# Patient Record
Sex: FEMALE | NOT HISPANIC OR LATINO | Employment: OTHER | URBAN - METROPOLITAN AREA
[De-identification: names, ages, dates, MRNs, and addresses within clinical notes are randomized per-mention and may not be internally consistent; named-entity substitution may affect disease eponyms.]

---

## 2018-11-08 ENCOUNTER — APPOINTMENT (EMERGENCY)
Dept: RADIOLOGY | Facility: HOSPITAL | Age: 83
DRG: 194 | End: 2018-11-08
Payer: MEDICARE

## 2018-11-08 ENCOUNTER — APPOINTMENT (EMERGENCY)
Dept: CT IMAGING | Facility: HOSPITAL | Age: 83
DRG: 194 | End: 2018-11-08
Payer: MEDICARE

## 2018-11-08 ENCOUNTER — HOSPITAL ENCOUNTER (INPATIENT)
Facility: HOSPITAL | Age: 83
LOS: 4 days | Discharge: NURSING, REHAB OR LONG-TERM CARE | DRG: 194 | End: 2018-11-13
Attending: EMERGENCY MEDICINE | Admitting: HOSPITALIST
Payer: MEDICARE

## 2018-11-08 DIAGNOSIS — E11.9 TYPE 2 DIABETES MELLITUS WITHOUT COMPLICATION, WITHOUT LONG-TERM CURRENT USE OF INSULIN (HCC): Chronic | ICD-10-CM

## 2018-11-08 DIAGNOSIS — J18.9 PNEUMONIA OF LEFT LOWER LOBE DUE TO INFECTIOUS ORGANISM: ICD-10-CM

## 2018-11-08 DIAGNOSIS — R07.9 CHEST PAIN: ICD-10-CM

## 2018-11-08 DIAGNOSIS — R50.9 FEVER, UNSPECIFIED FEVER CAUSE: ICD-10-CM
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
## 2018-11-08 DIAGNOSIS — R55 SYNCOPE: ICD-10-CM

## 2018-11-08 LAB
ALBUMIN SERPL BCP-MCNC: 3.1 G/DL (ref 3.5–5)
ALP SERPL-CCNC: 134 U/L (ref 46–116)
ALT SERPL W P-5'-P-CCNC: 18 U/L (ref 12–78)
ANION GAP SERPL CALCULATED.3IONS-SCNC: 5 MMOL/L (ref 4–13)
ANION GAP SERPL CALCULATED.3IONS-SCNC: 8 MMOL/L (ref 4–13)
APTT PPP: 32 SECONDS (ref 24–36)
AST SERPL W P-5'-P-CCNC: 48 U/L (ref 5–45)
BACTERIA UR QL AUTO: ABNORMAL /HPF
BASOPHILS # BLD AUTO: 0.05 THOUSANDS/ΜL (ref 0–0.1)
BASOPHILS NFR BLD AUTO: 0 % (ref 0–1)
BILIRUB SERPL-MCNC: 0.52 MG/DL (ref 0.2–1)
BILIRUB UR QL STRIP: NEGATIVE
BUN SERPL-MCNC: 19 MG/DL (ref 5–25)
BUN SERPL-MCNC: 20 MG/DL (ref 5–25)
CALCIUM SERPL-MCNC: 9.1 MG/DL (ref 8.3–10.1)
CALCIUM SERPL-MCNC: 9.5 MG/DL (ref 8.3–10.1)
CHLORIDE SERPL-SCNC: 100 MMOL/L (ref 100–108)
CHLORIDE SERPL-SCNC: 99 MMOL/L (ref 100–108)
CLARITY UR: CLEAR
CO2 SERPL-SCNC: 31 MMOL/L (ref 21–32)
CO2 SERPL-SCNC: 32 MMOL/L (ref 21–32)
COLOR UR: YELLOW
COLOR, POC: YELLOW
CREAT SERPL-MCNC: 1.21 MG/DL (ref 0.6–1.3)
CREAT SERPL-MCNC: 1.25 MG/DL (ref 0.6–1.3)
EOSINOPHIL # BLD AUTO: 0.08 THOUSAND/ΜL (ref 0–0.61)
EOSINOPHIL NFR BLD AUTO: 1 % (ref 0–6)
ERYTHROCYTE [DISTWIDTH] IN BLOOD BY AUTOMATED COUNT: 13 % (ref 11.6–15.1)
GFR SERPL CREATININE-BSD FRML MDRD: 37 ML/MIN/1.73SQ M
GFR SERPL CREATININE-BSD FRML MDRD: 38 ML/MIN/1.73SQ M
GLUCOSE SERPL-MCNC: 282 MG/DL (ref 65–140)
GLUCOSE SERPL-MCNC: 327 MG/DL (ref 65–140)
GLUCOSE UR STRIP-MCNC: ABNORMAL MG/DL
HCT VFR BLD AUTO: 40.5 % (ref 34.8–46.1)
HGB BLD-MCNC: 12.9 G/DL (ref 11.5–15.4)
HGB UR QL STRIP.AUTO: NEGATIVE
IMM GRANULOCYTES # BLD AUTO: 0.09 THOUSAND/UL (ref 0–0.2)
IMM GRANULOCYTES NFR BLD AUTO: 1 % (ref 0–2)
INR PPP: 1.02 (ref 0.86–1.17)
KETONES UR STRIP-MCNC: NEGATIVE MG/DL
LEUKOCYTE ESTERASE UR QL STRIP: ABNORMAL
LYMPHOCYTES # BLD AUTO: 1.35 THOUSANDS/ΜL (ref 0.6–4.47)
LYMPHOCYTES NFR BLD AUTO: 9 % (ref 14–44)
MCH RBC QN AUTO: 27.6 PG (ref 26.8–34.3)
MCHC RBC AUTO-ENTMCNC: 31.9 G/DL (ref 31.4–37.4)
MCV RBC AUTO: 87 FL (ref 82–98)
MONOCYTES # BLD AUTO: 0.92 THOUSAND/ΜL (ref 0.17–1.22)
MONOCYTES NFR BLD AUTO: 6 % (ref 4–12)
NEUTROPHILS # BLD AUTO: 12.64 THOUSANDS/ΜL (ref 1.85–7.62)
NEUTS SEG NFR BLD AUTO: 83 % (ref 43–75)
NITRITE UR QL STRIP: NEGATIVE
NON-SQ EPI CELLS URNS QL MICRO: ABNORMAL /HPF
NRBC BLD AUTO-RTO: 0 /100 WBCS
PH UR STRIP.AUTO: 7 [PH] (ref 4.5–8)
PLATELET # BLD AUTO: 263 THOUSANDS/UL (ref 149–390)
PMV BLD AUTO: 10.7 FL (ref 8.9–12.7)
POTASSIUM SERPL-SCNC: 4.5 MMOL/L (ref 3.5–5.3)
POTASSIUM SERPL-SCNC: 5.7 MMOL/L (ref 3.5–5.3)
PROT SERPL-MCNC: 7.9 G/DL (ref 6.4–8.2)
PROT UR STRIP-MCNC: ABNORMAL MG/DL
PROTHROMBIN TIME: 13.5 SECONDS (ref 11.8–14.2)
RBC # BLD AUTO: 4.68 MILLION/UL (ref 3.81–5.12)
RBC #/AREA URNS AUTO: ABNORMAL /HPF
SODIUM SERPL-SCNC: 136 MMOL/L (ref 136–145)
SODIUM SERPL-SCNC: 139 MMOL/L (ref 136–145)
SP GR UR STRIP.AUTO: 1.01 (ref 1–1.03)
TROPONIN I SERPL-MCNC: <0.02 NG/ML
UROBILINOGEN UR QL STRIP.AUTO: 0.2 E.U./DL
WBC # BLD AUTO: 15.13 THOUSAND/UL (ref 4.31–10.16)
WBC #/AREA URNS AUTO: ABNORMAL /HPF

## 2018-11-08 PROCEDURE — 80048 BASIC METABOLIC PNL TOTAL CA: CPT | Performed by: EMERGENCY MEDICINE

## 2018-11-08 PROCEDURE — 71046 X-RAY EXAM CHEST 2 VIEWS: CPT

## 2018-11-08 PROCEDURE — 93005 ELECTROCARDIOGRAM TRACING: CPT

## 2018-11-08 PROCEDURE — 85610 PROTHROMBIN TIME: CPT | Performed by: EMERGENCY MEDICINE

## 2018-11-08 PROCEDURE — 72125 CT NECK SPINE W/O DYE: CPT

## 2018-11-08 PROCEDURE — 80053 COMPREHEN METABOLIC PANEL: CPT | Performed by: EMERGENCY MEDICINE

## 2018-11-08 PROCEDURE — 74176 CT ABD & PELVIS W/O CONTRAST: CPT

## 2018-11-08 PROCEDURE — 84484 ASSAY OF TROPONIN QUANT: CPT | Performed by: EMERGENCY MEDICINE

## 2018-11-08 PROCEDURE — 99285 EMERGENCY DEPT VISIT HI MDM: CPT

## 2018-11-08 PROCEDURE — 36415 COLL VENOUS BLD VENIPUNCTURE: CPT | Performed by: EMERGENCY MEDICINE

## 2018-11-08 PROCEDURE — 99220 PR INITIAL OBSERVATION CARE/DAY 70 MINUTES: CPT | Performed by: HOSPITALIST

## 2018-11-08 PROCEDURE — 71250 CT THORAX DX C-: CPT

## 2018-11-08 PROCEDURE — 81001 URINALYSIS AUTO W/SCOPE: CPT

## 2018-11-08 PROCEDURE — 84443 ASSAY THYROID STIM HORMONE: CPT | Performed by: NURSE PRACTITIONER

## 2018-11-08 PROCEDURE — 85730 THROMBOPLASTIN TIME PARTIAL: CPT | Performed by: EMERGENCY MEDICINE

## 2018-11-08 PROCEDURE — 85025 COMPLETE CBC W/AUTO DIFF WBC: CPT | Performed by: EMERGENCY MEDICINE

## 2018-11-08 PROCEDURE — 70450 CT HEAD/BRAIN W/O DYE: CPT

## 2018-11-08 PROCEDURE — 84436 ASSAY OF TOTAL THYROXINE: CPT | Performed by: NURSE PRACTITIONER

## 2018-11-08 RX ORDER — LISINOPRIL 5 MG/1
5 TABLET ORAL DAILY
Status: DISCONTINUED | OUTPATIENT
Start: 2018-11-09 | End: 2018-11-13 | Stop reason: HOSPADM

## 2018-11-08 RX ORDER — SODIUM CHLORIDE 9 MG/ML
75 INJECTION, SOLUTION INTRAVENOUS CONTINUOUS
Status: DISCONTINUED | OUTPATIENT
Start: 2018-11-08 | End: 2018-11-09

## 2018-11-08 RX ORDER — PRAVASTATIN SODIUM 40 MG
40 TABLET ORAL
Status: DISCONTINUED | OUTPATIENT
Start: 2018-11-08 | End: 2018-11-13 | Stop reason: HOSPADM

## 2018-11-08 RX ORDER — PRAVASTATIN SODIUM 40 MG
40 TABLET ORAL DAILY
COMMUNITY
End: 2019-10-11

## 2018-11-08 RX ORDER — GLIMEPIRIDE 2 MG/1
2 TABLET ORAL
Status: DISCONTINUED | OUTPATIENT
Start: 2018-11-09 | End: 2018-11-09

## 2018-11-08 RX ORDER — GLIMEPIRIDE 2 MG/1
2 TABLET ORAL
COMMUNITY
End: 2018-11-13 | Stop reason: HOSPADM

## 2018-11-08 RX ORDER — CARVEDILOL 12.5 MG/1
12.5 TABLET ORAL 2 TIMES DAILY WITH MEALS
Status: DISCONTINUED | OUTPATIENT
Start: 2018-11-08 | End: 2018-11-13 | Stop reason: HOSPADM

## 2018-11-08 RX ORDER — CARVEDILOL 12.5 MG/1
12.5 TABLET ORAL 2 TIMES DAILY WITH MEALS
Status: ON HOLD | COMMUNITY
End: 2020-12-15 | Stop reason: SDUPTHER

## 2018-11-08 RX ORDER — HEPARIN SODIUM 5000 [USP'U]/ML
5000 INJECTION, SOLUTION INTRAVENOUS; SUBCUTANEOUS EVERY 8 HOURS SCHEDULED
Status: DISCONTINUED | OUTPATIENT
Start: 2018-11-08 | End: 2018-11-13 | Stop reason: HOSPADM

## 2018-11-08 RX ORDER — LISINOPRIL 10 MG/1
1 TABLET ORAL DAILY
COMMUNITY

## 2018-11-08 RX ORDER — TRAMADOL HYDROCHLORIDE 50 MG/1
50 TABLET ORAL EVERY 6 HOURS PRN
Status: DISCONTINUED | OUTPATIENT
Start: 2018-11-08 | End: 2018-11-13 | Stop reason: HOSPADM

## 2018-11-08 RX ADMIN — CARVEDILOL 12.5 MG: 12.5 TABLET, FILM COATED ORAL at 18:49

## 2018-11-08 RX ADMIN — TRAMADOL HYDROCHLORIDE 50 MG: 50 TABLET, FILM COATED ORAL at 21:14

## 2018-11-08 RX ADMIN — SODIUM CHLORIDE 75 ML/HR: 0.9 INJECTION, SOLUTION INTRAVENOUS at 18:50

## 2018-11-08 RX ADMIN — PRAVASTATIN SODIUM 40 MG: 40 TABLET ORAL at 18:49

## 2018-11-08 RX ADMIN — HEPARIN SODIUM 5000 UNITS: 5000 INJECTION INTRAVENOUS; SUBCUTANEOUS at 21:14

## 2018-11-08 NOTE — ED PROVIDER NOTES
History  Chief Complaint   Patient presents with    Fall     Patient reports this morning she was going to the bathroom, does not recall fall but reports the next memory she has is waking up laying over the side of the bath tub  Unclear from patient's story if she experienced a syncopal episode  Patient reporting chest pain which started after the fall  History provided by:  Patient  History limited by:  Age   used: No    Fall   Mechanism of injury: fall    Injury location:  Torso  Torso injury location: Central chest   Incident location:  Home  Arrived directly from scene: yes    Fall:     Fall occurred: In the bathroom    Height of fall:  Same level    Impact surface:  Unable to specify    Point of impact:  Unable to specify    Entrapped after fall: no    Protective equipment: none    Suspicion of alcohol use: no    Suspicion of drug use: no    Tetanus status:  Unknown  Prior to arrival data:     Bystander interventions:  None    Patient ambulatory at scene: yes      Blood loss:  None    Responsiveness at scene:  Alert    Orientation at scene:  Person, place, situation and time    Loss of consciousness: yes      LOC duration: unable to specify      Amnesic to event: no      Airway interventions:  None    Breathing interventions:  None    IV access status:  None    IO access:  None    Fluids administered:  None    Cardiac interventions:  None    Medications administered:  None    Immobilization:  None    Airway condition since incident:  Stable    Breathing condition since incident:  Stable    Circulation condition since incident:  Stable    Mental status condition since incident:  Stable    Disability condition since incident:  Stable  Associated symptoms: chest pain    Associated symptoms: no abdominal pain, no back pain, no headaches, no loss of consciousness, no nausea, no neck pain and no vomiting    Chest pain:     Quality: aching      Severity:  Moderate    Onset quality: Gradual    Duration:  1 hour    Timing:  Intermittent    Progression:  Waxing and waning    Chronicity:  New  Risk factors: no anticoagulation therapy        Prior to Admission Medications   Prescriptions Last Dose Informant Patient Reported? Taking?   carvedilol (COREG) 12 5 mg tablet   Yes Yes   Sig: Take 12 5 mg by mouth   glimepiride (AMARYL) 2 mg tablet   Yes Yes   Sig: Take 2 mg by mouth every morning before breakfast   lisinopril (ZESTRIL) 5 mg tablet   Yes Yes   Sig: Take 5 mg by mouth   pravastatin (PRAVACHOL) 40 mg tablet   Yes Yes   Sig: Take 40 mg by mouth daily      Facility-Administered Medications: None       Past Medical History:   Diagnosis Date    Diabetes mellitus ([facility] [address] )     Hypertension     Migraine        History reviewed  No pertinent surgical history  History reviewed  No pertinent family history  I have reviewed and agree with the history as documented  Social History   Substance Use Topics    Smoking status: Never Smoker    Smokeless tobacco: Not on file    Alcohol use No        Review of Systems   Constitutional: Negative for chills and fever  HENT: Negative for facial swelling, sore throat and trouble swallowing  Eyes: Negative for pain and visual disturbance  Respiratory: Negative for cough and shortness of breath  Cardiovascular: Positive for chest pain  Negative for leg swelling  Gastrointestinal: Negative for abdominal pain, blood in stool, diarrhea, nausea and vomiting  Genitourinary: Negative for dysuria and flank pain  Musculoskeletal: Negative for back pain, neck pain and neck stiffness  Skin: Negative for pallor and rash  Allergic/Immunologic: Negative for environmental allergies and immunocompromised state  Neurological: Negative for dizziness, loss of consciousness and headaches  Hematological: Negative for adenopathy  Does not bruise/bleed easily  Psychiatric/Behavioral: Negative for agitation and behavioral problems     All other systems reviewed and are negative  Physical Exam  Physical Exam   Constitutional: She is oriented to person, place, and time  She appears well-developed and well-nourished  No distress  HENT:   Head: Normocephalic and atraumatic  Eyes: EOM are normal    Neck: Normal range of motion  Neck supple  Cardiovascular: Normal rate, regular rhythm, normal heart sounds and intact distal pulses  Pulmonary/Chest: Effort normal and breath sounds normal    Abdominal: Soft  Bowel sounds are normal  There is no tenderness  There is no rebound and no guarding  Musculoskeletal: Normal range of motion  Neurological: She is alert and oriented to person, place, and time  Skin: Skin is warm and dry  Psychiatric: She has a normal mood and affect  Nursing note and vitals reviewed        Vital Signs  ED Triage Vitals   Temperature Pulse Respirations Blood Pressure SpO2   11/08/18 1317 11/08/18 1317 11/08/18 1317 11/08/18 1317 11/08/18 1317   98 3 °F (36 8 °C) 77 18 138/93 92 %      Temp Source Heart Rate Source Patient Position - Orthostatic VS BP Location FiO2 (%)   11/08/18 1317 11/08/18 1317 11/08/18 1317 11/08/18 1602 --   Oral Monitor Lying Left arm       Pain Score       11/08/18 1317       No Pain           Vitals:    11/08/18 1317 11/08/18 1602 11/08/18 1658 11/08/18 1744   BP: 138/93 (!) 211/94 (!) 183/85 (!) 176/84   Pulse: 77 72 73 76   Patient Position - Orthostatic VS: Lying Lying  Lying       Visual Acuity      ED Medications  Medications   carvedilol (COREG) tablet 12 5 mg (12 5 mg Oral Given 11/8/18 1849)   lisinopril (ZESTRIL) tablet 5 mg (not administered)   glimepiride (AMARYL) tablet 2 mg (not administered)   pravastatin (PRAVACHOL) tablet 40 mg (40 mg Oral Given 11/8/18 1849)   heparin (porcine) subcutaneous injection 5,000 Units (5,000 Units Subcutaneous Given 11/8/18 2114)   sodium chloride 0 9 % infusion (75 mL/hr Intravenous New Bag 11/8/18 1850)   traMADol (ULTRAM) tablet 50 mg (50 mg Oral Given 11/8/18 2114)       Diagnostic Studies  Results Reviewed     Procedure Component Value Units Date/Time    Urine Microscopic [[id number]]  (Abnormal) Collected:  11/08/18 1710    Lab Status:  Final result Specimen:  Urine from Urine, Clean Catch Updated:  11/08/18 1630     RBC, UA None Seen /hpf      WBC, UA 4-10 (A) /hpf      Epithelial Cells Occasional /hpf      Bacteria, UA Occasional /hpf     POCT urinalysis dipstick [[id number]]  (Abnormal) Resulted:  11/08/18 1559    Lab Status:  Final result Specimen:  Urine Updated:  11/08/18 1559     Color, UA yellow    ED Urine Macroscopic [[id number]]  (Abnormal) Collected:  11/08/18 1710    Lab Status:  Final result Specimen:  Urine Updated:  11/08/18 1557     Color, UA Yellow     Clarity, UA Clear     pH, UA 7 0     Leukocytes, UA Small (A)     Nitrite, UA Negative     Protein,  (2+) (A) mg/dl      Glucose, UA >=1000 (1%) (A) mg/dl      Ketones, UA Negative mg/dl      Urobilinogen, UA 0 2 E U /dl      Bilirubin, UA Negative     Blood, UA Negative     Specific Gravity, UA 1 015    Narrative:       CLINITEK RESULT    Basic metabolic panel [[id number]]  (Abnormal) Collected:  11/08/18 1527    Lab Status:  Final result Specimen:  Blood from Hand, Right Updated:  11/08/18 1548     Sodium 139 mmol/L      Potassium 4 5 mmol/L      Chloride 100 mmol/L      CO2 31 mmol/L      ANION GAP 8 mmol/L      BUN 20 mg/dL      Creatinine 1 25 mg/dL      Glucose 282 (H) mg/dL      Calcium 9 5 mg/dL      eGFR 37 ml/min/1 73sq m     Narrative:         National Kidney Disease Education Program recommendations are as follows:  GFR calculation is accurate only with a steady state creatinine  Chronic Kidney disease less than 60 ml/min/1 73 sq  meters  Kidney failure less than 15 ml/min/1 73 sq  meters      Protime-INR [[id number]]  (Normal) Collected:  11/08/18 1428    Lab Status:  Final result Specimen:  Blood from Arm, Left Updated:  11/08/18 1443     Protime 13 5 seconds      INR 1 02    APTT [[id number]]  (Normal) Collected:  11/08/18 1428    Lab Status:  Final result Specimen:  Blood from Arm, Left Updated:  11/08/18 1443     PTT 32 seconds     Comprehensive metabolic panel [[id number]]  (Abnormal) Collected:  11/08/18 1329    Lab Status:  Final result Specimen:  Blood from Arm, Left Updated:  11/08/18 1417     Sodium 136 mmol/L      Potassium 5 7 (H) mmol/L      Chloride 99 (L) mmol/L      CO2 32 mmol/L      ANION GAP 5 mmol/L      BUN 19 mg/dL      Creatinine 1 21 mg/dL      Glucose 327 (H) mg/dL      Calcium 9 1 mg/dL      AST 48 (H) U/L      ALT 18 U/L      Alkaline Phosphatase 134 (H) U/L      Total Protein 7 9 g/dL      Albumin 3 1 (L) g/dL      Total Bilirubin 0 52 mg/dL      eGFR 38 ml/min/1 73sq m     Narrative:         National Kidney Disease Education Program recommendations are as follows:  GFR calculation is accurate only with a steady state creatinine  Chronic Kidney disease less than 60 ml/min/1 73 sq  meters  Kidney failure less than 15 ml/min/1 73 sq  meters      Troponin I [[id number]]  (Normal) Collected:  11/08/18 1329    Lab Status:  Final result Specimen:  Blood from Arm, Left Updated:  11/08/18 1400     Troponin I <0 02 ng/mL     CBC and differential [[id number]]  (Abnormal) Collected:  11/08/18 1329    Lab Status:  Final result Specimen:  Blood from Arm, Left Updated:  11/08/18 1337     WBC 15 13 (H) Thousand/uL      RBC 4 68 Million/uL      Hemoglobin 12 9 g/dL      Hematocrit 40 5 %      MCV 87 fL      MCH 27 6 pg      MCHC 31 9 g/dL      RDW 13 0 %      MPV 10 7 fL      Platelets 290 Thousands/uL      nRBC 0 /100 WBCs      Neutrophils Relative 83 (H) %      Immat GRANS % 1 %      Lymphocytes Relative 9 (L) %      Monocytes Relative 6 %      Eosinophils Relative 1 %      Basophils Relative 0 %      Neutrophils Absolute 12 64 (H) Thousands/µL      Immature Grans Absolute 0 09 Thousand/uL      Lymphocytes Absolute 1 35 Thousands/µL      Monocytes Absolute 0 92 Thousand/µL Eosinophils Absolute 0 08 Thousand/µL      Basophils Absolute 0 05 Thousands/µL                  CT chest abdomen pelvis wo contrast   Final Result by Charlotte Aguilar DO (11/08 1514)   Addendum 1 of 1 by Charlotte Aguilar DO (11/08 1529)   ADDENDUM:      Typographical error within the impression  The 1st part of the impression    should read as follows  No acute TRAUMATIC injury    Final      No acute rheumatic injury of the chest, abdomen or pelvis status post fall  Gallbladder is distended with faint hyperdensity layering posteriorly suggesting sludge or tiny calcified stones  Consider right upper quadrant ultrasound and correlation with liver function testing  Diffuse osteopenia and degenerative endplate hypertrophic changes of the thoracic and lumbar spine  No acute osseous abnormality  Diffuse fecal stasis  Colonic diverticulosis  Workstation performed: [id number]         CT recon only thoracolumbar   Final Result by Ina Calles MD (11/08 1522)      Diffuse idiopathic skeletal hyperostosis of the thoracic and lumbar spine without evidence of acute trauma               Workstation performed: [id number]         XR chest 2 views   Final Result by Fermín Slade MD (11/08 1516)      No acute cardiopulmonary disease  Workstation performed: [id number]         CT cervical spine without contrast   Final Result by Charlotte Aguilar DO (11/08 1508)      Osteopenia and degenerative change of the cervical spine with no acute fracture identified  Thyroid gland is enlarged and heterogeneous  This could be further evaluated with thyroid ultrasound follow-up  Considerations related to the patient's age and/or comorbidities may be used to alter these recommendations                       Workstation performed: [id number]         CT head without contrast   Final Result by Charlotte Aguilar DO (11/08 1502)      Moderate chronic microangiopathic change with no hemorrhage or acute ischemia identified  1 cm left anterior parafalcine mass appears extra-axial on series 2 image 29 consistent with meningioma  No significant mass effect upon or edema within the brain parenchyma  Workstation performed: [id number]                    Procedures  ECG 12 Lead Documentation  Date/Time: 11/8/2018 3:23 PM  Performed by: Paolo Xavier  Authorized by: Paolo Xavier     Indications / Diagnosis:  Chest pain  ECG reviewed by me, the ED Provider: yes    Patient location:  ED  Interpretation:     Interpretation: normal    Rate:     ECG rate:  72    ECG rate assessment: normal    Rhythm:     Rhythm: sinus rhythm    Ectopy:     Ectopy: none    QRS:     QRS axis:  Normal  Conduction:     Conduction: normal    ST segments:     ST segments:  Normal  T waves:     T waves: normal             Phone Contacts  ED Phone Contact    ED Course  ED Course as of Nov 08 2123   Thu Nov 08, 2018   1548 Labs, EKG, chest x-ray, CT head, CT C-spine, chest abdomen pelvis reviewed, no acute abnormality  Results informed to the patient and daughter  Daughter concerned about dizzy, recurrent falls  We will admit for chest pain/syncope; case discussed with Dr Emanuel Walter, advised observation  MDM  Number of Diagnoses or Management Options  Chest pain: new and requires workup  Fall, initial encounter: new and requires workup  Syncope: new and requires workup  Diagnosis management comments: Is a 80-year-old female, comes with history of fall, syncope bathroom today, recovered on scene, neighbor checked on her, she had chest pain, brought through the ambulance for evaluation  On exam no acute distress:  Vital signs stable, alert and oriented, lungs clear, cardiovascular normal heart sounds, abdomen soft nontender, neuro exam no focal deficit    Differential diagnosis:  Fall, syncope, chest pain, rule out intracellular hemorrhage, electrolyte imbalance, ACS, traumatic injury to the chest abdomen pelvis  Will check labs, EKG, CT head, C-spine, CT chest, abdomen pelvis, T and L-spine  Amount and/or Complexity of Data Reviewed  Clinical lab tests: reviewed and ordered  Tests in the radiology section of CPT®: ordered and reviewed  Tests in the medicine section of CPT®: ordered  Discuss the patient with other providers: yes  Independent visualization of images, tracings, or specimens: yes      CritCare Time    Disposition  Final diagnoses:   Fall, initial encounter   Syncope   Chest pain     Time reflects when diagnosis was documented in both MDM as applicable and the Disposition within this note     Time User Action Codes Description Comment    11/8/2018  3:51 PM Nena Modest Add Faheem Sports  XXXA] Fall, initial encounter     11/8/2018  3:51 PM Nena Modest Add [R55] Syncope     11/8/2018  3:51 PM Nena Modest Add [R07 9] Chest pain       ED Disposition     ED Disposition Condition Comment    Admit  Case was discussed with Dr Payton Allen and the patient's admission status was agreed to be Admission Status: observation status to the service of Dr Payton Allen         Follow-up Information    None         Current Discharge Medication List      CONTINUE these medications which have NOT CHANGED    Details   carvedilol (COREG) 12 5 mg tablet Take 12 5 mg by mouth      glimepiride (AMARYL) 2 mg tablet Take 2 mg by mouth every morning before breakfast      lisinopril (ZESTRIL) 5 mg tablet Take 5 mg by mouth      pravastatin (PRAVACHOL) 40 mg tablet Take 40 mg by mouth daily           No discharge procedures on file      ED Provider  Electronically Signed by           Pietro Solorzano MD  11/08/18 7424

## 2018-11-08 NOTE — H&P
H&P- Joe Rollins 5/2/1923, 80 y o  female MRN: [id number]    Unit/Bed#: [facility]ENN[facility] Encounter: [id number]    Primary Care Provider: Juju Bella DO   Date and time admitted to hospital: 11/8/2018 12:52 PM        * Fall   Assessment & Plan    Multiple falls at home  Some chest wall trauma  Daughter wants rehab             Chief Complaint:   Fall in bathroom  Fell on side of bathtub  History of Present Illness:    Joe Rollins is a 80 y o  female who presents with fall  She was using the toilet and became very weak  She fell off the toilet and hit her chest on the side of the bathtub  No actual loss of consiousness  She then got up and got herself over to use the phone and called her neighbor for help  No actual LOC  She has fallen multiple times at home         Review of Systems:    Review of Systems   Constitutional: Positive for fatigue  HENT: Negative  Eyes: Negative  Respiratory: Negative  Cardiovascular: Positive for chest pain  Gastrointestinal: Negative  Endocrine: Negative  Genitourinary: Negative  Musculoskeletal: Negative  Neurological: Positive for weakness  All other systems reviewed and are negative  Past Medical and Surgical History:     Past Medical History:   Diagnosis Date    Diabetes mellitus ([facility] [address] )     Hypertension     Migraine        History reviewed  No pertinent surgical history  Home Medications:    Prior to Admission medications    Medication Sig Start Date End Date Taking?  Authorizing Provider   carvedilol (COREG) 12 5 mg tablet Take 12 5 mg by mouth   Yes Historical Provider, MD   glimepiride (AMARYL) 2 mg tablet Take 2 mg by mouth every morning before breakfast   Yes Historical Provider, MD   lisinopril (ZESTRIL) 5 mg tablet Take 5 mg by mouth   Yes Historical Provider, MD   pravastatin (PRAVACHOL) 40 mg tablet Take 40 mg by mouth daily   Yes Historical Provider, MD     I have reviewed home medications with patient personally  Allergies: No Known Allergies      Social History:    Substance Use History:   History   Alcohol Use No     History   Smoking Status    Never Smoker   Smokeless Tobacco    Not on file     History   Drug Use No         Family History:    non-contributory      Physical Exam:     Vitals:   Blood Pressure: (!) 183/85 (11/08/18 1658)  Pulse: 73 (11/08/18 1658)  Temperature: 98 3 °F (36 8 °C) (11/08/18 1317)  Temp Source: Oral (11/08/18 1317)  Respirations: 18 (11/08/18 1658)  Weight - Scale: 67 2 kg (148 lb 2 4 oz) (11/08/18 1317)  SpO2: 98 % (11/08/18 1658)    Physical Exam   HENT:   Head: Normocephalic and atraumatic  Eyes: Pupils are equal, round, and reactive to light  EOM are normal    Cardiovascular: Normal rate and regular rhythm  Exam reveals no gallop and no friction rub  No murmur heard  Pulmonary/Chest: Effort normal and breath sounds normal  She has no wheezes  She has no rales  Abdominal: Soft  There is no tenderness  Musculoskeletal: She exhibits no edema  Chest pain to palpation of anterior chest well  No eccymosis  No evidence of rib fractures,  Nursing note and vitals reviewed       Additional Data:     Lab Results: I have personally reviewed pertinent reports  Results from last 7 days  Lab Units 11/08/18  1329   WBC Thousand/uL 15 13*   HEMOGLOBIN g/dL 12 9   HEMATOCRIT % 40 5   PLATELETS Thousands/uL 263   NEUTROS PCT % 83*   LYMPHS PCT % 9*   MONOS PCT % 6   EOS PCT % 1       Results from last 7 days  Lab Units 11/08/18  1527 11/08/18  1329   POTASSIUM mmol/L 4 5 5 7*   CHLORIDE mmol/L 100 99*   CO2 mmol/L 31 32   BUN mg/dL 20 19   CREATININE mg/dL 1 25 1 21   CALCIUM mg/dL 9 5 9 1   ALK PHOS U/L  --  134*   ALT U/L  --  18   AST U/L  --  48*       Results from last 7 days  Lab Units 11/08/18  1428   INR  1 02                 Imaging: I have personally reviewed pertinent reports        CT chest abdomen pelvis wo contrast   Final Result by Twan Cleaning,  (11/08 1514)   Addendum 1 of 1 by Kashmir Samuels DO (11/08 1529)   ADDENDUM:      Typographical error within the impression  The 1st part of the impression    should read as follows  No acute TRAUMATIC injury    Final      No acute rheumatic injury of the chest, abdomen or pelvis status post fall  Gallbladder is distended with faint hyperdensity layering posteriorly suggesting sludge or tiny calcified stones  Consider right upper quadrant ultrasound and correlation with liver function testing  Diffuse osteopenia and degenerative endplate hypertrophic changes of the thoracic and lumbar spine  No acute osseous abnormality  Diffuse fecal stasis  Colonic diverticulosis  Workstation performed: [id number]         CT recon only thoracolumbar   Final Result by Tavia Hatch MD (11/08 1522)      Diffuse idiopathic skeletal hyperostosis of the thoracic and lumbar spine without evidence of acute trauma               Workstation performed: [id number]         XR chest 2 views   Final Result by Dominique Rondon MD (11/08 1516)      No acute cardiopulmonary disease  Workstation performed: [id number]         CT cervical spine without contrast   Final Result by Kashmir Samuels DO (11/08 1508)      Osteopenia and degenerative change of the cervical spine with no acute fracture identified  Thyroid gland is enlarged and heterogeneous  This could be further evaluated with thyroid ultrasound follow-up  Considerations related to the patient's age and/or comorbidities may be used to alter these recommendations  Workstation performed: [id number]         CT head without contrast   Final Result by Kashmir Samuels DO (11/08 1502)      Moderate chronic microangiopathic change with no hemorrhage or acute ischemia identified  1 cm left anterior parafalcine mass appears extra-axial on series 2 image 29 consistent with meningioma    No significant mass effect upon or edema within the brain parenchyma  Workstation performed: [id number]                 VTE Prophylaxis: Heparin        Anticipated Length of Stay:  Patient will be admitted on an Observation basis with an anticipated length of stay of  Less than 2 midnights  Justification for Hospital Stay: evaluation for falls      Total Time for Visit, including Counseling / Coordination of Care: 45 minutes  Greater than 50% of this total time spent on direct patient counseling and coordination of care  ** Please Note: This note has been constructed using a voice recognition system   **

## 2018-11-09 ENCOUNTER — APPOINTMENT (INPATIENT)
Dept: NON INVASIVE DIAGNOSTICS | Facility: HOSPITAL | Age: 83
DRG: 194 | End: 2018-11-09
Payer: MEDICARE

## 2018-11-09 PROBLEM — E11.9 TYPE 2 DIABETES MELLITUS WITHOUT COMPLICATION, WITHOUT LONG-TERM CURRENT USE OF INSULIN (HCC): Chronic | Status: ACTIVE | Noted: 2018-11-09

## 2018-11-09 PROBLEM — R55 SYNCOPE AND COLLAPSE: Status: ACTIVE | Noted: 2018-11-09

## 2018-11-09 PROBLEM — I10 ESSENTIAL HYPERTENSION: Chronic | Status: ACTIVE | Noted: 2018-11-09

## 2018-11-09 LAB
ANION GAP SERPL CALCULATED.3IONS-SCNC: 6 MMOL/L (ref 4–13)
BASOPHILS # BLD AUTO: 0.04 THOUSANDS/ΜL (ref 0–0.1)
BASOPHILS NFR BLD AUTO: 1 % (ref 0–1)
BUN SERPL-MCNC: 17 MG/DL (ref 5–25)
CALCIUM SERPL-MCNC: 8.8 MG/DL (ref 8.3–10.1)
CHLORIDE SERPL-SCNC: 103 MMOL/L (ref 100–108)
CO2 SERPL-SCNC: 30 MMOL/L (ref 21–32)
CREAT SERPL-MCNC: 1.21 MG/DL (ref 0.6–1.3)
EOSINOPHIL # BLD AUTO: 0.11 THOUSAND/ΜL (ref 0–0.61)
EOSINOPHIL NFR BLD AUTO: 1 % (ref 0–6)
ERYTHROCYTE [DISTWIDTH] IN BLOOD BY AUTOMATED COUNT: 13.1 % (ref 11.6–15.1)
GFR SERPL CREATININE-BSD FRML MDRD: 38 ML/MIN/1.73SQ M
GLUCOSE SERPL-MCNC: 178 MG/DL (ref 65–140)
GLUCOSE SERPL-MCNC: 197 MG/DL (ref 65–140)
GLUCOSE SERPL-MCNC: 249 MG/DL (ref 65–140)
HCT VFR BLD AUTO: 40.2 % (ref 34.8–46.1)
HGB BLD-MCNC: 12.6 G/DL (ref 11.5–15.4)
IMM GRANULOCYTES # BLD AUTO: 0.01 THOUSAND/UL (ref 0–0.2)
IMM GRANULOCYTES NFR BLD AUTO: 0 % (ref 0–2)
LYMPHOCYTES # BLD AUTO: 1.97 THOUSANDS/ΜL (ref 0.6–4.47)
LYMPHOCYTES NFR BLD AUTO: 23 % (ref 14–44)
MAGNESIUM SERPL-MCNC: 2.1 MG/DL (ref 1.6–2.6)
MCH RBC QN AUTO: 27.1 PG (ref 26.8–34.3)
MCHC RBC AUTO-ENTMCNC: 31.3 G/DL (ref 31.4–37.4)
MCV RBC AUTO: 87 FL (ref 82–98)
MONOCYTES # BLD AUTO: 0.96 THOUSAND/ΜL (ref 0.17–1.22)
MONOCYTES NFR BLD AUTO: 11 % (ref 4–12)
NEUTROPHILS # BLD AUTO: 5.51 THOUSANDS/ΜL (ref 1.85–7.62)
NEUTS SEG NFR BLD AUTO: 64 % (ref 43–75)
NRBC BLD AUTO-RTO: 0 /100 WBCS
PLATELET # BLD AUTO: 319 THOUSANDS/UL (ref 149–390)
PMV BLD AUTO: 9.2 FL (ref 8.9–12.7)
POTASSIUM SERPL-SCNC: 3.9 MMOL/L (ref 3.5–5.3)
RBC # BLD AUTO: 4.65 MILLION/UL (ref 3.81–5.12)
SODIUM SERPL-SCNC: 139 MMOL/L (ref 136–145)
T4 SERPL-MCNC: 8.2 UG/DL (ref 4.7–13.3)
TSH SERPL DL<=0.05 MIU/L-ACNC: 0.74 UIU/ML (ref 0.36–3.74)
WBC # BLD AUTO: 8.6 THOUSAND/UL (ref 4.31–10.16)

## 2018-11-09 PROCEDURE — 80048 BASIC METABOLIC PNL TOTAL CA: CPT | Performed by: HOSPITALIST

## 2018-11-09 PROCEDURE — 97166 OT EVAL MOD COMPLEX 45 MIN: CPT

## 2018-11-09 PROCEDURE — G8979 MOBILITY GOAL STATUS: HCPCS

## 2018-11-09 PROCEDURE — G8987 SELF CARE CURRENT STATUS: HCPCS

## 2018-11-09 PROCEDURE — 82948 REAGENT STRIP/BLOOD GLUCOSE: CPT

## 2018-11-09 PROCEDURE — 93306 TTE W/DOPPLER COMPLETE: CPT

## 2018-11-09 PROCEDURE — G8978 MOBILITY CURRENT STATUS: HCPCS

## 2018-11-09 PROCEDURE — 99232 SBSQ HOSP IP/OBS MODERATE 35: CPT | Performed by: HOSPITALIST

## 2018-11-09 PROCEDURE — 93306 TTE W/DOPPLER COMPLETE: CPT | Performed by: INTERNAL MEDICINE

## 2018-11-09 PROCEDURE — 93880 EXTRACRANIAL BILAT STUDY: CPT

## 2018-11-09 PROCEDURE — G8988 SELF CARE GOAL STATUS: HCPCS

## 2018-11-09 PROCEDURE — 85025 COMPLETE CBC W/AUTO DIFF WBC: CPT | Performed by: HOSPITALIST

## 2018-11-09 PROCEDURE — 83735 ASSAY OF MAGNESIUM: CPT | Performed by: HOSPITALIST

## 2018-11-09 PROCEDURE — 97163 PT EVAL HIGH COMPLEX 45 MIN: CPT

## 2018-11-09 RX ADMIN — HEPARIN SODIUM 5000 UNITS: 5000 INJECTION INTRAVENOUS; SUBCUTANEOUS at 14:18

## 2018-11-09 RX ADMIN — CARVEDILOL 12.5 MG: 12.5 TABLET, FILM COATED ORAL at 08:39

## 2018-11-09 RX ADMIN — HEPARIN SODIUM 5000 UNITS: 5000 INJECTION INTRAVENOUS; SUBCUTANEOUS at 21:13

## 2018-11-09 RX ADMIN — GLIMEPIRIDE 2 MG: 2 TABLET ORAL at 08:39

## 2018-11-09 RX ADMIN — CARVEDILOL 12.5 MG: 12.5 TABLET, FILM COATED ORAL at 18:06

## 2018-11-09 RX ADMIN — PRAVASTATIN SODIUM 40 MG: 40 TABLET ORAL at 18:06

## 2018-11-09 RX ADMIN — TRAMADOL HYDROCHLORIDE 50 MG: 50 TABLET, FILM COATED ORAL at 21:12

## 2018-11-09 RX ADMIN — SODIUM CHLORIDE 75 ML/HR: 0.9 INJECTION, SOLUTION INTRAVENOUS at 08:17

## 2018-11-09 RX ADMIN — LISINOPRIL 5 MG: 5 TABLET ORAL at 08:39

## 2018-11-09 RX ADMIN — INSULIN LISPRO 1 UNITS: 100 INJECTION, SOLUTION INTRAVENOUS; SUBCUTANEOUS at 18:07

## 2018-11-09 RX ADMIN — INSULIN LISPRO 2 UNITS: 100 INJECTION, SOLUTION INTRAVENOUS; SUBCUTANEOUS at 21:13

## 2018-11-09 RX ADMIN — HEPARIN SODIUM 5000 UNITS: 5000 INJECTION INTRAVENOUS; SUBCUTANEOUS at 05:21

## 2018-11-09 NOTE — ASSESSMENT & PLAN NOTE
· Patient reports that she was on the toilet and felt herself falling forward  Her next recollection was leaning over the bathtub  She had LOC  She did not have preceding symptoms of dizziness or chest pain  · She states she has had dizziness accompanied by nausea and right ear discomfort frequently but not syncope  · This occurred first thing in the morning before eating, drinking or taking any medication    · Brain CT was negative for acute abnormality  · Will proceed with ECHO and carotid ultrasound, neuro checks, orthostatic vitals

## 2018-11-09 NOTE — SOCIAL WORK
CM met with patient and daughter, Dallin Donohue at bedside to discuss d/c plan and do initial assessment  Consult received for rehab placement, introduced self and explained role  Patient in Observation Status, Notice given, signed and sent to Medical Records  Patient admitted for falls at home, PT/OT recommend STR at d/c  Explained difference between Acute and Sub-acute rehabs, daughter would like a rehab near her in [place]  Patient and daughter agreeable to [facility], [facility] and [address], all referrals sent  SNF arline provided to daughter for [facility]  Patient lives alone in a one level apartment in [place] independent with adls, ambulates with a cane while in community but uses furniture in apartment  She does not drive anymore  Patient's daughter provides transportation and shopping, sometimes her friend/neighbor helps with driving  Hx of STR at [facility] in past and home care but could not remember name of agency  Rx coverage with [facility] and uses [facility] in [place]  PCP is Jamee Hogue  POA is daughter, Dallin Donohue, Living Will done and at home  CM will f/u with rehab bed availability       Joaquín Escalante RN   11/09/18  12:50 PM

## 2018-11-09 NOTE — PLAN OF CARE
CARDIOVASCULAR - ADULT     Maintains optimal cardiac output and hemodynamic stability Progressing     Absence of cardiac dysrhythmias or at baseline rhythm Progressing        DISCHARGE PLANNING     Discharge to home or other facility with appropriate resources Progressing        DISCHARGE PLANNING - CARE MANAGEMENT     Discharge to post-acute care or home with appropriate resources Progressing        HEMATOLOGIC - ADULT     Maintains hematologic stability Progressing        INFECTION - ADULT     Absence or prevention of progression during hospitalization Progressing        Knowledge Deficit     Patient/family/caregiver demonstrates understanding of disease process, treatment plan, medications, and discharge instructions Progressing        METABOLIC, FLUID AND ELECTROLYTES - ADULT     Electrolytes maintained within normal limits Progressing        MUSCULOSKELETAL - ADULT     Maintain or return mobility to safest level of function Progressing        PAIN - ADULT     Verbalizes/displays adequate comfort level or baseline comfort level Progressing        Potential for Falls     Patient will remain free of falls Progressing        Prexisting or High Potential for Compromised Skin Integrity     Skin integrity is maintained or improved Progressing        RESPIRATORY - ADULT     Achieves optimal ventilation and oxygenation Progressing        SAFETY ADULT     Patient will remain free of falls Progressing

## 2018-11-09 NOTE — PROGRESS NOTES
[facility]  Progress Note - Zaida Mayo 5/2/1923, 80 y o  female MRN: [id number]    Unit/Bed#: E4 -01 Encounter: [id number]    Primary Care Provider: Bakari Ross DO   Date and time admitted to hospital: 11/8/2018 12:52 PM        * Syncope and collapse   Assessment & Plan    · Patient reports that she was on the toilet and felt herself falling forward  Her next recollection was leaning over the bathtub  She had LOC  She did not have preceding symptoms of dizziness or chest pain  · She states she has had dizziness accompanied by nausea and right ear discomfort frequently but not syncope  · This occurred first thing in the morning before eating, drinking or taking any medication  · Brain CT was negative for acute abnormality  · Will proceed with ECHO and carotid ultrasound, neuro checks, orthostatic vitals     Fall   Assessment & Plan    · Multiple falls at home due to dizziness  · Denies current pain  · Daughter wants rehab, patient would like to go home  · PT / OT consults pending     Essential hypertension   Assessment & Plan    · On Coreg and Lisinopril per home regimen  · Orthostatic VS ordered     Type 2 diabetes mellitus without complication, without long-term current use of insulin ([facility] )   Assessment & Plan    No results found for: HGBA1C    No results for input(s): POCGLU in the last 72 hours  Blood Sugar Average: Last 72 hrs:    ·  On home regimen of glimepiride 2 mg daily  · A1c pending  · Would consider changing glimepiride on discharge as patient reports she has not been eating much and has lost 40# in the last two years  · FSBS ac / hs with SSI coverage         VTE Pharmacologic Prophylaxis:   Pharmacologic: Heparin  Mechanical VTE Prophylaxis in Place: No    Patient Centered Rounds: I have performed bedside rounds with nursing staff today      Discussions with Specialists or Other Care Team Provider: Attending    Education and Discussions with Family / Patient: plan of care with patient    Time Spent for Care: 30 minutes  More than 50% of total time spent on counseling and coordination of care as described above  Current Length of Stay: 0 day(s)    Current Patient Status: Observation   Certification Statement: The patient will continue to require additional inpatient hospital stay due to evaluation for syncope    Discharge Plan: Pending diagnostic work-up    Code Status: Level 1 - Full Code      Subjective:   Patient feels improved today  She denies current pain or dizziness  She states most mornings, she has dizziness and nausea and is unable to eat  She has had weight loss because of this  She did not have dizziness yesterday prior to her syncope  Objective:     Vitals:   Temp (24hrs), Av 7 °F (37 1 °C), Min:97 8 °F (36 6 °C), Max:99 6 °F (37 6 °C)    Temp:  [97 8 °F (36 6 °C)-99 6 °F (37 6 °C)] 98 7 °F (37 1 °C)  HR:  [72-86] 86  Resp:  [16-20] 16  BP: (141-211)/(74-94) 168/80  SpO2:  [92 %-98 %] 96 %  Body mass index is 26 24 kg/m²  Input and Output Summary (last 24 hours): Intake/Output Summary (Last 24 hours) at 18 1323  Last data filed at 18 1202   Gross per 24 hour   Intake              300 ml   Output                0 ml   Net              300 ml       Physical Exam:     Physical Exam   Constitutional: She is oriented to person, place, and time  She appears well-developed and well-nourished  No distress  HENT:   Head: Normocephalic and atraumatic  Eyes: Conjunctivae are normal  No scleral icterus  Cardiovascular: Normal rate, regular rhythm and intact distal pulses  Murmur heard  Pulses:       Carotid pulses are on the left side with bruit  Pulmonary/Chest: Effort normal and breath sounds normal  No respiratory distress  She has no wheezes  She has no rales  Abdominal: Soft  Bowel sounds are normal  She exhibits no distension  There is no tenderness  Musculoskeletal: Normal range of motion   She exhibits no edema or tenderness  Neurological: She is alert and oriented to person, place, and time  Skin: Skin is warm and dry  She is not diaphoretic  Psychiatric: She has a normal mood and affect  Her behavior is normal        Additional Data:     Labs:      Results from last 7 days  Lab Units 11/09/18  0419   WBC Thousand/uL 8 60   HEMOGLOBIN g/dL 12 6   HEMATOCRIT % 40 2   PLATELETS Thousands/uL 319   NEUTROS PCT % 64   LYMPHS PCT % 23   MONOS PCT % 11   EOS PCT % 1       Results from last 7 days  Lab Units 11/09/18  0437  11/08/18  1329   POTASSIUM mmol/L 3 9  < > 5 7*   CHLORIDE mmol/L 103  < > 99*   CO2 mmol/L 30  < > 32   BUN mg/dL 17  < > 19   CREATININE mg/dL 1 21  < > 1 21   ANION GAP mmol/L 6  < > 5   CALCIUM mg/dL 8 8  < > 9 1   ALBUMIN g/dL  --   --  3 1*   TOTAL BILIRUBIN mg/dL  --   --  0 52   ALK PHOS U/L  --   --  134*   ALT U/L  --   --  18   AST U/L  --   --  48*   < > = values in this interval not displayed  Results from last 7 days  Lab Units 11/08/18  1428   INR  1 02                       * I Have Reviewed All Lab Data Listed Above  * Additional Pertinent Lab Tests Reviewed:  [facility] Admission Reviewed    Imaging:    Imaging Reports Reviewed Today Include: CXR, CT of head, c-spine, thoracolumbar, chest, abdomen and pelvis  Imaging Personally Reviewed by Myself Includes:  None    Recent Cultures (last 7 days):           Last 24 Hours Medication List:     Current Facility-Administered Medications:  carvedilol 12 5 mg Oral BID With Meals Titi Prechtel, DO    heparin (porcine) 5,000 Units Subcutaneous Q8H [address] Titi Prechtel, DO    insulin lispro 1-5 Units Subcutaneous TID AC SAMIRA Vasquez    insulin lispro 1-5 Units Subcutaneous HS SAMIRA Vasquez    lisinopril 5 mg Oral Daily Titi Prechtel, DO    pravastatin 40 mg Oral Daily With [employer] Prechtel, DO    sodium chloride 75 mL/hr Intravenous Continuous Titi Prechtel, DO Last Rate: 75 mL/hr (11/09/18 4272)   traMADol 50 mg Oral Q6H PRN [employer] MIRSHAHBAZ, DO         Today, Patient Was Seen By: SAMIRA Davenport    ** Please Note: Dictation voice to text software may have been used in the creation of this document   **

## 2018-11-09 NOTE — UTILIZATION REVIEW
OBSERVATION WRITTEN 11/08/18 @ 1554 CONVERTED TO INPATIENT ADMISSION 11/09/18 @ 1330 DUE TO FURTHER DIAGNOSTIC WORKUP REQUIRED FOR SYNCOPE, REQUIRING AT LEAST A 2 MIDNIGHT STAY  Admitting Physician BREE SUN    Level of Care Med Surg    Estimated length of stay More than 2 Midnights    Certification I certify that inpatient services are medically necessary for this patient for a duration of greater than two midnights  See H&P and MD Progress Notes for additional information about the patient's course of treatment  [facility] Review Department  Phone: [phone]; Fax [phone]  [email or web address]  ATTENTION: Please call with any questions or concerns to [phone]  and carefully listen to the prompts so that you are directed to the right person  Send all requests for admission clinical reviews, approved or denied determinations and any other requests to fax [phone]   All voicemails are confidential

## 2018-11-09 NOTE — SOCIAL WORK
Follow up on referrals sent, Jaki Issa states patient not accepted due to Medical Doctor recommending subacute  No response yet from [facility], Denied by [facility]  CM updated daughter Lindsey Ross over the phone, Lindsey Ross requested [facility], referral sent  CM educated daughter, Lindsey Ross on [address] as an option and she was agreeable to [facility] as a back up if patient denied by [facility]  CM will continue to follow       Debbie Aguero RN  11/09/18  2:39 PM

## 2018-11-09 NOTE — ASSESSMENT & PLAN NOTE
· Multiple falls at home due to dizziness  · Denies current pain  · Daughter wants rehab, patient would like to go home  · PT / OT consults pending

## 2018-11-09 NOTE — OCCUPATIONAL THERAPY NOTE
OccupationalTherapy Evaluation     Patient Name: Adarsh ALONSOQIRogerK Date: 11/9/2018  Problem List  Patient Active Problem List   Diagnosis    Fall     Past Medical History  Past Medical History:   Diagnosis Date    Diabetes mellitus ([address] )     Hypertension     Migraine      Past Surgical History  History reviewed  No pertinent surgical history  11/09/18 1201   Note Type   Note type Eval only   Restrictions/Precautions   Weight Bearing Precautions Per Order No   Other Precautions Cognitive; Chair Alarm; Bed Alarm; Fall Risk;Multiple lines;Telemetry; Visual impairment  (Blind in L eye)   Pain Assessment   Pain Assessment No/denies pain   Pain Score No Pain   Home Living   Type of Home Apartment   Home Layout One level; Other (Comment)  (0 JENSEN)   Bathroom Shower/Tub Tub/shower unit  (reports 1 shower/wk, sponge bathes other days)   Bathroom Toilet Standard   Bathroom Equipment Grab bars in shower   [address] [place]ane   Additional Comments Pt lives alone in a one level apt with 0 JENSEN  Pt reports dtr and niece reports able to assist as needed  Prior Function   Level of [place] Independent with ADLs and functional mobility   Lives With Alone   Receives Help From Family   ADL Assistance Independent   IADLs Independent   Falls in the last 6 months 1 to 4   Vocational Retired   Comments At baseline, pt was I w/ ADLs, IADLs (reports gets on ladders, moves furniture), and functional mobility/transfers with use of SPC within community, (-) , and reports 3 falls PTA  Lifestyle   Autonomy At baseline, pt was I w/ ADLs, IADLs (reports gets on ladders, moves furniture), and functional mobility/transfers with use of SPC within community, (-) , and reports 3 falls PTA     Reciprocal Relationships Lives alone; Supportive dtr and niece   Service to Others Retired   Intrinsic Gratification Watching sports- gymnastics    Psychosocial   Psychosocial ([address]) WDL ADL   Eating Assistance 6  Modified independent   Grooming Assistance 5  Supervision/Setup   UB Bathing Assistance 5  Supervision/Setup   LB Bathing Assistance 4  Minimal Assistance   UB Dressing Assistance 5  Supervision/Setup   LB Dressing Assistance 4  [address] 4  Minimal Assistance   Bed Mobility   Supine to Sit 5  Supervision   Additional items HOB elevated; Bedrails; Increased time required   Sit to Supine Unable to assess  (Pt seated OOB in chair at end of session)   Additional Comments Pt seated OOB in chair at end of session with chair alarm activated  Call bell and phone within reach  All needs met and pt reports no further questions for OT at this time   Transfers   Sit to Stand 4  Minimal assistance   Additional items Assist x 1; Increased time required;Verbal cues   Stand to Sit 4  Minimal assistance   Additional items Assist x 1; Armrests; Increased time required;Verbal cues   Additional Comments Cues for safe technique and hand placement   Functional Mobility   Functional Mobility 4  Minimal assistance   Additional Comments Assist x1   Additional items Rolling walker   Balance   Static Sitting Fair +   Dynamic Sitting Fair   Static Standing Fair -   Dynamic Standing Poor +   Ambulatory Poor +   Activity Tolerance   Activity Tolerance Patient tolerated treatment well   Medical Staff Made Glenn Sotelo RN   Nurse Made Aware Pt appropriate to be seen per Ami Garcia RN   RUE Assessment   RUE Assessment [facility]   RUE Strength   RUE Overall Strength Within Functional Limits - able to perform ADL tasks with strength  (3+/5 throughout)   LUE Assessment   LUE Assessment WFL   LUE Strength   LUE Overall Strength Within Functional Limits - able to perform ADL tasks with strength  (3+/5 throughout)   Hand Function   Gross Motor Coordination Functional   Fine Motor Coordination Functional   Sensation   Light Touch No apparent deficits Sharp/Dull No apparent deficits   Proprioception   Proprioception No apparent deficits   Vision-Basic Assessment   Current Vision Wears glasses all the time   Visual History Other (Comment)  (Blind in L eye)   Vision - Complex Assessment   Ocular Range of Motion [facility]   Acuity Able to read clock/calendar on wall without difficulty   Additional Comments Blind in L eye; Able to read clock accurately on wall   Perception   Inattention/Neglect Appears intact   Cognition   Overall Cognitive Status Impaired   Arousal/Participation Alert; Cooperative   Attention Attends with cues to redirect   Orientation Level Oriented to person;Oriented to place;Oriented to situation  (month and year only)   Memory Decreased recall of recent events;Decreased short term memory   Following Commands Follows one step commands with increased time or repetition   Comments Pt pleasant and cooperative   Assessment   Limitation Decreased ADL status; Decreased Safe judgement during ADL;Decreased UE strength;Decreased endurance;Decreased self-care trans;Decreased high-level ADLs   Prognosis Good   Assessment Pt is a 80 y o  female seen for OT evaluation s/p adm to [facility] on 11/8/2018 s/p fall on toilet, hitting chest on the side of the bathtub  Questionable syncopal episode  Imaging (-) for acute fracture of injury  Comorbidities affecting pts functional performance include a significant PMH of DM, HTN, and Migraine  Pt with active OT orders and activity orders for Up with assistance  Pt lives alone in a one level apt with 0 JENSEN  Pt reports dtr and niece reports able to assist as needed  At baseline, pt was I w/ ADLs, IADLs (reports gets on ladders, moves furniture), and functional mobility/transfers with use of SPC within community, (-) , and reports 3 falls PTA   Upon evaluation, pt currently requires Min A-Supervision for overall ADLs and Min A for functional mobility/transfers 2* the following deficits impacting occupational performance: weakness, decreased strength, decreased balance, decreased tolerance, impaired memory and decreased safety awareness  These impairments, as well at pts limited home support, difficulty performing ADLS, difficulty performing IADLS  and limited insight into deficits limit pts ability to safely engage in all baseline areas of occupation, including grooming, bathing, dressing, toileting, functional mobility/transfers, community mobility, laundry , house maintenance, meal prep, cleaning, social participation  and leisure activities   Pt scored overall 65/100 on the Barthel Index  Based on the aforementioned OT evaluation, functional performance deficits, and assessments, pt has been identified as a moderate complexity evaluation  Pt to continue to benefit from continued acute OT services during hospital stay to address defined deficits and to maximize level of functional independence in the following Occupational Performance areas: grooming, bathing/shower, toilet hygiene, dressing, medication management, socialization, health maintenance, functional mobility, community mobility, clothing management, cleaning, meal prep, household maintenance and social participation  From OT standpoint, recommend STR upon D/C  OT will continue to follow pt 3-5x/wk to address the following goals to  w/in 10-14 days:   Goals   Patient Goals To go home   LTG Time Frame 10-14   Long Term Goal Please refer to LTGs listed below   Plan   Treatment Interventions ADL retraining;Functional transfer training; Endurance training;UE strengthening/ROM; Patient/family training;Equipment evaluation/education; Compensatory technique education; Energy conservation; Activityengagement   Goal Expiration Date 18   Treatment Day 0   OT Frequency 3-5x/wk   Recommendation   OT Discharge Recommendation Short Term Rehab   OT - OK to Discharge Yes  (when medically cleared to rehab)   Barthel Index   Feeding 10   Bathing 0   Grooming Score 5 Dressing Score 5   Bladder Score 10   Bowels Score 10   Toilet Use Score 5   Transfers (Bed/Chair) Score 10   Mobility (Level Surface) Score 10   Stairs Score 0   Barthel Index Score 65   Modified Paterson Scale   Modified Paterson Scale 4        GOALS    1) Pt will improve activity tolerance to G for min 30 min txment sessions    2) Pt will complete UB/LB dressing/self care w/ mod I using adaptive device and DME as needed    3) Pt will complete bathing w/ Mod I w/ use of AE and DME as needed    4) Pt will complete toileting w/ mod I w/ G hygiene/thoroughness using DME as needed    5) Pt will improve functional transfers to Mod I on/off all surfaces using DME as needed w/ G balance/safety     6) Pt will improve functional mobility during ADL/IADL/leisure tasks to Mod I using DME as needed w/ G balance/safety     7) Pt will participate in simulated IADL management task to increase independence to Mod I w/ G safety and endurance    8) Pt will engage in ongoing cognitive assessment w/ G participation w/ mod I to assist w/ safe d/c planning/recommendations    9) Pt will demonstrate G carryover of pt/caregiver education and training as appropriate w/ mod I w/o cues w/ good tolerance    10) Pt will demonstrate 100% carryover of energy conservation techniques w/ mod I t/o functional I/ADL/leisure tasks w/o cues s/p skilled education     11) Pt will increase BUE strength by 1 MM grade to increase independence in ADLs and transfers    12) Pt will be able to state 3 potential fall hazards and appropriate compensatory techniques to decrease fall risk in home environment       Abhay Gonsalez OTR/L

## 2018-11-09 NOTE — ASSESSMENT & PLAN NOTE
No results found for: HGBA1C    No results for input(s): POCGLU in the last 72 hours  Blood Sugar Average: Last 72 hrs:    ·  On home regimen of glimepiride 2 mg daily  · A1c pending  · Would consider changing glimepiride on discharge as patient reports she has not been eating much and has lost 40# in the last two years    · FSBS ac / hs with SSI coverage

## 2018-11-09 NOTE — PHYSICAL THERAPY NOTE
PT EVALUATION    89 y o     [id number]    Syncope [R55]  Chest pain [R07 9]    Past Medical History:   Diagnosis Date    Diabetes mellitus ([facility] [address] )     Hypertension     Migraine          History reviewed  No pertinent surgical history  11/09/18 1202   Note Type   Note type Eval only   Pain Assessment   Pain Assessment No/denies pain   Home Living   Type of [address] One level  (denies JENSEN)   Bathroom Shower/Tub Tub/shower unit   Bathroom Toilet Standard   Bathroom Equipment Grab bars in shower   [address] Walker;Cane  (used cane in community, in home "furniture walks")   Additional Comments Resides alone, does not drive  Local niece and dtr who are supportive  Prior Function   Level of [place] Independent with ADLs and functional mobility   Lives With Alone   Receives Help From Family   ADL Assistance Independent   IADLs Independent   Falls in the last 6 months 1 to 4  (initially states 24, but then 3 actual falls, rest LOB's)   Vocational Retired   Comments I PTA  Reports still moves furniture to clean, gets up on ladders to do house chores  Continues to do all housework  Restrictions/Precautions   Weight Bearing Precautions Per Order No   Other Precautions Cognitive; Fall Risk;Multiple lines;Telemetry; Visual impairment  (blind L eye)   General   Additional Pertinent History Pt is 81 y/o female admitted with fall  ? syncopal episode  PT consulted  Up with assist    Family/Caregiver Present Yes  (dtr at end of session )   Cognition   Overall Cognitive Status Impaired   Arousal/Participation Alert   Orientation Level Oriented to person;Oriented to place;Oriented to situation  (month and year only )   Following Commands Follows one step commands with increased time or repetition   Comments Pleasant, cooperative     RUE Assessment   RUE Assessment WFL   LUE Assessment   LUE Assessment WFL   RLE Assessment   RLE Assessment WFL  (grossly 4/5) LLE Assessment   LLE Assessment WFL  (grossly 4/5)   Coordination   Movements are Fluid and Coordinated 1   Light Touch   RLE Light Touch Grossly intact   LLE Light Touch Grossly intact   Proprioception   RLE Proprioception Grossly intact   LLE Proprioception Grossly Intact   Bed Mobility   Supine to Sit 5  Supervision   Additional items Increased time required;HOB elevated; Bedrails   Additional Comments increased time  Transfers   Sit to Stand 4  Minimal assistance   Additional items Assist x 1; Increased time required   Stand to Sit 4  Minimal assistance   Additional items Assist x 1; Increased time required;Armrests; Verbal cues   Additional Comments cues for safety/hand placement  Ambulation/Elevation   Gait pattern Decreased foot clearance; Inconsistent tonja; Short stride   Gait Assistance 4  Minimal assist   Additional items Assist x 1;Verbal cues; Tactile cues   Assistive Device Rolling walker   Distance Amb with 'x2 with brief standing rest    Balance   Static Sitting Fair +   Dynamic Sitting Fair   Static Standing Fair -   Dynamic Standing Poor +   Ambulatory Poor +   Endurance Deficit   Endurance Deficit No   Activity Tolerance   Activity Tolerance Patient tolerated treatment well   Medical Staff Made Aware NurseTay Phlegm   Nurse Made Aware yes   Assessment   Prognosis Good   Problem List Decreased strength; Impaired balance;Decreased mobility; Decreased cognition; Impaired judgement;Decreased safety awareness; Impaired vision   Assessment Pt is 79 y/o male admitted with fall, questionable syncopal episode  PT consulted for d/c planning and mobility assessment  Baseline mobility noted to be independent with use of cane outside of apt  Reports no use of AD in apt, but admits to holding onto furniture  Reports decreased balance, initially stating 24 falls in last 6 months, then noting has multiple LOB but able to prevent fall  Actual falls admits to 3  Resides alone    Dtr expresses concerns about d/c to home alone  Presents with mild strength impairments, decreased balance and overall mobility  Shannan for transfers and ambulation with RW  Now requiring more restrictive AD use  Educated on benefit of use of RW for optimal gait stability as noting dislike to RW use  At this time as pt functioning below baseline, requiring Shannan and more restrictive AD and with increased risk for falls, may benefit from STR prior to home alone in order to assure independent and safe function prior to home alone  PT will follow and progress  Barriers to Discharge Decreased caregiver support   Barriers to Discharge Comments lives alone   Goals   Patient Goals go home   STG Expiration Date 11/19/18   Short Term Goal #1 1)  Pt will perform bed mobility with Louann demonstrating appropriate technique 100% of the time in order to improve function  2)  Perform all transfers with Louann demonstrating safe and appropriate technique 100% of the time in order to improve ability to negotiate safely in home environment  3) Amb with least restrictive AD > 200'x1 with mod I in order to demonstrate ability to negotiate in home environment  4)  Improve overall strength and balance 1/2 grade in order to optimize ability to perform functional tasks and reduce fall risk  5) Increase activity tolerance to 45 minutes in order to improve endurance to functional tasks  6)) PT for ongoing patient and family/caregiver education, DME needs and d/c planning in order to promote highest level of function in least restrictive environment  Treatment Day 0   Plan   Treatment/Interventions Functional transfer training;LE strengthening/ROM; Elevations; Therapeutic exercise; Endurance training;Patient/family training;Equipment eval/education; Bed mobility;Gait training; Compensatory technique education;Continued evaluation;Spoke to nursing;OT;Family;Spoke to case management   PT Frequency Other (Comment)  (4-5x/wk)   Recommendation   Recommendation Short-term skilled PT Equipment Recommended Walker  (RW)   PT - OK to Discharge Yes  (to rhb, no to home alone)   Additional Comments must acheive independent function if to home alone     Modified Luz Maria Scale   Modified Luz Maria Scale 4   Barthel Index   Feeding 10   Bathing 0   Grooming Score 5   Dressing Score 5   Bladder Score 10   Bowels Score 10   Toilet Use Score 5   Transfers (Bed/Chair) Score 10   Mobility (Level Surface) Score 10   Stairs Score 0   Barthel Index Score 65     History: co - morbidities, fall risk, use of assistive device, assist for adl's, cognition, multiple lines, lives alone  Exam: impairments in locomotion, musculoskeletal, balance,posture, cognition, sensory/vision, barthel 65  Clinical: unstable/unpredictable ( fall risk, tele, bed/chair alarm)  Complexity:high  Marylen Cheek, PT

## 2018-11-09 NOTE — SOCIAL WORK
Patient denied by [facility] for rehab  Patient accepted at [address] when medically cleared  Patient's daughter Elaine Ovalles able to transport at d/c    CM provided the number of covering  over the weekend to daughter if needed       Janet Robins RN  11/09/18  4:31 PM

## 2018-11-09 NOTE — PLAN OF CARE
Problem: OCCUPATIONAL THERAPY ADULT  Goal: Performs self-care activities at highest level of function for planned discharge setting  See evaluation for individualized goals  Treatment Interventions: ADL retraining, Functional transfer training, Endurance training, UE strengthening/ROM, Patient/family training, Equipment evaluation/education, Compensatory technique education, Energy conservation, Activityengagement          See flowsheet documentation for full assessment, interventions and recommendations  Limitation: Decreased ADL status, Decreased Safe judgement during ADL, Decreased UE strength, Decreased endurance, Decreased self-care trans, Decreased high-level ADLs  Prognosis: Good  Assessment: Pt is a 80 y o  female seen for OT evaluation s/p adm to [facility] on 11/8/2018 s/p fall on toilet, hitting chest on the side of the bathtub  Questionable syncopal episode  Imaging (-) for acute fracture of injury  Comorbidities affecting pts functional performance include a significant PMH of DM, HTN, and Migraine  Pt with active OT orders and activity orders for Up with assistance  Pt lives alone in a one level apt with 0 JENSEN  Pt reports dtr and niece reports able to assist as needed  At baseline, pt was I w/ ADLs, IADLs (reports gets on ladders, moves furniture), and functional mobility/transfers with use of SPC within community, (-) , and reports 3 falls PTA  Upon evaluation, pt currently requires Min A-Supervision for overall ADLs and Min A for functional mobility/transfers 2* the following deficits impacting occupational performance: weakness, decreased strength, decreased balance, decreased tolerance, impaired memory and decreased safety awareness   These impairments, as well at pts limited home support, difficulty performing ADLS, difficulty performing IADLS  and limited insight into deficits limit pts ability to safely engage in all baseline areas of occupation, including grooming, bathing, dressing, toileting, functional mobility/transfers, community mobility, laundry , house maintenance, meal prep, cleaning, social participation  and leisure activities   Pt scored overall 65/100 on the Barthel Index  Based on the aforementioned OT evaluation, functional performance deficits, and assessments, pt has been identified as a moderate complexity evaluation  Pt to continue to benefit from continued acute OT services during hospital stay to address defined deficits and to maximize level of functional independence in the following Occupational Performance areas: grooming, bathing/shower, toilet hygiene, dressing, medication management, socialization, health maintenance, functional mobility, community mobility, clothing management, cleaning, meal prep, household maintenance and social participation  From OT standpoint, recommend STR upon D/C   OT will continue to follow pt 3-5x/wk to address the following goals to  w/in 10-14 days:     OT Discharge Recommendation: Short Term Rehab  OT - OK to Discharge: Yes (when medically cleared to rehab)

## 2018-11-09 NOTE — PLAN OF CARE
Problem: PHYSICAL THERAPY ADULT  Goal: Performs mobility at highest level of function for planned discharge setting  See evaluation for individualized goals  Treatment/Interventions: Functional transfer training, LE strengthening/ROM, Elevations, Therapeutic exercise, Endurance training, Patient/family training, Equipment eval/education, Bed mobility, Gait training, Compensatory technique education, Continued evaluation, Spoke to nursing, OT, Family, Spoke to case management  Equipment Recommended: Donavon Kruger (KIMBERLY)       See flowsheet documentation for full assessment, interventions and recommendations  Outcome: Progressing  Prognosis: Good  Problem List: Decreased strength, Impaired balance, Decreased mobility, Decreased cognition, Impaired judgement, Decreased safety awareness, Impaired vision  Assessment: Pt is 81 y/o male admitted with fall, questionable syncopal episode  PT consulted for d/c planning and mobility assessment  Baseline mobility noted to be independent with use of cane outside of apt  Reports no use of AD in apt, but admits to holding onto furniture  Reports decreased balance, initially stating 24 falls in last 6 months, then noting has multiple LOB but able to prevent fall  Actual falls admits to 3  Resides alone  Dtr expresses concerns about d/c to home alone  Presents with mild strength impairments, decreased balance and overall mobility  Shannan for transfers and ambulation with RW  Now requiring more restrictive AD use  Educated on benefit of use of RW for optimal gait stability as noting dislike to RW use  At this time as pt functioning below baseline, requiring Shannan and more restrictive AD and with increased risk for falls, may benefit from STR prior to home alone in order to assure independent and safe function prior to home alone  PT will follow and progress    Barriers to Discharge: Decreased caregiver support  Barriers to Discharge Comments: lives alone  Recommendation: Short-term skilled PT     PT - OK to Discharge: Yes (to rhb, no to home alone)    See flowsheet documentation for full assessment

## 2018-11-09 NOTE — UTILIZATION REVIEW
Initial Clinical Review    Admission: Date/Time/Statement: OBS 11/8 @ 1554    Orders Placed This Encounter   Procedures    Place in Observation (expected length of stay for this patient is less than two midnights)     Standing Status:   Standing     Number of Occurrences:   1     Order Specific Question:   Admitting Physician     Answer:   Akil De Paz [[id number]]     Order Specific Question:   Level of Care     Answer:   Med Surg [16]       ED: Date/Time/Mode of Arrival:   ED Arrival Information     Expected Arrival Acuity Means of Arrival Escorted By Service Admission Type    - 11/8/2018 12:52 Urgent Ambulance [facility] Urgent    Arrival Complaint    chest pain          Chief Complaint:   Chief Complaint   Patient presents with    Fall     Patient reports this morning she was going to the bathroom, does not recall fall but reports the next memory she has is waking up laying over the side of the bath tub  Unclear from patient's story if she experienced a syncopal episode  Patient reporting chest pain which started after the fall  History of Illness:  Per H&P:  80 y o  female who presents with fall  She was using the toilet and became very weak  She fell off the toilet and hit her chest on the side of the bathtub  No actual loss of consiousness  She then got up and got herself over to use the phone and called her neighbor for help  No actual LOC  She has fallen multiple times at home  Chest pain to palpation of anterior chest well  No eccymosis  No evidence of rib fractures,       ED Vital Signs:   ED Triage Vitals   Temperature Pulse Respirations Blood Pressure SpO2   11/08/18 1317 11/08/18 1317 11/08/18 1317 11/08/18 1317 11/08/18 1317   98 3 °F (36 8 °C) 77 18 138/93 92 %      Temp Source Heart Rate Source Patient Position - Orthostatic VS BP Location FiO2 (%)   11/08/18 1317 11/08/18 1317 11/08/18 1317 11/08/18 1602 --   Oral Monitor Lying Left arm       Pain Score       11/08/18 1317       No Pain        Wt Readings from Last 1 Encounters:   11/08/18 67 2 kg (148 lb 2 4 oz)       Vital Signs (abnormal):   11/08/18 1658 -- 73 18  183/85 98 % -- SW   11/08/18 1602 -- 72 18  211/94 92 %         Abnormal Labs/Diagnostic Test Results:   K 5 7  Cl 99  Glu 327  AST 48  Alk phos 134  Alb 3 1  WBC's 15 13  Urine: small leukocytes,  2+ protein,  Glu >=1000,  Wbc's 4-10  CT Head: Moderate chronic microangiopathic change with no hemorrhage or acute ischemia identified  1 cm left anterior parafalcine mass appears extra-axial on series 2 image 29 consistent with meningioma   No significant mass effect upon or edema within the brain parenchyma  CT Chest/Abd/Pelvis: No acute rheumatic injury of the chest, abdomen or pelvis status post fall  Gallbladder is distended with faint hyperdensity layering posteriorly suggesting sludge or tiny calcified stones   Consider right upper quadrant ultrasound and correlation with liver function testing  Diffuse osteopenia and degenerative endplate hypertrophic changes of the thoracic and lumbar spine   No acute osseous abnormality  Diffuse fecal stasis   Colonic diverticulosis  CT Thoracolumbar Diffuse idiopathic skeletal hyperostosis of the thoracic and lumbar spine without evidence of acute trauma  CT C Spine: Osteopenia and degenerative change of the cervical spine with no acute fracture identified  Thyroid gland is enlarged and heterogeneous   This could be further evaluated with thyroid ultrasound follow-up   Considerations related to the patient's age and/or comorbidities may be used to alter these recommendations     CXR: No acute cardiopulmonary disease    ED Treatment:   Medication Administration from 11/08/2018 1252 to 11/08/2018 1739     None          Past Medical/Surgical History:   Past Medical History:   Diagnosis Date    Diabetes mellitus ([facility] [address] )     Hypertension     Migraine        Admitting Diagnosis: Syncope [R55]  Chest pain [R07 9]  Fall, initial encounter [W19  XXXA]    Age/Sex: 80 y o  female    Assessment/Plan: 81 yo female:  [address] [place] [address] [place]     Multiple falls at home  Some chest wall trauma  Daughter wants rehab        Anticipated Length of Stay:  Patient will be admitted on an Observation basis with an anticipated length of stay of  Less than 2 midnights  Justification for Hospital Stay: evaluation for falls    Admission Orders: OBS TELE  CBC, BMP, Mag in am    Scheduled Meds:   Current Facility-Administered Medications:  carvedilol 12 5 mg Oral BID With Meals Titi Prechtel, DO    glimepiride 2 mg Oral Daily With Breakfast Titi Prechtel, DO    heparin (porcine) 5,000 Units Subcutaneous Q8H [facility] Titi Prechtel, DO    lisinopril 5 mg Oral Daily Titi Prechtel, DO    pravastatin 40 mg Oral Daily With Limbo and [employer] Prechtel, DO    sodium chloride 75 mL/hr Intravenous Continuous Titi Prechtel, DO Last Rate: 75 mL/hr (11/08/18 1850)   traMADol 50 mg Oral Q6H PRN Titi Prechtel, DO      Continuous Infusions:   sodium chloride 75 mL/hr Last Rate: 75 mL/hr (11/08/18 1850)     PRN Meds: traMADol x 1  11/9:    [address] Utilization Review Department  Phone: [phone]; Fax [phone]  [email or web address]  ATTENTION: Please call with any questions or concerns to [phone]  and carefully listen to the prompts so that you are directed to the right person  Send all requests for admission clinical reviews, approved or denied determinations and any other requests to fax [phone]   All voicemails are confidential

## 2018-11-09 NOTE — PLAN OF CARE
Problem: DISCHARGE PLANNING - CARE MANAGEMENT  Goal: Discharge to post-acute care or home with appropriate resources  INTERVENTIONS:  - Conduct assessment to determine patient/family and health care team treatment goals, and need for post-acute services based on payer coverage, community resources, and patient preferences, and barriers to discharge  - Address psychosocial, clinical, and financial barriers to discharge as identified in assessment in conjunction with the patient/family and health care team  - Arrange appropriate level of post-acute services according to patients   needs and preference and payer coverage in collaboration with the physician and health care team  - Communicate with and update the patient/family, physician, and health care team regarding progress on the discharge plan  - Arrange appropriate transportation to post-acute venues   Outcome: Progressing  Referrals sent to [facility] [employer] and [facility]  Waiting on bed availability, will f/u

## 2018-11-10 ENCOUNTER — APPOINTMENT (INPATIENT)
Dept: RADIOLOGY | Facility: HOSPITAL | Age: 83
DRG: 194 | End: 2018-11-10
Payer: MEDICARE

## 2018-11-10 LAB
ANION GAP SERPL CALCULATED.3IONS-SCNC: 6 MMOL/L (ref 4–13)
BUN SERPL-MCNC: 18 MG/DL (ref 5–25)
CALCIUM SERPL-MCNC: 8.8 MG/DL (ref 8.3–10.1)
CHLORIDE SERPL-SCNC: 103 MMOL/L (ref 100–108)
CO2 SERPL-SCNC: 29 MMOL/L (ref 21–32)
CREAT SERPL-MCNC: 1.25 MG/DL (ref 0.6–1.3)
ERYTHROCYTE [DISTWIDTH] IN BLOOD BY AUTOMATED COUNT: 13.2 % (ref 11.6–15.1)
EST. AVERAGE GLUCOSE BLD GHB EST-MCNC: 232 MG/DL
GFR SERPL CREATININE-BSD FRML MDRD: 37 ML/MIN/1.73SQ M
GLUCOSE SERPL-MCNC: 164 MG/DL (ref 65–140)
GLUCOSE SERPL-MCNC: 173 MG/DL (ref 65–140)
GLUCOSE SERPL-MCNC: 201 MG/DL (ref 65–140)
GLUCOSE SERPL-MCNC: 208 MG/DL (ref 65–140)
GLUCOSE SERPL-MCNC: 220 MG/DL (ref 65–140)
HBA1C MFR BLD: 9.7 % (ref 4.2–6.3)
HCT VFR BLD AUTO: 39.4 % (ref 34.8–46.1)
HGB BLD-MCNC: 12.2 G/DL (ref 11.5–15.4)
MCH RBC QN AUTO: 27.2 PG (ref 26.8–34.3)
MCHC RBC AUTO-ENTMCNC: 31 G/DL (ref 31.4–37.4)
MCV RBC AUTO: 88 FL (ref 82–98)
PLATELET # BLD AUTO: 332 THOUSANDS/UL (ref 149–390)
PMV BLD AUTO: 9.6 FL (ref 8.9–12.7)
POTASSIUM SERPL-SCNC: 4.2 MMOL/L (ref 3.5–5.3)
RBC # BLD AUTO: 4.48 MILLION/UL (ref 3.81–5.12)
SODIUM SERPL-SCNC: 138 MMOL/L (ref 136–145)
WBC # BLD AUTO: 9.79 THOUSAND/UL (ref 4.31–10.16)

## 2018-11-10 PROCEDURE — 83036 HEMOGLOBIN GLYCOSYLATED A1C: CPT | Performed by: NURSE PRACTITIONER

## 2018-11-10 PROCEDURE — 87040 BLOOD CULTURE FOR BACTERIA: CPT | Performed by: HOSPITALIST

## 2018-11-10 PROCEDURE — 80048 BASIC METABOLIC PNL TOTAL CA: CPT | Performed by: NURSE PRACTITIONER

## 2018-11-10 PROCEDURE — 71045 X-RAY EXAM CHEST 1 VIEW: CPT

## 2018-11-10 PROCEDURE — 82948 REAGENT STRIP/BLOOD GLUCOSE: CPT

## 2018-11-10 PROCEDURE — 85027 COMPLETE CBC AUTOMATED: CPT | Performed by: NURSE PRACTITIONER

## 2018-11-10 PROCEDURE — 99232 SBSQ HOSP IP/OBS MODERATE 35: CPT | Performed by: HOSPITALIST

## 2018-11-10 RX ORDER — TRAMADOL HYDROCHLORIDE 50 MG/1
50 TABLET ORAL EVERY 8 HOURS PRN
Qty: 30 TABLET | Refills: 0 | Status: SHIPPED | OUTPATIENT
Start: 2018-11-10 | End: 2018-11-13

## 2018-11-10 RX ORDER — ACETAMINOPHEN 325 MG/1
650 TABLET ORAL EVERY 6 HOURS PRN
Status: DISCONTINUED | OUTPATIENT
Start: 2018-11-10 | End: 2018-11-13 | Stop reason: HOSPADM

## 2018-11-10 RX ADMIN — LISINOPRIL 5 MG: 5 TABLET ORAL at 09:15

## 2018-11-10 RX ADMIN — ACETAMINOPHEN 650 MG: 325 TABLET, FILM COATED ORAL at 16:21

## 2018-11-10 RX ADMIN — HEPARIN SODIUM 5000 UNITS: 5000 INJECTION INTRAVENOUS; SUBCUTANEOUS at 22:05

## 2018-11-10 RX ADMIN — HEPARIN SODIUM 5000 UNITS: 5000 INJECTION INTRAVENOUS; SUBCUTANEOUS at 05:27

## 2018-11-10 RX ADMIN — INSULIN LISPRO 1 UNITS: 100 INJECTION, SOLUTION INTRAVENOUS; SUBCUTANEOUS at 17:46

## 2018-11-10 RX ADMIN — INSULIN LISPRO 1 UNITS: 100 INJECTION, SOLUTION INTRAVENOUS; SUBCUTANEOUS at 11:59

## 2018-11-10 RX ADMIN — INSULIN LISPRO 2 UNITS: 100 INJECTION, SOLUTION INTRAVENOUS; SUBCUTANEOUS at 22:06

## 2018-11-10 RX ADMIN — TRAMADOL HYDROCHLORIDE 50 MG: 50 TABLET, FILM COATED ORAL at 22:10

## 2018-11-10 RX ADMIN — PRAVASTATIN SODIUM 40 MG: 40 TABLET ORAL at 17:46

## 2018-11-10 RX ADMIN — INSULIN LISPRO 1 UNITS: 100 INJECTION, SOLUTION INTRAVENOUS; SUBCUTANEOUS at 09:16

## 2018-11-10 RX ADMIN — HEPARIN SODIUM 5000 UNITS: 5000 INJECTION INTRAVENOUS; SUBCUTANEOUS at 13:48

## 2018-11-10 RX ADMIN — CARVEDILOL 12.5 MG: 12.5 TABLET, FILM COATED ORAL at 17:46

## 2018-11-10 RX ADMIN — CARVEDILOL 12.5 MG: 12.5 TABLET, FILM COATED ORAL at 09:15

## 2018-11-10 NOTE — DISCHARGE SUMMARY
Discharge- Lashawn Diego 5/2/1923, 80 y o  female MRN: [id number]    Unit/Bed#: E4 -01 Encounter: [id number]    Primary Care Provider: Az Lockwood DO   Date and time admitted to hospital: 11/8/2018 12:52 PM        [address] [place] [address]    Multiple falls at home  Going to rehab     * Syncope and collapse   Assessment & Plan    · Questionable syncope  · Echo ok  · Carotid u/s done, but still yet to be read         Discharging Physician / Practitioner: Jacque Vargas DO  PCP: Az Lockwood DO  Admission Date:   Admission Orders     Ordered        11/09/18 1330  Inpatient Admission  Once         11/08/18 1554  Place in Observation (expected length of stay for this patient is less than two midnights)  Once             Discharge Date: 11/10/18    Resolved Problems  Date Reviewed: 11/10/2018    None                Procedures Performed:     · Echocardiogram  · Carotid u/s      Reason for Admission: fall      Hospital Course:     Lashawn Diego is a 80 y o  female patient who originally presented to the hospital on 11/8/2018 due to fall  She fell of the toiltet and hit her anterior chest    It is unclear if she had a loss of consiousness  She was having noncardiac chest pain from the chest wall trauma  She ruled out for MI  She is very weak with walking  She is at high rsik for falling  We are sending her to short term rehab    Please see above list of diagnoses and related plan for additional information         Condition at Discharge: fair       Discharge Day Visit / Exam:     Subjective:  Feels better  Still weak      Vitals: Blood Pressure: 159/61 (11/10/18 1100)  Pulse: 73 (11/10/18 1100)  Temperature: 100 3 °F (37 9 °C) (11/10/18 1100)  Temp Source: Tympanic (11/10/18 1100)  Respirations: 18 (11/10/18 1100)  Height: 5' 3" (160 cm) (11/08/18 1744)  Weight - Scale: 67 2 kg (148 lb 2 4 oz) (11/08/18 1317)  SpO2: 96 % (11/10/18 1100)    Exam:     Physical Exam   HENT:   Head: Normocephalic and atraumatic  Eyes: Pupils are equal, round, and reactive to light  EOM are normal    Cardiovascular: Normal rate and regular rhythm  Exam reveals no gallop and no friction rub  No murmur heard  Pulmonary/Chest: Effort normal and breath sounds normal  She has no wheezes  She has no rales  Abdominal: Soft  There is no tenderness  Musculoskeletal: She exhibits no edema  Nursing note and vitals reviewed            Discharge instructions/Information to patient and family:   See after visit summary for information provided to patient and family  Provisions for Follow-Up Care:  See after visit summary for information related to follow-up care and any pertinent home health orders  Disposition:     Other: short term rehab       Discharge Statement:  I spent 20 minutes discharging the patient  This time was spent on the day of discharge  I had direct contact with the patient on the day of discharge  Greater than 50% of the total time was spent examining patient, answering all patient questions, arranging and discussing plan of care with patient as well as directly providing post-discharge instructions  Additional time then spent on discharge activities  Discharge Medications:  See after visit summary for reconciled discharge medications provided to patient and family        ** Please Note: This note has been constructed using a voice recognition system **

## 2018-11-10 NOTE — PROGRESS NOTES
Progress Note - Vernal Cake 1923, 80 y o  female MRN: [id number]    Unit/Bed#: E4 -01 Encounter: [id number]    Primary Care Provider: Az Lockwood DO   Date and time admitted to hospital: 2018 12:52 PM        Chest pain   Assessment & Plan    Non cardiac  Due to chest wall trauma       Fall   Assessment & Plan    Multiple falls at home  Going to rehab, but not approved until Monday             Subjective:    still feels very weak  No chest pain      Objective:     Vitals:   Temp (24hrs), Av 5 °F (37 5 °C), Min:97 6 °F (36 4 °C), Max:100 3 °F (37 9 °C)    Temp:  [97 6 °F (36 4 °C)-100 3 °F (37 9 °C)] 100 3 °F (37 9 °C)  HR:  [73-87] 73  Resp:  [18-20] 18  BP: (128-170)/(56-90) 159/61  SpO2:  [92 %-96 %] 96 %  Body mass index is 26 24 kg/m²  Input and Output Summary (last 24 hours): Intake/Output Summary (Last 24 hours) at 11/10/18 1449  Last data filed at 11/10/18 0900   Gross per 24 hour   Intake              240 ml   Output                0 ml   Net              240 ml       Physical Exam:     Physical Exam   HENT:   Head: Normocephalic and atraumatic  Eyes: Pupils are equal, round, and reactive to light  EOM are normal    Cardiovascular: Normal rate and regular rhythm  Exam reveals no gallop and no friction rub  No murmur heard  Pulmonary/Chest: Effort normal and breath sounds normal  She has no wheezes  She has no rales  Abdominal: Soft  There is no tenderness  Musculoskeletal: She exhibits no edema  Nursing note and vitals reviewed              Additional Data:     Labs:      Results from last 7 days  Lab Units 11/10/18  0533 18  0419   WBC Thousand/uL 9 79 8 60   HEMOGLOBIN g/dL 12 2 12 6   HEMATOCRIT % 39 4 40 2   PLATELETS Thousands/uL 332 319   NEUTROS PCT %  --  64   LYMPHS PCT %  --  23   MONOS PCT %  --  11   EOS PCT %  --  1       Results from last 7 days  Lab Units 11/10/18  0533  18  1329   POTASSIUM mmol/L 4 2  < > 5 7*   CHLORIDE mmol/L 103  < > 99*   CO2 mmol/L 29  < > 32   BUN mg/dL 18  < > 19   CREATININE mg/dL 1 25  < > 1 21   CALCIUM mg/dL 8 8  < > 9 1   ALK PHOS U/L  --   --  134*   ALT U/L  --   --  18   AST U/L  --   --  48*   < > = values in this interval not displayed  Results from last 7 days  Lab Units 11/08/18  1428   INR  1 02       Results from last 7 days  Lab Units 11/10/18  1137 11/10/18  0803 11/09/18  2050 11/09/18  1728   POC GLUCOSE mg/dl 220* 164* 249* 197*       Results from last 7 days  Lab Units 11/10/18  0533   HEMOGLOBIN A1C % 9 7*           * I Have Reviewed All Lab Data     Recent Cultures (last 7 days):             Last 24 Hours Medication List:     Current Facility-Administered Medications:  carvedilol 12 5 mg Oral BID With Meals Titi Prechtel, DO   heparin (porcine) 5,000 Units Subcutaneous Q8H [facility] Titi Prechtel, DO   insulin lispro 1-5 Units Subcutaneous TID AC Johnson Cheeks, CRNP   insulin lispro 1-5 Units Subcutaneous HS Johnson Cheeks, CRNP   lisinopril 5 mg Oral Daily Titi Prechtel, DO   pravastatin 40 mg Oral Daily With [employer] Prechtel, DO   traMADol 50 mg Oral Q6H PRN Gray Critchley, DO         VTE Pharmacologic Prophylaxis:   Pharmacologic:   Current Length of Stay: 1 day(s)    Current Patient Status: Inpatient       Discharge Plan:   Code Status: Level 3 - DNAR and DNI           Today, Patient Was Seen By: Antonio Petit DO    ** Please Note: Dictation voice to text software may have been used in the creation of this document   **

## 2018-11-10 NOTE — PHYSICIAN ADVISOR
Current patient class: Inpatient  The patient is currently on Hospital Day: 2 at [address] [facility]      The patient was admitted to the hospital at 1330 on 11/9/18 for the following diagnosis:  Syncope [R55]  Chest pain [R07 9]       There is documentation in the medical record of an expected length of stay of at least 2 midnights  The patient is therefore expected to satisfy the 2 midnight benchmark and given the 2 midnight presumption is appropriate for INPATIENT ADMISSION  Given this expectation of a satisfying stay, CMS instructs us that the patient is most often appropriate for inpatient admission under part A provided medical necessity is documented in the chart  After review of the relevant documentation, labs, vital signs and test results, the patient is appropriate for INPATIENT ADMISSION  Admission to the hospital as an inpatient is a complex decision making process which requires the practitioner to consider the patients presenting complaint, history and physical examination and all relevant testing  With this in mind, in this case, the patient was deemed appropriate for INPATIENT ADMISSION  After review of the documentation and testing available at the time of the admission I concur with this clinical determination of medical necessity  Rationale is as follows: The patient is a 80 yrs old Female who presented to the ED at 11/8/2018 12:52 PM with a chief complaint of Fall (Patient reports this morning she was going to the bathroom, does not recall fall but reports the next memory she has is waking up laying over the side of the bath tub  Unclear from patient's story if she experienced a syncopal episode  Patient reporting chest pain which started after the fall  )     Given the need for further hospitalization, and along with the documentation of medical necessity present in the chart, the patient is appropriate for inpatient admission    The patient is expected to satisfy the 2 midnight benchmark, and will require further acute medical care  The patient does have comorbid conditions which increases the risk for significant adverse outcome  Given this the patient is appropriate for inpatient admission  The patients vitals on arrival were ED Triage Vitals   Temperature Pulse Respirations Blood Pressure SpO2   11/08/18 1317 11/08/18 1317 11/08/18 1317 11/08/18 1317 11/08/18 1317   98 3 °F (36 8 °C) 77 18 138/93 92 %      Temp Source Heart Rate Source Patient Position - Orthostatic VS BP Location FiO2 (%)   11/08/18 1317 11/08/18 1317 11/08/18 1317 11/08/18 1602 --   Oral Monitor Lying Left arm       Pain Score       11/08/18 1317       No Pain           Past Medical History:   Diagnosis Date    Diabetes mellitus ([facility] [address] )     Hypertension     Migraine      History reviewed  No pertinent surgical history  Consults have been placed to:   IP CONSULT TO CASE MANAGEMENT  IP CONSULT TO CASE MANAGEMENT    Vitals:    11/09/18 1830 11/09/18 1833 11/09/18 1836 11/09/18 1900   BP: 128/67 151/78 164/90 158/69   BP Location: Right arm Right arm Right arm Right arm   Pulse: 80 84 84 87   Resp:    18   Temp:    99 6 °F (37 6 °C)   TempSrc:    Tympanic   SpO2:    92%   Weight:       Height:           Most recent labs:    Recent Labs      11/08/18   1329  11/08/18   1428   11/09/18   0419  11/09/18   0437   WBC  15 13*   --    --   8 60   --    HGB  12 9   --    --   12 6   --    HCT  40 5   --    --   40 2   --    PLT  263   --    --   319   --    K  5 7*   --    < >   --   3 9   CALCIUM  9 1   --    < >   --   8 8   BUN  19   --    < >   --   17   CREATININE  1 21   --    < >   --   1 21   INR   --   1 02   --    --    --    TROPONINI  <0 02   --    --    --    --    AST  48*   --    --    --    --    ALT  18   --    --    --    --    ALKPHOS  134*   --    --    --    --     < > = values in this interval not displayed         Scheduled Meds:  Current Facility-Administered Medications:  carvedilol 12 5 mg Oral BID With Meals Titi Prechtel, DO   heparin (porcine) 5,000 Units Subcutaneous Q8H [address] Titi Prechtel, DO   insulin lispro 1-5 Units Subcutaneous TID AC SAMIRA Leon   insulin lispro 1-5 Units Subcutaneous HS SAMIRA Leon   lisinopril 5 mg Oral Daily Titi Prechtel, DO   pravastatin 40 mg Oral Daily With [employer] Prechtel, DO   traMADol 50 mg Oral Q6H PRN Titi Prechtel, DO     Continuous Infusions:   PRN Meds: traMADol    Surgical procedures (if appropriate):

## 2018-11-10 NOTE — SOCIAL WORK
9:12  Weekend covering CM received a call from dtr stating that pt wants to go to [place] [id number] ()  CM informed dtr a referral would have to be sent, and that unsure if the admission's office is staffed on the weekend, so a reply MAY be given today, or NOT, and if not, then no answer would be provided until Monday when staff comes in  Dtr stated pt was adamant that she wanted [facility]  Dtr reported that maybe things should be left as they were then, but CM did send the referral to see  Weekend covering CM then tiger-texttao CORREA to inform her if pt clears this weekend, to let her know so  can be updated if  is they accepted pt     15:33  The referral that CM sent to [facility] [id number] was responded to and they noticed that pt did not yet have her 3 qualifying midnights, but they did agree to accept pt  Nancy from  did reached out to dtr, Krystal Olivera  Dtr accepted bed and will transport on Monday, when pt is able to d/c to have Medicare cover the cost      This weekend covering CM sent an email to assigned CM to make her aware of Monday d/c      11/11/18   9:06  Notified  that pt accepted bed at

## 2018-11-11 PROBLEM — R50.9 FEVER: Status: ACTIVE | Noted: 2018-11-11

## 2018-11-11 LAB
BACTERIA UR QL AUTO: ABNORMAL /HPF
BILIRUB UR QL STRIP: NEGATIVE
CLARITY UR: CLEAR
COLOR UR: YELLOW
GLUCOSE SERPL-MCNC: 154 MG/DL (ref 65–140)
GLUCOSE SERPL-MCNC: 243 MG/DL (ref 65–140)
GLUCOSE SERPL-MCNC: 255 MG/DL (ref 65–140)
GLUCOSE SERPL-MCNC: 304 MG/DL (ref 65–140)
GLUCOSE UR STRIP-MCNC: ABNORMAL MG/DL
HGB UR QL STRIP.AUTO: ABNORMAL
KETONES UR STRIP-MCNC: NEGATIVE MG/DL
LEUKOCYTE ESTERASE UR QL STRIP: ABNORMAL
NITRITE UR QL STRIP: NEGATIVE
NON-SQ EPI CELLS URNS QL MICRO: ABNORMAL /HPF
PH UR STRIP.AUTO: 5.5 [PH] (ref 4.5–8)
PROT UR STRIP-MCNC: ABNORMAL MG/DL
RBC #/AREA URNS AUTO: ABNORMAL /HPF
SP GR UR STRIP.AUTO: 1.02 (ref 1–1.03)
UROBILINOGEN UR QL STRIP.AUTO: 0.2 E.U./DL
WBC #/AREA URNS AUTO: ABNORMAL /HPF

## 2018-11-11 PROCEDURE — 99232 SBSQ HOSP IP/OBS MODERATE 35: CPT | Performed by: HOSPITALIST

## 2018-11-11 PROCEDURE — 82948 REAGENT STRIP/BLOOD GLUCOSE: CPT

## 2018-11-11 PROCEDURE — 81001 URINALYSIS AUTO W/SCOPE: CPT | Performed by: HOSPITALIST

## 2018-11-11 RX ORDER — ONDANSETRON 2 MG/ML
4 INJECTION INTRAMUSCULAR; INTRAVENOUS EVERY 4 HOURS PRN
Status: DISCONTINUED | OUTPATIENT
Start: 2018-11-11 | End: 2018-11-13 | Stop reason: HOSPADM

## 2018-11-11 RX ADMIN — PRAVASTATIN SODIUM 40 MG: 40 TABLET ORAL at 17:25

## 2018-11-11 RX ADMIN — CEFTRIAXONE SODIUM 1000 MG: 10 INJECTION, POWDER, FOR SOLUTION INTRAVENOUS at 10:04

## 2018-11-11 RX ADMIN — ONDANSETRON 4 MG: 2 INJECTION INTRAMUSCULAR; INTRAVENOUS at 10:04

## 2018-11-11 RX ADMIN — HEPARIN SODIUM 5000 UNITS: 5000 INJECTION INTRAVENOUS; SUBCUTANEOUS at 05:30

## 2018-11-11 RX ADMIN — INSULIN LISPRO 3 UNITS: 100 INJECTION, SOLUTION INTRAVENOUS; SUBCUTANEOUS at 12:01

## 2018-11-11 RX ADMIN — CARVEDILOL 12.5 MG: 12.5 TABLET, FILM COATED ORAL at 10:04

## 2018-11-11 RX ADMIN — CARVEDILOL 12.5 MG: 12.5 TABLET, FILM COATED ORAL at 17:25

## 2018-11-11 RX ADMIN — HEPARIN SODIUM 5000 UNITS: 5000 INJECTION INTRAVENOUS; SUBCUTANEOUS at 21:45

## 2018-11-11 RX ADMIN — INSULIN LISPRO 1 UNITS: 100 INJECTION, SOLUTION INTRAVENOUS; SUBCUTANEOUS at 10:04

## 2018-11-11 RX ADMIN — TRAMADOL HYDROCHLORIDE 50 MG: 50 TABLET, FILM COATED ORAL at 21:48

## 2018-11-11 RX ADMIN — LISINOPRIL 5 MG: 5 TABLET ORAL at 10:04

## 2018-11-11 RX ADMIN — INSULIN LISPRO 2 UNITS: 100 INJECTION, SOLUTION INTRAVENOUS; SUBCUTANEOUS at 17:25

## 2018-11-11 RX ADMIN — HEPARIN SODIUM 5000 UNITS: 5000 INJECTION INTRAVENOUS; SUBCUTANEOUS at 14:55

## 2018-11-11 RX ADMIN — INSULIN LISPRO 2 UNITS: 100 INJECTION, SOLUTION INTRAVENOUS; SUBCUTANEOUS at 21:45

## 2018-11-11 NOTE — ASSESSMENT & PLAN NOTE
· Questionable syncope  · Echo ok  · Carotid u/s done, but still yet to be read  · Waiting for approval for rehab

## 2018-11-11 NOTE — ASSESSMENT & PLAN NOTE
Had fever, likely acute cystitis  On empiric Rocephin  Can be changed to Keflex when she is ready for discharge

## 2018-11-11 NOTE — PROGRESS NOTES
Progress Note - Porfirio Begum 1923, 80 y o  female MRN: [id number]    Unit/Bed#: E4 -01 Encounter: [id number]    Primary Care Provider: Lorie Sampson DO   Date and time admitted to hospital: 2018 12:52 PM        * Syncope and collapse   Assessment & Plan    · Questionable syncope  · Echo ok  · Carotid u/s done, but still yet to be read  · Waiting for approval for rehab     Fall   Assessment & Plan    Multiple falls at home  Going to rehab, but not approved until Monday       Chest pain   Assessment & Plan    Non cardiac  Due to chest wall trauma  On prn tramadol       Fever   Assessment & Plan    Had fever, likely acute cystitis  On empiric Rocephin  Can be changed to Keflex when she is ready for discharge           Subjective:   Feels better  Had fever yesterday, but that has resolved  No cough  No dysuria  Weakness is improving  She is agreeable to short term rehab      Objective:     Vitals:   Temp (24hrs), Av 4 °F (37 4 °C), Min:98 °F (36 7 °C), Max:101 9 °F (38 8 °C)    Temp:  [98 °F (36 7 °C)-101 9 °F (38 8 °C)] 98 6 °F (37 °C)  HR:  [69-80] 69  Resp:  [16-20] 18  BP: (111-144)/(56-67) 115/57  SpO2:  [87 %-94 %] 94 %  Body mass index is 26 24 kg/m²  Input and Output Summary (last 24 hours): Intake/Output Summary (Last 24 hours) at 18 1327  Last data filed at 18 1034   Gross per 24 hour   Intake              590 ml   Output              300 ml   Net              290 ml       Physical Exam:     Physical Exam   HENT:   Head: Normocephalic and atraumatic  Eyes: Pupils are equal, round, and reactive to light  EOM are normal    Cardiovascular: Normal rate and regular rhythm  Exam reveals no gallop and no friction rub  No murmur heard  Pulmonary/Chest: Effort normal and breath sounds normal  She has no wheezes  She has no rales  Abdominal: Soft  There is no tenderness  Musculoskeletal: She exhibits no edema     Mild tenderness to palpation of anterior chest wall   No eccymosis  No evidence of rib fracture   Nursing note and vitals reviewed          Additional Data:     Labs:      Results from last 7 days  Lab Units 11/10/18  0533 11/09/18  0419   WBC Thousand/uL 9 79 8 60   HEMOGLOBIN g/dL 12 2 12 6   HEMATOCRIT % 39 4 40 2   PLATELETS Thousands/uL 332 319   NEUTROS PCT %  --  64   LYMPHS PCT %  --  23   MONOS PCT %  --  11   EOS PCT %  --  1       Results from last 7 days  Lab Units 11/10/18  0533  11/08/18  1329   POTASSIUM mmol/L 4 2  < > 5 7*   CHLORIDE mmol/L 103  < > 99*   CO2 mmol/L 29  < > 32   BUN mg/dL 18  < > 19   CREATININE mg/dL 1 25  < > 1 21   CALCIUM mg/dL 8 8  < > 9 1   ALK PHOS U/L  --   --  134*   ALT U/L  --   --  18   AST U/L  --   --  48*   < > = values in this interval not displayed      Results from last 7 days  Lab Units 11/08/18  1428   INR  1 02       Results from last 7 days  Lab Units 11/11/18  1111 11/11/18  0815 11/10/18  2117 11/10/18  1628 11/10/18  1137 11/10/18  0803 11/09/18  2050 11/09/18  1728   POC GLUCOSE mg/dl 304* 154* 201* 208* 220* 164* 249* 197*       Results from last 7 days  Lab Units 11/10/18  0533   HEMOGLOBIN A1C % 9 7*           * I Have Reviewed All Lab Data     Recent Cultures (last 7 days):             Last 24 Hours Medication List:     Current Facility-Administered Medications:  acetaminophen 650 mg Oral Q6H PRN Titi Prechtel, DO    carvedilol 12 5 mg Oral BID With Meals Titi Prechtel, DO    cefTRIAXone 1,000 mg Intravenous Q24H Titi Prechtel, DO Last Rate: Stopped (11/11/18 1034)   heparin (porcine) 5,000 Units Subcutaneous Q8H [address] Titi Prechtel, DO    insulin lispro 1-5 Units Subcutaneous TID AC SAMIRA Pacheco    insulin lispro 1-5 Units Subcutaneous HS SAMIRA Pacheco    lisinopril 5 mg Oral Daily Titi Prechtel, DO    ondansetron 4 mg Intravenous Q4H PRN Titi Prechtel, DO    pravastatin 40 mg Oral Daily With [employer] Prechtel, DO    traMADol 50 mg Oral Q6H PRN Dino Larios DO          VTE Pharmacologic Prophylaxis:   Pharmacologic: Heparin      Current Length of Stay: 2 day(s)    Current Patient Status: Inpatient       Discharge Plan: ready for short term rehab when bed is approved    Code Status: Level 3 - DNAR and DNI           Today, Patient Was Seen By: Tania Birhc DO    ** Please Note: Dictation voice to text software may have been used in the creation of this document   **

## 2018-11-12 ENCOUNTER — APPOINTMENT (INPATIENT)
Dept: RADIOLOGY | Facility: HOSPITAL | Age: 83
DRG: 194 | End: 2018-11-12
Payer: MEDICARE

## 2018-11-12 PROBLEM — R55 SYNCOPE AND COLLAPSE: Status: RESOLVED | Noted: 2018-11-09 | Resolved: 2018-11-12

## 2018-11-12 PROBLEM — R50.9 FEVER: Status: RESOLVED | Noted: 2018-11-11 | Resolved: 2018-11-12

## 2018-11-12 PROBLEM — J18.9 PNEUMONIA DUE TO INFECTIOUS ORGANISM: Status: ACTIVE | Noted: 2018-11-12

## 2018-11-12 LAB
ANION GAP SERPL CALCULATED.3IONS-SCNC: 6 MMOL/L (ref 4–13)
BASOPHILS # BLD AUTO: 0.03 THOUSANDS/ΜL (ref 0–0.1)
BASOPHILS NFR BLD AUTO: 0 % (ref 0–1)
BUN SERPL-MCNC: 22 MG/DL (ref 5–25)
CALCIUM SERPL-MCNC: 9 MG/DL (ref 8.3–10.1)
CHLORIDE SERPL-SCNC: 101 MMOL/L (ref 100–108)
CO2 SERPL-SCNC: 29 MMOL/L (ref 21–32)
CREAT SERPL-MCNC: 1.2 MG/DL (ref 0.6–1.3)
EOSINOPHIL # BLD AUTO: 0.29 THOUSAND/ΜL (ref 0–0.61)
EOSINOPHIL NFR BLD AUTO: 3 % (ref 0–6)
ERYTHROCYTE [DISTWIDTH] IN BLOOD BY AUTOMATED COUNT: 13.2 % (ref 11.6–15.1)
GFR SERPL CREATININE-BSD FRML MDRD: 39 ML/MIN/1.73SQ M
GLUCOSE SERPL-MCNC: 176 MG/DL (ref 65–140)
GLUCOSE SERPL-MCNC: 185 MG/DL (ref 65–140)
GLUCOSE SERPL-MCNC: 198 MG/DL (ref 65–140)
GLUCOSE SERPL-MCNC: 254 MG/DL (ref 65–140)
HCT VFR BLD AUTO: 39.4 % (ref 34.8–46.1)
HGB BLD-MCNC: 12.3 G/DL (ref 11.5–15.4)
IMM GRANULOCYTES # BLD AUTO: 0.03 THOUSAND/UL (ref 0–0.2)
IMM GRANULOCYTES NFR BLD AUTO: 0 % (ref 0–2)
LYMPHOCYTES # BLD AUTO: 1.65 THOUSANDS/ΜL (ref 0.6–4.47)
LYMPHOCYTES NFR BLD AUTO: 17 % (ref 14–44)
MCH RBC QN AUTO: 27.5 PG (ref 26.8–34.3)
MCHC RBC AUTO-ENTMCNC: 31.2 G/DL (ref 31.4–37.4)
MCV RBC AUTO: 88 FL (ref 82–98)
MONOCYTES # BLD AUTO: 1.35 THOUSAND/ΜL (ref 0.17–1.22)
MONOCYTES NFR BLD AUTO: 14 % (ref 4–12)
NEUTROPHILS # BLD AUTO: 6.55 THOUSANDS/ΜL (ref 1.85–7.62)
NEUTS SEG NFR BLD AUTO: 66 % (ref 43–75)
NRBC BLD AUTO-RTO: 0 /100 WBCS
PLATELET # BLD AUTO: 335 THOUSANDS/UL (ref 149–390)
PMV BLD AUTO: 9.5 FL (ref 8.9–12.7)
POTASSIUM SERPL-SCNC: 4.4 MMOL/L (ref 3.5–5.3)
RBC # BLD AUTO: 4.47 MILLION/UL (ref 3.81–5.12)
SODIUM SERPL-SCNC: 136 MMOL/L (ref 136–145)
WBC # BLD AUTO: 9.9 THOUSAND/UL (ref 4.31–10.16)

## 2018-11-12 PROCEDURE — 93880 EXTRACRANIAL BILAT STUDY: CPT | Performed by: SURGERY

## 2018-11-12 PROCEDURE — 85025 COMPLETE CBC W/AUTO DIFF WBC: CPT | Performed by: HOSPITALIST

## 2018-11-12 PROCEDURE — 82948 REAGENT STRIP/BLOOD GLUCOSE: CPT

## 2018-11-12 PROCEDURE — 99239 HOSP IP/OBS DSCHRG MGMT >30: CPT | Performed by: NURSE PRACTITIONER

## 2018-11-12 PROCEDURE — 94760 N-INVAS EAR/PLS OXIMETRY 1: CPT

## 2018-11-12 PROCEDURE — 71046 X-RAY EXAM CHEST 2 VIEWS: CPT

## 2018-11-12 PROCEDURE — 80048 BASIC METABOLIC PNL TOTAL CA: CPT | Performed by: HOSPITALIST

## 2018-11-12 PROCEDURE — 99232 SBSQ HOSP IP/OBS MODERATE 35: CPT | Performed by: FAMILY MEDICINE

## 2018-11-12 PROCEDURE — 94640 AIRWAY INHALATION TREATMENT: CPT

## 2018-11-12 PROCEDURE — 97535 SELF CARE MNGMENT TRAINING: CPT

## 2018-11-12 PROCEDURE — 97530 THERAPEUTIC ACTIVITIES: CPT

## 2018-11-12 RX ORDER — LEVALBUTEROL 1.25 MG/.5ML
1.25 SOLUTION, CONCENTRATE RESPIRATORY (INHALATION)
Status: DISCONTINUED | OUTPATIENT
Start: 2018-11-12 | End: 2018-11-13 | Stop reason: HOSPADM

## 2018-11-12 RX ORDER — GLIMEPIRIDE 1 MG/1
1 TABLET ORAL
Qty: 30 TABLET | Refills: 0 | Status: SHIPPED | OUTPATIENT
Start: 2018-11-12 | End: 2021-05-19 | Stop reason: ALTCHOICE

## 2018-11-12 RX ORDER — ONDANSETRON 4 MG/1
4 TABLET, ORALLY DISINTEGRATING ORAL EVERY 6 HOURS PRN
Qty: 20 TABLET | Refills: 0 | Status: SHIPPED | OUTPATIENT
Start: 2018-11-12 | End: 2018-12-02

## 2018-11-12 RX ORDER — LEVOFLOXACIN 5 MG/ML
750 INJECTION, SOLUTION INTRAVENOUS
Status: DISCONTINUED | OUTPATIENT
Start: 2018-11-12 | End: 2018-11-13 | Stop reason: HOSPADM

## 2018-11-12 RX ORDER — IPRATROPIUM BROMIDE AND ALBUTEROL SULFATE 2.5; .5 MG/3ML; MG/3ML
3 SOLUTION RESPIRATORY (INHALATION)
Status: DISCONTINUED | OUTPATIENT
Start: 2018-11-12 | End: 2018-11-12

## 2018-11-12 RX ORDER — CEPHALEXIN 500 MG/1
500 CAPSULE ORAL EVERY 12 HOURS SCHEDULED
Qty: 10 CAPSULE | Refills: 0 | Status: SHIPPED | OUTPATIENT
Start: 2018-11-12 | End: 2018-11-13 | Stop reason: HOSPADM

## 2018-11-12 RX ADMIN — INSULIN LISPRO 1 UNITS: 100 INJECTION, SOLUTION INTRAVENOUS; SUBCUTANEOUS at 21:29

## 2018-11-12 RX ADMIN — INSULIN LISPRO 1 UNITS: 100 INJECTION, SOLUTION INTRAVENOUS; SUBCUTANEOUS at 09:33

## 2018-11-12 RX ADMIN — INSULIN LISPRO 2 UNITS: 100 INJECTION, SOLUTION INTRAVENOUS; SUBCUTANEOUS at 16:46

## 2018-11-12 RX ADMIN — HEPARIN SODIUM 5000 UNITS: 5000 INJECTION INTRAVENOUS; SUBCUTANEOUS at 21:26

## 2018-11-12 RX ADMIN — PRAVASTATIN SODIUM 40 MG: 40 TABLET ORAL at 16:49

## 2018-11-12 RX ADMIN — INSULIN LISPRO 1 UNITS: 100 INJECTION, SOLUTION INTRAVENOUS; SUBCUTANEOUS at 12:58

## 2018-11-12 RX ADMIN — LEVOFLOXACIN 750 MG: 5 INJECTION, SOLUTION INTRAVENOUS at 17:48

## 2018-11-12 RX ADMIN — IPRATROPIUM BROMIDE 0.5 MG: 0.5 SOLUTION RESPIRATORY (INHALATION) at 20:19

## 2018-11-12 RX ADMIN — CEFTRIAXONE SODIUM 1000 MG: 10 INJECTION, POWDER, FOR SOLUTION INTRAVENOUS at 09:32

## 2018-11-12 RX ADMIN — HEPARIN SODIUM 5000 UNITS: 5000 INJECTION INTRAVENOUS; SUBCUTANEOUS at 05:38

## 2018-11-12 RX ADMIN — LISINOPRIL 5 MG: 5 TABLET ORAL at 09:33

## 2018-11-12 RX ADMIN — LEVALBUTEROL HYDROCHLORIDE 1.25 MG: 1.25 SOLUTION, CONCENTRATE RESPIRATORY (INHALATION) at 20:19

## 2018-11-12 RX ADMIN — TRAMADOL HYDROCHLORIDE 50 MG: 50 TABLET, FILM COATED ORAL at 21:26

## 2018-11-12 RX ADMIN — ONDANSETRON 4 MG: 2 INJECTION INTRAMUSCULAR; INTRAVENOUS at 08:31

## 2018-11-12 RX ADMIN — CARVEDILOL 12.5 MG: 12.5 TABLET, FILM COATED ORAL at 16:49

## 2018-11-12 RX ADMIN — CARVEDILOL 12.5 MG: 12.5 TABLET, FILM COATED ORAL at 09:32

## 2018-11-12 RX ADMIN — HEPARIN SODIUM 5000 UNITS: 5000 INJECTION INTRAVENOUS; SUBCUTANEOUS at 13:00

## 2018-11-12 NOTE — ASSESSMENT & PLAN NOTE
· Had chest wall discomfort due to trauma from syncope  · Fever on 11/10 at 101 9  · CXR performed showing small left effusion with overlying airspace opacity possibly indicative of pneumonia  This was a new finding from the CXR on 11/8  · Hypoxia present at 87% room air  Supplemental oxygen as needed  · Rocephin started (to cover possible pneumonia vs  UTI)      · Incentive spirometry  · Out of bed / ambulate

## 2018-11-12 NOTE — OCCUPATIONAL THERAPY NOTE
OccupationalTherapy Progress Note     Patient Name: Sen Baxter  JDIGZ'X Date: 11/12/2018  Problem List  Patient Active Problem List   Diagnosis    Fall    Type 2 diabetes mellitus without complication, without long-term current use of insulin ([facility] [address] )    Essential hypertension           11/12/18 1103   Restrictions/Precautions   Weight Bearing Precautions Per Order No   Other Precautions Cognitive; Chair Alarm; Bed Alarm; Fall Risk;Multiple lines;Visual impairment   Lifestyle   Autonomy At baseline, pt was I w/ ADLs, IADLs (reports gets on ladders, moves furniture), and functional mobility/transfers with use of SPC within community, (-) , and reports 3 falls PTA  Reciprocal Relationships Lives alone; Supportive dtr and niece   Service to Others Retired   Intrinsic Gratification Watching sports- gymnastics    Pain Assessment   Pain Assessment No/denies pain   Pain Score No Pain   ADL   Where Assessed Chair   Grooming Assistance 5  Supervision/Setup   Grooming Deficit Setup;Supervision/safety; Increased time to complete   Grooming Comments Light grooming tasks completed with Supervision while standing at sink to wash/dry hands and brush hair  UB Bathing Assistance 5  Supervision/Setup   UB Bathing Deficit Setup;Supervision/safety; Increased time to complete   UB Bathing Comments UB bathing completed with Supervision 2* setup required and increased time complete  LB Bathing Assistance 4  Minimal Assistance   LB Bathing Deficit Setup;Steadying;Verbal cueing;Supervision/safety; Increased time to complete; Buttocks; Perineal area   LB Bathing Comments LB bathing completed with Min A 2* CGA required when standing to wash buttocks/perineal area 2* decreased dynamic standing balance demonstrated  UB Dressing Assistance 5  Supervision/Setup   UB Dressing Deficit Setup;Supervision/safety; Increased time to complete   UB Dressing Comments UB dressing completed with Supervision 2* setup required and increased time to don/[facility] gown  LB Dressing Assistance 4  Minimal Assistance   LB Dressing Deficit Setup;Steadying; Requires assistive device for steadying;Verbal cueing;Supervision/safety; Increased time to complete;Pull up over hips   LB Dressing Comments LB dressing completed with Min A  Pt able to don/doff B/L socks and thread BLEs into underwear with Supervision only, however CGA required when standing to pull underwear over hips  Toileting Assistance  4  Minimal Assistance   Toileting Deficit Setup;Verbal cueing;Supervison/safety; Increased time to complete;Steadying;Clothing management up;Clothing management down   Toileting Comments Toileting tasks completed with Min A 2* CGA required when standing for clothing management up/down  Functional Standing Tolerance   Time 2 mins   Activity Functional mobility, self care tasks   Comments No LOB noted, however overall decreased standing tolerance demonstrated  Pt able to tolerate approx  2 mins of standing activity prior to requesting seated rest break 2* increased fatigue  Bed Mobility   Supine to Sit 5  Supervision   Additional items HOB elevated; Increased time required; Bedrails;Verbal cues   Sit to Supine 4  Minimal assistance   Additional items Assist x 1; Increased time required;Verbal cues;LE management   Additional Comments Pt lying supine at end of session with bed alarm activated  Call bell and phone within reach  All needs met and pt reports no further questions for OT a this time  Transfers   Sit to Stand 4  Minimal assistance   Additional items Assist x 1; Increased time required;Verbal cues   Stand to Sit 4  Minimal assistance   Additional items Assist x 1; Armrests; Increased time required;Verbal cues   Toilet transfer 4  Minimal assistance   Additional items Assist x 1; Increased time required;Verbal cues;Standard toilet  (grab bar use)   Additional Comments Cues for safe technique and hand placement   Functional Mobility   Functional Mobility 4 Minimal assistance   Additional Comments Assist x1   Additional items Rolling walker   Toilet Transfers   Toilet Transfer From Rolling walker   Toilet Transfer Type To and from   Toilet Transfer to Standard toilet   Toilet Transfer Technique Stand pivot; Ambulating   Toilet Transfers Minimal assistance   Toilet Transfers Comments Assist x1   Cognition   Overall Cognitive Status Impaired   Arousal/Participation Alert; Cooperative   Attention Attends with cues to redirect   Orientation Level Oriented X4   Memory Decreased recall of precautions;Decreased recall of recent events;Decreased short term memory   Following Commands Follows one step commands with increased time or repetition   Activity Tolerance   Activity Tolerance Patient limited by fatigue   Medical Staff Made Aware Pt appropriate to be seen per Javier Quintero RN   Assessment   Assessment Pt seen for OT treatment session this AM focusing on functional activity tolerance, bed mobility, ADLs, and functional mobility/transfers  Pt cooperative throughout session, however increased lethargy noted from initial evaluation  Pt lying supine at start of session, requiring Supervision only for supine>sit 2* increased time to complete and cues for technique  Transfers (sit<>stand, RW<>standard toilet) completed with Min A with assist to initiate forward weight shift from surface for sit>stand and assist for controlled descent to surface for stand>sit  Min A required for balance/steadying during functional mobility with overall decreased standing tolerance demonstrated  Pt able to tolerate approx  2 mins of standing activity prior to requesting seated rest break 2* increased fatigue  Toileting tasks completed with Min A 2* CGA required when standing for clothing management up/down  Light grooming tasks completed with Supervision while standing at sink to wash/dry hands and brush hair  ADL routine completed while seated OOB in chair   UB bathing completed with Supervision 2* setup required and increased time complete  LB bathing completed with Min A 2* CGA required when standing to wash buttocks/perineal area 2* decreased dynamic standing balance demonstrated  UB dressing completed with Supervision 2* setup required and increased time to don/doff hospital gown  LB dressing completed with Min A  Pt able to don/doff B/L socks and thread BLEs into underwear with Supervision only, however CGA required when standing to pull underwear over hips  Pt lying supine at end of session with bed alarm activated  Call bell and phone within reach  All needs met and pt reports no further questions for OT a this time  Continue to recommend STR upon D/C  OT to follow pt on caseload  Plan   Treatment Interventions ADL retraining;Functional transfer training;UE strengthening/ROM; Endurance training;Patient/family training;Equipment evaluation/education; Compensatory technique education; Energy conservation; Activityengagement   Goal Expiration Date 11/23/18   Treatment Day 1   OT Frequency 3-5x/wk   Recommendation   OT Discharge Recommendation Short Term Rehab   OT - OK to Discharge Yes  (when medically cleared to rehab)   Barthel Index   Feeding 10   Bathing 0   Grooming Score 5   Dressing Score 5   Bladder Score 10   Bowels Score 10   Toilet Use Score 5   Transfers (Bed/Chair) Score 10   Mobility (Level Surface) Score 0   Stairs Score 0   Barthel Index Score 55   Modified [place] Scale   Modified [place] Scale 4       Nancy Prakash OTR/L

## 2018-11-12 NOTE — ASSESSMENT & PLAN NOTE
· Syncope prior to arrival  · Echo and carotid ultrasound good  · Will be discharged to rehab when medically stable

## 2018-11-12 NOTE — SOCIAL WORK
Per covering weekend , Patient accepted at [facility] and daughter Becca Vieira in agreement with this plan and able to transport to rehab when medically cleared       Nusrat Sherman RN  11/12/18  8:34 AM

## 2018-11-12 NOTE — PROGRESS NOTES
[facility]  Progress Note - Fady Hawk 5/2/1923, 80 y o  female MRN: [id number]    Unit/Bed#: E4 -01 Encounter: [id number]    Primary Care Provider: Pacheco Russell DO   Date and time admitted to hospital: 11/8/2018 12:52 PM        * Syncope and collapseresolved as of 11/12/2018   Assessment & Plan    · Syncope prior to arrival  · Echo and carotid ultrasound good  · Will be discharged to rehab when medically stable     [address] [place] [address]    · Multiple falls at home  · Evaluated by PT who recommends STR  · Will continue care at rehab       Essential hypertension   Assessment & Plan    · On Coreg and Lisinopril per home regimen  · Orthostatic VS normal     Type 2 diabetes mellitus without complication, without long-term current use of insulin [facility])   Assessment & Plan    Lab Results   Component Value Date    HGBA1C 9 7 (H) 11/10/2018       Recent Labs      11/11/18   1111  11/11/18   1630  11/11/18   2125  11/12/18   0734   POCGLU  304*  243*  255*  176*       Blood Sugar Average: Last 72 hrs:    · (P) 270 [id number]   · On home regimen of glimepiride 2 mg daily  · A1c 9 7     Pneumonia due to infectious organism   Assessment & Plan    · Had chest wall discomfort due to trauma from syncope  · Fever on 11/10 at 101 9  · CXR performed showing small left effusion with overlying airspace opacity possibly indicative of pneumonia  This was a new finding from the CXR on 11/8  · Hypoxia present at 87% room air  Supplemental oxygen as needed  · Rocephin started (to cover possible pneumonia vs  UTI)      · Incentive spirometry  · Out of bed / ambulate       Feverresolved as of 11/12/2018   Assessment & Plan    · Had fever, likely acute cystitis  · On empiric Rocephin  · Will change to Keflex for discharge     Chest painresolved as of 11/12/2018   Assessment & Plan    · Non-cardiac  · Due to chest wall trauma  · On prn tramadol           VTE Pharmacologic Prophylaxis: Pharmacologic: Heparin  Mechanical VTE Prophylaxis in Place: Yes    Patient Centered Rounds: I have performed bedside rounds with nursing staff today  Discussions with Specialists or Other Care Team Provider: Attending    Education and Discussions with Family / Patient: plan of care with patient and daughter    Time Spent for Care: 30 minutes  More than 50% of total time spent on counseling and coordination of care as described above  Current Length of Stay: 3 day(s)    Current Patient Status: Inpatient   Certification Statement: The patient will continue to require additional inpatient hospital stay due to Hypoxia / possible pneumonia    Discharge Plan: To rehab, [facility], when medically stable    Code Status: Level 3 - DNAR and DNI      Subjective:   Patient had nausea this morning, which she chronically has every morning, but worse today  She denies feeling short of breath or cough  Has residual chest wall discomfort from her fall  Objective:     Vitals:   Temp (24hrs), Av 1 °F (37 3 °C), Min:98 8 °F (37 1 °C), Max:99 5 °F (37 5 °C)    Temp:  [98 8 °F (37 1 °C)-99 5 °F (37 5 °C)] 99 °F (37 2 °C)  HR:  [63-80] 80  Resp:  [18] 18  BP: (141-188)/(61-76) 188/76  SpO2:  [87 %-99 %] 91 %  Body mass index is 26 24 kg/m²  Input and Output Summary (last 24 hours): Intake/Output Summary (Last 24 hours) at 18 1147  Last data filed at 18 0949   Gross per 24 hour   Intake              350 ml   Output              700 ml   Net             -350 ml       Physical Exam:     Physical Exam   Constitutional: She is oriented to person, place, and time  She appears well-developed and well-nourished  No distress  Eyes: Conjunctivae are normal  No scleral icterus  Cardiovascular: Normal rate, regular rhythm, normal heart sounds and intact distal pulses  No murmur heard  Pulmonary/Chest: Effort normal and breath sounds normal  No respiratory distress  She has no wheezes   She has no rales    Abdominal: Soft  Bowel sounds are normal  She exhibits no distension  There is no tenderness  Musculoskeletal: Normal range of motion  She exhibits no edema or tenderness  Chest wall tenderness over sternum  Neurological: She is alert and oriented to person, place, and time  More somnolent today  Skin: Skin is warm and dry  She is not diaphoretic  Psychiatric: She has a normal mood and affect  Her behavior is normal        Additional Data:     Labs:      Results from last 7 days  Lab Units 11/12/18  0629   WBC Thousand/uL 9 90   HEMOGLOBIN g/dL 12 3   HEMATOCRIT % 39 4   PLATELETS Thousands/uL 335   NEUTROS PCT % 66   LYMPHS PCT % 17   MONOS PCT % 14*   EOS PCT % 3       Results from last 7 days  Lab Units 11/12/18  0629  11/08/18  1329   POTASSIUM mmol/L 4 4  < > 5 7*   CHLORIDE mmol/L 101  < > 99*   CO2 mmol/L 29  < > 32   BUN mg/dL 22  < > 19   CREATININE mg/dL 1 20  < > 1 21   ANION GAP mmol/L 6  < > 5   CALCIUM mg/dL 9 0  < > 9 1   ALBUMIN g/dL  --   --  3 1*   TOTAL BILIRUBIN mg/dL  --   --  0 52   ALK PHOS U/L  --   --  134*   ALT U/L  --   --  18   AST U/L  --   --  48*   < > = values in this interval not displayed  Results from last 7 days  Lab Units 11/08/18  1428   INR  1 02       Results from last 7 days  Lab Units 11/12/18  0734 11/11/18  2125 11/11/18  1630 11/11/18  1111 11/11/18  0815 11/10/18  2117 11/10/18  1628 11/10/18  1137 11/10/18  0803 11/09/18  2050 11/09/18  1728   POC GLUCOSE mg/dl 176* 255* 243* 304* 154* 201* 208* 220* 164* 249* 197*       Results from last 7 days  Lab Units 11/10/18  0533   HEMOGLOBIN A1C % 9 7*               * I Have Reviewed All Lab Data Listed Above  * Additional Pertinent Lab Tests Reviewed:  [facility]    Imaging:    Imaging Reports Reviewed Today Include: Echocardiogram / CXR (11/10) / Carotid ultrasound  Imaging Personally Reviewed by Myself Includes:  None    Recent Cultures (last 7 days): Results from last 7 days  Lab Units 11/10/18  1837 11/10/18  1821   BLOOD CULTURE  No Growth at 24 hrs  No Growth at 24 hrs  Last 24 Hours Medication List:     Current Facility-Administered Medications:  acetaminophen 650 mg Oral Q6H PRN Titi Prechtel, DO    carvedilol 12 5 mg Oral BID With Meals Titi Prechtel, DO    cefTRIAXone 1,000 mg Intravenous Q24H Titi Prechtel, DO Last Rate: 1,000 mg (11/12/18 0932)   heparin (porcine) 5,000 Units Subcutaneous Q8H [address] Titi Prechtel, DO    insulin lispro 1-5 Units Subcutaneous TID AC SAMIRA Bueno    insulin lispro 1-5 Units Subcutaneous HS SAMIRA Bueno    lisinopril 5 mg Oral Daily Titi Prechtel, DO    ondansetron 4 mg Intravenous Q4H PRN Titi Prechtel, DO    pravastatin 40 mg Oral Daily With [employer], DO    traMADol 50 mg Oral Q6H PRN Brad Taylor DO         Today, Patient Was Seen By: SAMIRA Bueno    ** Please Note: Dictation voice to text software may have been used in the creation of this document   **

## 2018-11-12 NOTE — PLAN OF CARE
Problem: OCCUPATIONAL THERAPY ADULT  Goal: Performs self-care activities at highest level of function for planned discharge setting  See evaluation for individualized goals  Treatment Interventions: ADL retraining, Functional transfer training, Endurance training, UE strengthening/ROM, Patient/family training, Equipment evaluation/education, Compensatory technique education, Energy conservation, Activityengagement          See flowsheet documentation for full assessment, interventions and recommendations  Outcome: Progressing  Limitation: Decreased ADL status, Decreased Safe judgement during ADL, Decreased UE strength, Decreased endurance, Decreased self-care trans, Decreased high-level ADLs  Prognosis: Good  Assessment: Pt seen for OT treatment session this AM focusing on functional activity tolerance, bed mobility, ADLs, and functional mobility/transfers  Pt cooperative throughout session, however increased lethargy noted from initial evaluation  Pt lying supine at start of session, requiring Supervision only for supine>sit 2* increased time to complete and cues for technique  Transfers (sit<>stand, RW<>standard toilet) completed with Min A with assist to initiate forward weight shift from surface for sit>stand and assist for controlled descent to surface for stand>sit  Min A required for balance/steadying during functional mobility with overall decreased standing tolerance demonstrated  Pt able to tolerate approx  2 mins of standing activity prior to requesting seated rest break 2* increased fatigue  Toileting tasks completed with Min A 2* CGA required when standing for clothing management up/down  Light grooming tasks completed with Supervision while standing at sink to wash/dry hands and brush hair  ADL routine completed while seated OOB in chair  UB bathing completed with Supervision 2* setup required and increased time complete   LB bathing completed with Min A 2* CGA required when standing to wash buttocks/perineal area 2* decreased dynamic standing balance demonstrated  UB dressing completed with Supervision 2* setup required and increased time to don/[facility] gown  LB dressing completed with Min A  Pt able to don/doff B/L socks and thread BLEs into underwear with Supervision only, however CGA required when standing to pull underwear over hips  Pt lying supine at end of session with bed alarm activated  Call bell and phone within reach  All needs met and pt reports no further questions for OT a this time  Continue to recommend STR upon D/C  OT to follow pt on caseload        OT Discharge Recommendation: Short Term Rehab  OT - OK to Discharge: Yes (when medically cleared to rehab)

## 2018-11-12 NOTE — DISCHARGE SUMMARY
[facility]  Discharge- Gwendtayler Reina 5/2/1923, 80 y o  female MRN: [id number]    Unit/Bed#: E4 -01 Encounter: [id number]    Primary Care Provider: Farida Almeida DO   Date and time admitted to hospital: 11/8/2018 12:52 PM        * Syncope and collapse   Assessment & Plan    · Syncope prior to arrival  · Echo and carotid ultrasound good  · Will be discharged today to rehab     [address] [place] [address]    · Multiple falls at home  · Will continue care at rehab       Essential hypertension   Assessment & Plan    · On Coreg and Lisinopril per home regimen  · Orthostatic VS normal     Type 2 diabetes mellitus without complication, without long-term current use of insulin [facility])   Assessment & Plan    Lab Results   Component Value Date    HGBA1C 9 7 (H) 11/10/2018       Recent Labs      11/11/18   1111  11/11/18   1630  11/11/18   2125  11/12/18   0734   POCGLU  304*  243*  255*  176*       Blood Sugar Average: Last 72 hrs:    · (P) 858 [id number] On home regimen of glimepiride 2 mg daily  · A1c 9 7  · Would consider changing glimepiride on discharge as patient reports she has not been eating much and has lost 40# in the last two years  · FSBS ac / hs with SSI coverage     Fever   Assessment & Plan    · Had fever, likely acute cystitis  · On empiric Rocephin  · Will change to Keflex for discharge     Chest pain   Assessment & Plan    · Non-cardiac  · Due to chest wall trauma  · On prn tramadol         Pneumonia  Assessment & Plan  · With fever, patient also found to have hypoxia  · CXR performed which showed questionable pneumonia  · Antibiotic therapy changed to levaquin and nebulizer treatments ordered  · Incentive spirometry ordered  · PA / lateral film repeated and showed improved chest and resolution of new findings  · O2 saturation improved  Will continue Levaquin antibiotic therapy to cover both pneumonia vs  UTI upon discharge      Discharging Physician / Practitioner: Carolyne Steel SAMIRA Henriquez  PCP: Juju Bella DO  Admission Date:   Admission Orders     Ordered        11/09/18 1330  Inpatient Admission  Once         11/08/18 1554  Place in Observation (expected length of stay for this patient is less than two midnights)  Once             Discharge Date: 11/12/18    Resolved Problems  Date Reviewed: 11/12/2018    None          Consultations During Hospital Stay:  · None    Procedures Performed:     · CXR:  No acute cardiopulmonary disease  · CT of brain:  Moderate chronic microangiopathic change with no hemorrhage or acute ischemia identified  1 cm left anterior parafalcine mass appears extra-axial consistent with meningioma  No significant mass effect upon or edema within the brain parenchyma  · CT cervical spine:  Osteopenia and degenerative change of the cervical spine with no acute fracture identified  Thyroid gland is enlarged and heterogeneous  · CT recon only thoraco lumbar:  Diffuse idiopathic skeletal hyperostosis of the thoracic and lumbar spine without evidence of acute trauma  · CT chest, abdomen, pelvis without contrast:  No acute traumatic injury of the chest, abdomen, or pelvis status post fall  Gallbladder is distended with faint hyperdensity layering posteriorly suggesting sludge or tiny calcified stents  Diffuse osteopenia and degenerative endplate hypertrophic changes of the thoracic and lumbar spine  No acute osseous abnormality  Diffuse fecal stasis  Colonic diverticulosis  · Carotid ultrasound:  Less than 50% stenosis in the right internal carotid artery  Less than 50% stenosis in the left internal carotid artery  · Chest x-ray:  Small left effusion with overlying airspace opacity, new when compared to November 8, 2018  ·  Echocardiogram:  LEFT VENTRICLE: Size was normal  Systolic function was normal  Ejection fraction was estimated to be 65 %  There were no regional wall motion abnormalities  There was mild concentric hypertrophy   No evidence of apical thrombus  DOPPLER:   Doppler parameters were consistent with abnormal left ventricular relaxation (grade 1 diastolic dysfunction)  RIGHT VENTRICLE: The size was normal  Systolic function was normal  Wall thickness was normal      LEFT ATRIUM: Size was normal      RIGHT ATRIUM: Size was normal      MITRAL VALVE: Valve structure was normal  There was normal leaflet separation  DOPPLER: The transmitral velocity was within the normal range  There was no evidence for stenosis  There was mild regurgitation  AORTIC VALVE: The valve was trileaflet  Leaflets exhibited normal thickness, mild calcification, and normal cuspal separation  DOPPLER: Transaortic velocity was within the normal range  There was no evidence for stenosis  There was mild   regurgitation  TRICUSPID VALVE: The valve structure was normal  There was normal leaflet separation  DOPPLER: The transtricuspid velocity was within the normal range  There was no evidence for stenosis  There was no significant regurgitation  Estimated   peak PA pressure was at least 40 mmHg  PULMONIC VALVE: Leaflets exhibited normal thickness, no calcification, and normal cuspal separation  DOPPLER: The transpulmonic velocity was within the normal range  There was no significant regurgitation  PERICARDIUM: There was no pericardial effusion  The pericardium was normal in appearance  AORTA: The root exhibited normal size  SYSTEMIC VEINS: IVC: The inferior vena cava was normal in size  · CXR, 11/12:  Improved appearance of the chest     Significant Findings / Test Results:     · UA with small leukocytes, 10-20 WBC, occasional bacteria  · T-max 101 9    Incidental Findings:   · None     Test Results Pending at Discharge (will require follow up):    · None     Outpatient Tests Requested:  · None    Complications:  None    Reason for Admission: Syncope    Hospital Course:     Yoselyn Caban is a 80 y o  female patient who originally presented to the [facility] on 11/8/2018 due to syncope  Patient states that the morning of her admission, she was sitting on the toilet and fell forward  The neck is recollection that the patient had was that she was slumped over the edge of the bathtub  She did not recall what happened  She confirmed that she had loss of consciousness  Patient states that due to her syncope, she had some chest wall discomfort from apparently striking it on the bath tub  Patient had no preceding symptoms such as dizziness, chest pain, or palpitations  Patient states that she does have chronic dizziness but has not experienced syncope in the past   Her history includes hypertension and diabetes mellitus type 2  Pertinent to this admission is that the patient has had frequent falls recently at home  She does live alone  She has a daughter nearby who has suggested the patient should undergo some short-term memory have due to her frequent falls  The patient's workup in the emergency room included labs which showed a mild leukocytosis, hyperkalemia at 5 7, and hyperglycemia at 3:27 a m  Naty Phillip She had a negative troponin  Her initial urinalysis showed some spilling of glucose and protein, small leukocyte  The patient underwent chest x-ray and CT of the brain, cervical spine, thoracolumbar spine, chest, abdomen and pelvis  This CT of the brain showed findings consistent with meningioma, CT of the C-spine had an incidental finding of an enlarged thyroid gland, and the abdominal CT had an incidental finding of some gallbladder distention with possible sludge  There were no acute findings on the imaging studies  The patient was admitted for further evaluation and to plan for rehabilitation post discharge  Due to the patient's syncope, further workup was performed with an echocardiogram and carotid ultrasound  Both of these studies were essentially unremarkable and the findings are noted above    The patient did develop a fever while here with a max temperature of 101 9°  A repeat urinalysis and chest x-ray were performed at that time  The urinalysis showed small leukocytes and 10-20 white blood cells with occasional bacteria  Empiric Rocephin was initiated at that time  The patient was also found to have oxygen saturations in the 80s  She denied any cough or shortness of breath  The chest x-ray revealed a small left effusion with the possibility of a left basilar pneumonia  The patient continued to have intermittent lower oxygen saturation readings  The antibiotic was then changed to Levaquin and nebulizer treatments were added as well as incentive spirometer  A PA and lateral film was obtained of the chest which showed improvement and resolution of previous findings  The patient's fever and hypoxia had resolved  The patient felt well enough to be discharged to the rehab facility today  These arrangements were made with case management  The daughter was transporting the patient to the rehab facility this afternoon  We will continue her Levaquin and nebulizer treatments upon discharge  Please see above list of diagnoses and related plan for additional information  Condition at Discharge: stable     Discharge Day Visit / Exam:     Subjective:  Has nausea currently which she has every morning "for thirty years"  Denies shortness of breath or cough  Endorses that she is ready to be discharged to rehab  Vitals: Blood Pressure: (!) 188/76 (11/12/18 0800)  Pulse: 80 (11/12/18 0800)  Temperature: 99 °F (37 2 °C) (11/12/18 0800)  Temp Source: Tympanic (11/12/18 0800)  Respirations: 18 (11/12/18 0800)  Height: 5' 3" (160 cm) (11/08/18 1744)  Weight - Scale: 67 2 kg (148 lb 2 4 oz) (11/08/18 1317)  SpO2: 93 % (11/12/18 0842)  Exam:   Physical Exam   Constitutional: She is oriented to person, place, and time  She appears well-developed and well-nourished  No distress  Eyes: Conjunctivae are normal  No scleral icterus     Cardiovascular: Normal rate, regular rhythm, normal heart sounds and intact distal pulses  No murmur heard  Pulmonary/Chest: Effort normal and breath sounds normal  No respiratory distress  She has no wheezes  She has no rales  Abdominal: Soft  Bowel sounds are normal  She exhibits no distension  Musculoskeletal: Normal range of motion  She exhibits no edema or tenderness  Neurological: She is alert and oriented to person, place, and time  Skin: Skin is warm and dry  She is not diaphoretic  Psychiatric: She has a normal mood and affect  Her behavior is normal      Discussion with Family: plan of care with patient and daughter    Discharge instructions/Information to patient and family:   See after visit summary for information provided to patient and family  Provisions for Follow-Up Care:  See after visit summary for information related to follow-up care and any pertinent home health orders  Disposition:     Pablo Vieira Parveen at [address] to [facility]:   · Tha Robert 41 to reach physician and no message left  Planned Readmission: None     Discharge Statement:  I spent 60 minutes discharging the patient  This time was spent on the day of discharge  I had direct contact with the patient on the day of discharge  Greater than 50% of the total time was spent examining patient, answering all patient questions, arranging and discussing plan of care with patient as well as directly providing post-discharge instructions  Additional time then spent on discharge activities  Discharge Medications:  See after visit summary for reconciled discharge medications provided to patient and family  Addendum: A Tiger Text was sent to [employer] regarding this patient    ** Please Note: This note has been constructed using a voice recognition system **

## 2018-11-12 NOTE — ASSESSMENT & PLAN NOTE
Lab Results   Component Value Date    HGBA1C 9 7 (H) 11/10/2018       Recent Labs      11/11/18   1111  11/11/18   1630  11/11/18   2125  11/12/18   0734   POCGLU  304*  243*  255*  176*       Blood Sugar Average: Last 72 hrs:    · (P) 588 [id number]   · On home regimen of glimepiride 2 mg daily  · A1c 9 7

## 2018-11-13 VITALS
WEIGHT: 148.15 LBS | HEIGHT: 63 IN | TEMPERATURE: 96.7 F | OXYGEN SATURATION: 91 % | DIASTOLIC BLOOD PRESSURE: 95 MMHG | HEART RATE: 66 BPM | SYSTOLIC BLOOD PRESSURE: 151 MMHG | RESPIRATION RATE: 18 BRPM | BODY MASS INDEX: 26.25 KG/M2

## 2018-11-13 LAB
ATRIAL RATE: 72 BPM
GLUCOSE SERPL-MCNC: 159 MG/DL (ref 65–140)
GLUCOSE SERPL-MCNC: 217 MG/DL (ref 65–140)
P AXIS: 95 DEGREES
PR INTERVAL: 192 MS
QRS AXIS: -40 DEGREES
QRSD INTERVAL: 96 MS
QT INTERVAL: 388 MS
QTC INTERVAL: 424 MS
T WAVE AXIS: 57 DEGREES
VENTRICULAR RATE: 72 BPM

## 2018-11-13 PROCEDURE — 94640 AIRWAY INHALATION TREATMENT: CPT

## 2018-11-13 PROCEDURE — 94760 N-INVAS EAR/PLS OXIMETRY 1: CPT

## 2018-11-13 PROCEDURE — 97116 GAIT TRAINING THERAPY: CPT

## 2018-11-13 PROCEDURE — 93010 ELECTROCARDIOGRAM REPORT: CPT | Performed by: INTERNAL MEDICINE

## 2018-11-13 PROCEDURE — 97110 THERAPEUTIC EXERCISES: CPT

## 2018-11-13 PROCEDURE — 82948 REAGENT STRIP/BLOOD GLUCOSE: CPT

## 2018-11-13 RX ORDER — TRAMADOL HYDROCHLORIDE 50 MG/1
50 TABLET ORAL EVERY 12 HOURS PRN
Qty: 6 TABLET | Refills: 0 | Status: SHIPPED | OUTPATIENT
Start: 2018-11-13 | End: 2018-11-23

## 2018-11-13 RX ORDER — LEVOFLOXACIN 750 MG/1
750 TABLET ORAL EVERY OTHER DAY
Qty: 2 TABLET | Refills: 0 | Status: SHIPPED | OUTPATIENT
Start: 2018-11-14 | End: 2018-11-18

## 2018-11-13 RX ORDER — LEVALBUTEROL 1.25 MG/.5ML
1.25 SOLUTION, CONCENTRATE RESPIRATORY (INHALATION) EVERY 6 HOURS PRN
Qty: 1 EACH | Refills: 0 | Status: CANCELLED | OUTPATIENT
Start: 2018-11-13

## 2018-11-13 RX ORDER — LEVOFLOXACIN 750 MG/1
750 TABLET ORAL EVERY OTHER DAY
Qty: 2 TABLET | Refills: 0 | Status: CANCELLED | OUTPATIENT
Start: 2018-11-14 | End: 2018-11-18

## 2018-11-13 RX ORDER — LEVALBUTEROL 1.25 MG/.5ML
1.25 SOLUTION, CONCENTRATE RESPIRATORY (INHALATION)
Qty: 1 EACH | Refills: 0
Start: 2018-11-13 | End: 2018-11-16

## 2018-11-13 RX ADMIN — CARVEDILOL 12.5 MG: 12.5 TABLET, FILM COATED ORAL at 08:24

## 2018-11-13 RX ADMIN — INSULIN LISPRO 1 UNITS: 100 INJECTION, SOLUTION INTRAVENOUS; SUBCUTANEOUS at 13:14

## 2018-11-13 RX ADMIN — LEVALBUTEROL HYDROCHLORIDE 1.25 MG: 1.25 SOLUTION, CONCENTRATE RESPIRATORY (INHALATION) at 08:00

## 2018-11-13 RX ADMIN — IPRATROPIUM BROMIDE 0.5 MG: 0.5 SOLUTION RESPIRATORY (INHALATION) at 08:00

## 2018-11-13 RX ADMIN — HEPARIN SODIUM 5000 UNITS: 5000 INJECTION INTRAVENOUS; SUBCUTANEOUS at 05:48

## 2018-11-13 RX ADMIN — ONDANSETRON 4 MG: 2 INJECTION INTRAMUSCULAR; INTRAVENOUS at 09:54

## 2018-11-13 RX ADMIN — LISINOPRIL 5 MG: 5 TABLET ORAL at 08:24

## 2018-11-13 NOTE — ASSESSMENT & PLAN NOTE
· Had fever, likely acute cystitis  · On empiric Rocephin  · Continue antibiotic therapy with levaquin upon discharge (to cover cystitis vs  Pneumonia)

## 2018-11-13 NOTE — SOCIAL WORK
Per PA Padma Mitchell, pt is medically stable for discharge to [facility] this afternoon  PA confirmed with dtr of a  time of 2pm, dtr will transport to facility  Cm made RN, US, and pt aware of this time  CM inputted report and fax numbers into chart  Cm contacted [facility] and also made them aware of transport time  No further cm needs identified

## 2018-11-13 NOTE — ASSESSMENT & PLAN NOTE
· Had chest wall discomfort due to trauma from syncope  · Fever on 11/10 at 101 9, now afebrile  · CXR performed showing small left effusion with overlying airspace opacity possibly indicative of pneumonia  This was a new finding from the CXR on 11/8  CXR from 11/12 improved and with findings resolved  · Hypoxia resolved    · Antibiotic changed to Levaquin  · Nebulizers continue x 3 days post-discharge  · Incentive spirometry  · Out of bed / ambulate

## 2018-11-13 NOTE — ASSESSMENT & PLAN NOTE
Lab Results   Component Value Date    HGBA1C 9 7 (H) 11/10/2018       Recent Labs      11/12/18   0734  11/12/18   1617  11/12/18   2125  11/13/18   0804   POCGLU  176*  254*  198*  159*       Blood Sugar Average: Last 72 hrs:    · (P) 126 [id number]   · On home regimen of glimepiride 2 mg daily  · A1c 9 7

## 2018-11-13 NOTE — PHYSICAL THERAPY NOTE
Physical Therapy Treatment Note     11/13/18 1132   Pain Assessment   Pain Assessment 0-10   Pain Score 4   Pain Type Acute pain   Pain Location Knee   Pain Orientation Left   Pain Descriptors Aching   Pain Frequency Constant/continuous   Hospital Pain Intervention(s) Repositioned; Ambulation/increased activity; Emotional support; Rest   Response to Interventions tolerated   Restrictions/Precautions   Other Precautions Cognitive; Chair Alarm;Pain; Fall Risk   General   Chart Reviewed Yes   Response to Previous Treatment Patient with no complaints from previous session  Family/Caregiver Present No   Cognition   Overall Cognitive Status Impaired   Arousal/Participation Alert; Cooperative   Attention Attends with cues to redirect   Orientation Level Oriented X4   Memory Decreased recall of precautions   Following Commands Follows one step commands without difficulty   Subjective   Subjective Pt in bed   " I haven't eaten breakfast yet "     Bed Mobility   Supine to Sit 5  Supervision   Additional items Assist x 1;HOB elevated; Increased time required; Bedrails   Transfers   Sit to Stand 4  Minimal assistance   Additional items Assist x 1; Increased time required; Bedrails;Verbal cues   Stand to Sit 4  Minimal assistance   Additional items Assist x 1; Armrests; Increased time required;Verbal cues   Ambulation/Elevation   Gait pattern Inconsistent tonja; Excessively slow; Short stride  (lateral sway)   Gait Assistance 4  Minimal assist   Additional items Assist x 1;Verbal cues   Assistive Device Rolling walker   Distance 60'x1   Balance   Static Sitting Fair +   Dynamic Sitting Fair   Static Standing Fair   Dynamic Standing Poor +   Ambulatory Poor +   Endurance Deficit   Endurance Deficit Yes   Endurance Deficit Description fatigue   Activity Tolerance   Activity Tolerance Patient limited by fatigue   Exercises   Quad Sets Supine;10 reps;AROM; Bilateral   Heelslides Supine;10 reps;AROM; Bilateral   Hip Flexion Supine;10 reps;AROM; Bilateral   Hip Abduction Supine;10 reps;AROM; Bilateral   Hip Adduction Supine;10 reps;AROM; Bilateral   Knee AROM Short Arc Quad Supine;10 reps;AROM; Bilateral   Ankle Pumps Supine;10 reps;AROM; Bilateral   Assessment   Prognosis Good   Problem List Decreased strength;Decreased endurance; Impaired balance;Decreased mobility; Decreased cognition;Decreased safety awareness; Impaired vision   Assessment Pt in bed upon arrival   Pt performed supine b/l le arom exercises x 10 reps  Pt performs supine to sit with supervision with head of bed elevated  Pt performs transfers sit to stand with min assist x1 and ambulates with Rw and min assist due to decreased balance, safety, activity tolerance and mobility  Pt only able to ambulate 60' this session due to increased fatigue  PT remained seated out of bed in chair with breakfast in front of pt and chair alarm activated  PT will continue to benefit from skilled inpt PT and rehab inorder to maximize functional outcomes, mobility and independence for eventual safe d/c to home alone  Goals   Patient Goals to go home  STG Expiration Date 11/19/18   Treatment Day 1   Plan   Treatment/Interventions Functional transfer training;LE strengthening/ROM; Elevations; Therapeutic exercise; Endurance training;Patient/family training;Equipment eval/education; Bed mobility;Gait training;Spoke to nursing   Progress Slow progress, decreased activity tolerance   PT Frequency (4-5x/ week)   Recommendation   Recommendation Short-term skilled PT   PT - OK to Discharge Yes  (to rehab, no to home alone)        11/13/18 1132   Pain Assessment   Pain Assessment 0-10   Pain Score 4   Pain Type Acute pain   Pain Location Knee   Pain Orientation Left   Pain Descriptors Aching   Pain Frequency Constant/continuous   Hospital Pain Intervention(s) Repositioned; Ambulation/increased activity; Emotional support; Rest   Response to Interventions tolerated   Restrictions/Precautions   Other Precautions Cognitive; Chair Alarm;Pain; Fall Risk   General   Chart Reviewed Yes   Response to Previous Treatment Patient with no complaints from previous session  Family/Caregiver Present No   Cognition   Overall Cognitive Status Impaired   Arousal/Participation Alert; Cooperative   Attention Attends with cues to redirect   Orientation Level Oriented X4   Memory Decreased recall of precautions   Following Commands Follows one step commands without difficulty   Subjective   Subjective Pt in bed   " I haven't eaten breakfast yet "     Bed Mobility   Supine to Sit 5  Supervision   Additional items Assist x 1;HOB elevated; Increased time required; Bedrails   Transfers   Sit to Stand 4  Minimal assistance   Additional items Assist x 1; Increased time required; Bedrails;Verbal cues   Stand to Sit 4  Minimal assistance   Additional items Assist x 1; Armrests; Increased time required;Verbal cues   Ambulation/Elevation   Gait pattern Inconsistent tonja; Excessively slow; Short stride  (lateral sway)   Gait Assistance 4  Minimal assist   Additional items Assist x 1;Verbal cues   Assistive Device Rolling walker   Distance 60'x1   Balance   Static Sitting Fair +   Dynamic Sitting Fair   Static Standing Fair   Dynamic Standing Poor +   Ambulatory Poor +   Endurance Deficit   Endurance Deficit Yes   Endurance Deficit Description fatigue   Activity Tolerance   Activity Tolerance Patient limited by fatigue   Exercises   Quad Sets Supine;10 reps;AROM; Bilateral   Heelslides Supine;10 reps;AROM; Bilateral   Hip Flexion Supine;10 reps;AROM; Bilateral   Hip Abduction Supine;10 reps;AROM; Bilateral   Hip Adduction Supine;10 reps;AROM; Bilateral   Knee AROM Short Arc Quad Supine;10 reps;AROM; Bilateral   Ankle Pumps Supine;10 reps;AROM; Bilateral   Assessment   Prognosis Good   Problem List Decreased strength;Decreased endurance; Impaired balance;Decreased mobility; Decreased cognition;Decreased safety awareness; Impaired vision   Assessment Pt in bed upon arrival   Pt performed supine b/l le arom exercises x 10 reps  Pt performs supine to sit with supervision with head of bed elevated  Pt performs transfers sit to stand with min assist x1 and ambulates with Rw and min assist due to decreased balance, safety, activity tolerance and mobility  Pt only able to ambulate 60' this session due to increased fatigue  PT remained seated out of bed in chair with breakfast in front of pt and chair alarm activated  PT will continue to benefit from skilled inpt PT and rehab inorder to maximize functional outcomes, mobility and independence for eventual safe d/c to home alone  Goals   Patient Goals to go home  STG Expiration Date 11/19/18   Treatment Day 1   Plan   Treatment/Interventions Functional transfer training;LE strengthening/ROM; Elevations; Therapeutic exercise; Endurance training;Patient/family training;Equipment eval/education; Bed mobility;Gait training;Spoke to nursing   Progress Slow progress, decreased activity tolerance   PT Frequency (4-5x/ week)   Recommendation   Recommendation Short-term skilled PT   PT - OK to Discharge Yes  (to rehab, no to home alone)         Calderon Kemp, PTA

## 2018-11-13 NOTE — PLAN OF CARE
Problem: PHYSICAL THERAPY ADULT  Goal: Performs mobility at highest level of function for planned discharge setting  See evaluation for individualized goals  Treatment/Interventions: Functional transfer training, LE strengthening/ROM, Elevations, Therapeutic exercise, Endurance training, Patient/family training, Equipment eval/education, Bed mobility, Gait training, Compensatory technique education, Continued evaluation, Spoke to nursing, OT, Family, Spoke to case management  Equipment Recommended: Ana Wadsworth ([email or web address])       See flowsheet documentation for full assessment, interventions and recommendations  Outcome: Progressing  Prognosis: Good  Problem List: Decreased strength, Decreased endurance, Impaired balance, Decreased mobility, Decreased cognition, Decreased safety awareness, Impaired vision  Assessment: Pt in bed upon arrival   Pt performed supine b/l le arom exercises x 10 reps  Pt performs supine to sit with supervision with head of bed elevated  Pt performs transfers sit to stand with min assist x1 and ambulates with Rw and min assist due to decreased balance, safety, activity tolerance and mobility  Pt only able to ambulate 60' this session due to increased fatigue  PT remained seated out of bed in chair with breakfast in front of pt and chair alarm activated  PT will continue to benefit from skilled inpt PT and rehab inorder to maximize functional outcomes, mobility and independence for eventual safe d/c to home alone  Barriers to Discharge: Decreased caregiver support  Barriers to Discharge Comments: lives alone  Recommendation: Short-term skilled PT     PT - OK to Discharge: Yes (to rehab, no to home alone)    See flowsheet documentation for full assessment

## 2018-11-16 LAB
BACTERIA BLD CULT: NORMAL
BACTERIA BLD CULT: NORMAL

## 2018-11-17 LAB — GLUCOSE SERPL-MCNC: 71 MG/DL (ref 65–140)

## 2018-12-02 ENCOUNTER — APPOINTMENT (EMERGENCY)
Dept: RADIOLOGY | Facility: HOSPITAL | Age: 83
End: 2018-12-02
Payer: MEDICARE

## 2018-12-02 ENCOUNTER — HOSPITAL ENCOUNTER (EMERGENCY)
Facility: HOSPITAL | Age: 83
Discharge: HOME | End: 2018-12-02
Attending: EMERGENCY MEDICINE
Payer: MEDICARE

## 2018-12-02 VITALS
RESPIRATION RATE: 18 BRPM | DIASTOLIC BLOOD PRESSURE: 78 MMHG | TEMPERATURE: 100 F | OXYGEN SATURATION: 97 % | HEART RATE: 70 BPM | SYSTOLIC BLOOD PRESSURE: 175 MMHG

## 2018-12-02 DIAGNOSIS — M79.10 MYALGIA: ICD-10-CM

## 2018-12-02 DIAGNOSIS — R53.83 FATIGUE: Primary | ICD-10-CM

## 2018-12-02 LAB
ALBUMIN SERPL BCP-MCNC: 3.1 G/DL (ref 3.5–5)
ALP SERPL-CCNC: 190 U/L (ref 46–116)
ALT SERPL W P-5'-P-CCNC: 15 U/L (ref 12–78)
ANION GAP SERPL CALCULATED.3IONS-SCNC: 10 MMOL/L (ref 4–13)
AST SERPL W P-5'-P-CCNC: 16 U/L (ref 5–45)
ATRIAL RATE: 66 BPM
BACTERIA UR QL AUTO: ABNORMAL /HPF
BASOPHILS # BLD AUTO: 0.04 THOUSANDS/ΜL (ref 0–0.1)
BASOPHILS NFR BLD AUTO: 1 % (ref 0–1)
BILIRUB SERPL-MCNC: 0.59 MG/DL (ref 0.2–1)
BILIRUB UR QL STRIP: NEGATIVE
BUN SERPL-MCNC: 16 MG/DL (ref 5–25)
CALCIUM SERPL-MCNC: 9.3 MG/DL (ref 8.3–10.1)
CHLORIDE SERPL-SCNC: 101 MMOL/L (ref 100–108)
CLARITY UR: ABNORMAL
CO2 SERPL-SCNC: 27 MMOL/L (ref 21–32)
COLOR UR: YELLOW
CREAT SERPL-MCNC: 1.28 MG/DL (ref 0.6–1.3)
EOSINOPHIL # BLD AUTO: 0.06 THOUSAND/ΜL (ref 0–0.61)
EOSINOPHIL NFR BLD AUTO: 1 % (ref 0–6)
ERYTHROCYTE [DISTWIDTH] IN BLOOD BY AUTOMATED COUNT: 13.1 % (ref 11.6–15.1)
GFR SERPL CREATININE-BSD FRML MDRD: 36 ML/MIN/1.73SQ M
GLUCOSE SERPL-MCNC: 245 MG/DL (ref 65–140)
GLUCOSE UR STRIP-MCNC: ABNORMAL MG/DL
HCT VFR BLD AUTO: 41.2 % (ref 34.8–46.1)
HGB BLD-MCNC: 12.9 G/DL (ref 11.5–15.4)
HGB UR QL STRIP.AUTO: NEGATIVE
IMM GRANULOCYTES # BLD AUTO: 0.02 THOUSAND/UL (ref 0–0.2)
IMM GRANULOCYTES NFR BLD AUTO: 0 % (ref 0–2)
KETONES UR STRIP-MCNC: NEGATIVE MG/DL
LEUKOCYTE ESTERASE UR QL STRIP: ABNORMAL
LYMPHOCYTES # BLD AUTO: 1.47 THOUSANDS/ΜL (ref 0.6–4.47)
LYMPHOCYTES NFR BLD AUTO: 23 % (ref 14–44)
MCH RBC QN AUTO: 27.2 PG (ref 26.8–34.3)
MCHC RBC AUTO-ENTMCNC: 31.3 G/DL (ref 31.4–37.4)
MCV RBC AUTO: 87 FL (ref 82–98)
MONOCYTES # BLD AUTO: 0.69 THOUSAND/ΜL (ref 0.17–1.22)
MONOCYTES NFR BLD AUTO: 11 % (ref 4–12)
NEUTROPHILS # BLD AUTO: 4.03 THOUSANDS/ΜL (ref 1.85–7.62)
NEUTS SEG NFR BLD AUTO: 64 % (ref 43–75)
NITRITE UR QL STRIP: NEGATIVE
NON-SQ EPI CELLS URNS QL MICRO: ABNORMAL /HPF
NRBC BLD AUTO-RTO: 0 /100 WBCS
P AXIS: 69 DEGREES
PH UR STRIP.AUTO: 7.5 [PH] (ref 4.5–8)
PLATELET # BLD AUTO: 351 THOUSANDS/UL (ref 149–390)
PMV BLD AUTO: 9.1 FL (ref 8.9–12.7)
POTASSIUM SERPL-SCNC: 3.8 MMOL/L (ref 3.5–5.3)
PR INTERVAL: 184 MS
PROT SERPL-MCNC: 7.3 G/DL (ref 6.4–8.2)
PROT UR STRIP-MCNC: ABNORMAL MG/DL
QRS AXIS: -35 DEGREES
QRSD INTERVAL: 96 MS
QT INTERVAL: 414 MS
QTC INTERVAL: 434 MS
RBC # BLD AUTO: 4.75 MILLION/UL (ref 3.81–5.12)
RBC #/AREA URNS AUTO: ABNORMAL /HPF
SODIUM SERPL-SCNC: 138 MMOL/L (ref 136–145)
SP GR UR STRIP.AUTO: 1.01 (ref 1–1.03)
T WAVE AXIS: 51 DEGREES
TROPONIN I SERPL-MCNC: <0.02 NG/ML
TSH SERPL DL<=0.05 MIU/L-ACNC: 0.75 UIU/ML (ref 0.36–3.74)
UROBILINOGEN UR QL STRIP.AUTO: 0.2 E.U./DL
VENTRICULAR RATE: 66 BPM
WBC # BLD AUTO: 6.31 THOUSAND/UL (ref 4.31–10.16)
WBC #/AREA URNS AUTO: ABNORMAL /HPF

## 2018-12-02 PROCEDURE — 80053 COMPREHEN METABOLIC PANEL: CPT | Performed by: EMERGENCY MEDICINE

## 2018-12-02 PROCEDURE — 96360 HYDRATION IV INFUSION INIT: CPT

## 2018-12-02 PROCEDURE — 84443 ASSAY THYROID STIM HORMONE: CPT | Performed by: EMERGENCY MEDICINE

## 2018-12-02 PROCEDURE — 81001 URINALYSIS AUTO W/SCOPE: CPT

## 2018-12-02 PROCEDURE — 84484 ASSAY OF TROPONIN QUANT: CPT | Performed by: EMERGENCY MEDICINE

## 2018-12-02 PROCEDURE — 36415 COLL VENOUS BLD VENIPUNCTURE: CPT

## 2018-12-02 PROCEDURE — 71046 X-RAY EXAM CHEST 2 VIEWS: CPT

## 2018-12-02 PROCEDURE — 99285 EMERGENCY DEPT VISIT HI MDM: CPT

## 2018-12-02 PROCEDURE — 85025 COMPLETE CBC W/AUTO DIFF WBC: CPT | Performed by: EMERGENCY MEDICINE

## 2018-12-02 PROCEDURE — 93010 ELECTROCARDIOGRAM REPORT: CPT | Performed by: INTERNAL MEDICINE

## 2018-12-02 PROCEDURE — 93005 ELECTROCARDIOGRAM TRACING: CPT

## 2018-12-02 RX ORDER — PREDNISOLONE ACETATE 10 MG/ML
1 SUSPENSION/ DROPS OPHTHALMIC DAILY
COMMUNITY
Start: 2015-02-10

## 2018-12-02 RX ORDER — NAPROXEN 500 MG/1
500 TABLET ORAL EVERY 12 HOURS PRN
Qty: 15 TABLET | Refills: 0 | Status: SHIPPED | OUTPATIENT
Start: 2018-12-02 | End: 2021-05-19 | Stop reason: ALTCHOICE

## 2018-12-02 RX ADMIN — SODIUM CHLORIDE 1000 ML: 0.9 INJECTION, SOLUTION INTRAVENOUS at 17:25

## 2018-12-02 NOTE — ED PROCEDURE NOTE
PROCEDURE  ECG 12 Lead Documentation  Date/Time: 12/2/2018 4:19 PM  Performed by: Randy Rush  Authorized by: Randy Rush     Indications / Diagnosis:  Chest pain  ECG reviewed by me, the ED Provider: yes    Patient location:  ED  Interpretation:     Interpretation: normal    Rate:     ECG rate assessment: normal    Rhythm:     Rhythm: sinus rhythm    Ectopy:     Ectopy: none    Conduction:     Conduction: normal    ST segments:     ST segments:  Normal  T waves:     T waves: normal           Leelee Garza DO  12/02/18 1619

## 2018-12-02 NOTE — ED NOTES
Pt and family member asking when she will be leaving  RN explains that there is an order for urine that needs to be collected and this may be what is holding up her D/C  Family and patient report that patient does not have to urinate at this time         Leila Nichole RN  12/02/18 6600

## 2018-12-02 NOTE — ED NOTES
Pt reports generalized weakness, body aches, "cold then hot" states ear pain since September also states "it feels like there's a brick on my stomach, heavy", denies n/v, reports diarrhea "i went twice today, I have a problem with my bowels"      Oerstes Tai RN  12/02/18 6542

## 2018-12-02 NOTE — ED PROVIDER NOTES
History  Chief Complaint   Patient presents with    Weakness - Generalized     Patient reports generalized weakness and malaise  ED last week for same  Denies pain, other than chronic abdominal pain, reports increased bowel function this morning  49-year-old female with a history of diabetes and hypertension with recent admission to the hospital for UTI/pneumonia presents to the emergency department with I feel sick patient states last night she felt like she had pain all over  She also feels very cold and then feels very hot but no documented fevers  No nausea, vomiting or diarrhea  No dysuria  No cough  She states last evening she had chest pain but that resolved and she has not had any pain today  No shortness of breath  She states today she feels fatigued and weak          History provided by:  Patient   used: No    Malaise - 7 years or greater   Severity:  Unable to specify  Onset quality:  Gradual  Duration:  1 day  Timing:  Constant  Progression:  Unchanged  Chronicity:  Recurrent  Context: recent infection and urinary tract infection    Context: not change in medication and not dehydration    Relieved by:  None tried  Worsened by:  Nothing  Ineffective treatments:  None tried  Associated symptoms: chest pain and myalgias    Associated symptoms: no abdominal pain, no anorexia, no aphasia, no arthralgias, no ataxia, no cough, no diarrhea, no difficulty walking, no dizziness, no drooling, no dysphagia, no dysuria, no numbness in extremities, no falls, no fever, no foul-smelling urine, no frequency, no headaches, no hematochezia, no lethargy, no loss of consciousness, no melena, no nausea, no near-syncope, no seizures, no sensory-motor deficit, no shortness of breath, no stroke symptoms, no syncope, no urgency, no vision change and no vomiting    Chest pain:     Quality comment:  Unable to specify    Severity:  Unable to specify    Onset quality:  Gradual    Progression: Resolved  Risk factors: diabetes    Risk factors: no anemia, no coronary artery disease, no neurologic disease and no new medications        Prior to Admission Medications   Prescriptions Last Dose Informant Patient Reported? Taking? SITagliptin Phosphate (JANUVIA PO)  Self Yes Yes   Sig: Take by mouth daily Pt unsure dose    Timolol Maleate (TIMOPTIC OP)   Yes Yes   Sig: Administer 1 drop into the left eye daily at bedtime     carvedilol (COREG) 12 5 mg tablet   Yes Yes   Sig: Take 12 5 mg by mouth 2 (two) times a day with meals     glimepiride (AMARYL) 1 mg tablet   No Yes   Sig: Take 1 tablet (1 mg total) by mouth daily with breakfast   lisinopril (ZESTRIL) 5 mg tablet   Yes Yes   Sig: Take 5 mg by mouth   pravastatin (PRAVACHOL) 40 mg tablet   Yes Yes   Sig: Take 40 mg by mouth daily   prednisoLONE acetate (PRED FORTE) 1 % ophthalmic suspension   Yes Yes   Sig: Administer 1 % into the left eye daily        Facility-Administered Medications: None       Past Medical History:   Diagnosis Date    Diabetes mellitus ([facility] [address] )     Hypertension     Migraine        No past surgical history on file  No family history on file  I have reviewed and agree with the history as documented  Social History   Substance Use Topics    Smoking status: Never Smoker    Smokeless tobacco: Not on file    Alcohol use No        Review of Systems   Constitutional: Negative  Negative for chills, diaphoresis, fatigue and fever  HENT: Negative  Negative for congestion, drooling, rhinorrhea and sore throat  Eyes: Negative  Negative for discharge, redness and itching  Respiratory: Negative  Negative for apnea, cough, chest tightness, shortness of breath and wheezing  Cardiovascular: Positive for chest pain  Negative for palpitations, leg swelling, syncope and near-syncope  Gastrointestinal: Negative  Negative for abdominal pain, anorexia, diarrhea, dysphagia, hematochezia, melena, nausea and vomiting     Endocrine: Negative  Genitourinary: Negative  Negative for dysuria, flank pain, frequency and urgency  Musculoskeletal: Positive for myalgias  Negative for arthralgias, back pain and falls  Skin: Negative  Allergic/Immunologic: Negative  Neurological: Negative for dizziness, seizures, loss of consciousness, syncope, weakness, light-headedness, numbness and headaches  Hematological: Negative  All other systems reviewed and are negative  Physical Exam  Physical Exam   Constitutional: She is oriented to person, place, and time  She appears well-developed and well-nourished  She appears listless  Non-toxic appearance  She does not have a sickly appearance  She does not appear ill  No distress  HENT:   Head: Normocephalic and atraumatic  Right Ear: Tympanic membrane and external ear normal    Left Ear: Tympanic membrane and external ear normal    Nose: Nose normal    Mouth/Throat: Oropharynx is clear and moist  Mucous membranes are dry  No oropharyngeal exudate  Eyes: Pupils are equal, round, and reactive to light  Conjunctivae and EOM are normal  Right eye exhibits no discharge  Left eye exhibits no discharge  No scleral icterus  Neck: Normal range of motion  Neck supple  No thyromegaly present  Cardiovascular: Normal rate, regular rhythm and normal heart sounds  Exam reveals no gallop and no friction rub  No murmur heard  Pulmonary/Chest: Effort normal and breath sounds normal  No stridor  No respiratory distress  She has no wheezes  She has no rales  She exhibits no tenderness  Abdominal: Soft  Bowel sounds are normal  She exhibits no distension and no mass  There is no tenderness  No hernia  Musculoskeletal: Normal range of motion  She exhibits no edema, tenderness or deformity  Lymphadenopathy:     She has no cervical adenopathy  Neurological: She is oriented to person, place, and time  She has normal reflexes  She appears listless  She displays normal reflexes   No cranial nerve deficit  She exhibits normal muscle tone  Coordination normal    Skin: Skin is warm and dry  No rash noted  She is not diaphoretic  No erythema  No pallor  Psychiatric: She has a normal mood and affect  Nursing note and vitals reviewed        Vital Signs  ED Triage Vitals   Temperature Pulse Respirations Blood Pressure SpO2   12/02/18 1835 12/02/18 1543 12/02/18 1543 12/02/18 1543 12/02/18 1543   100 °F (37 8 °C) 68 16 (!) 191/84 99 %      Temp Source Heart Rate Source Patient Position - Orthostatic VS BP Location FiO2 (%)   12/02/18 1835 -- 12/02/18 1543 12/02/18 1543 --   Temporal  Lying Right arm       Pain Score       12/02/18 1543       No Pain           Vitals:    12/02/18 1543 12/02/18 1726 12/02/18 1901 12/02/18 1916   BP: (!) 191/84 (!) 187/81 (!) 202/88 (!) 175/78   Pulse: 68 68 80 70   Patient Position - Orthostatic VS: Lying Lying Lying Lying       Visual Acuity      ED Medications  Medications   sodium chloride 0 9 % bolus 1,000 mL (0 mL Intravenous Stopped 12/2/18 1834)       Diagnostic Studies  Results Reviewed     Procedure Component Value Units Date/Time    Urine Microscopic [[id number]]  (Abnormal) Collected:  12/02/18 1919    Lab Status:  Final result Specimen:  Urine from Urine, Clean Catch Updated:  12/02/18 1941     RBC, UA None Seen /hpf      WBC, UA 1-2 (A) /hpf      Epithelial Cells Occasional /hpf      Bacteria, UA Occasional /hpf     POCT urinalysis dipstick [[id number]]  (Abnormal) Resulted:  12/02/18 1906    Lab Status:  Final result Specimen:  Urine Updated:  12/02/18 1906    ED Urine Macroscopic [[id number]]  (Abnormal) Collected:  12/02/18 1919    Lab Status:  Final result Specimen:  Urine Updated:  12/02/18 1905     Color, UA Yellow     Clarity, UA Cloudy     pH, UA 7 5     Leukocytes, UA Trace (A)     Nitrite, UA Negative     Protein, UA 30 (1+) (A) mg/dl      Glucose,  (1/10%) (A) mg/dl      Ketones, UA Negative mg/dl      Urobilinogen, UA 0 2 E U /dl      Bilirubin, UA Negative     Blood, UA Negative     Specific Gravity, UA 1 015    Narrative:       CLINITEK RESULT    TSH [[id number]]  (Normal) Collected:  12/02/18 1625    Lab Status:  Final result Specimen:  Blood from Arm, Right Updated:  12/02/18 1809     TSH 3RD GENERATON 0 753 uIU/mL     Narrative:         Patients undergoing fluorescein dye angiography may retain small amounts of fluorescein in the body for 48-72 hours post procedure  Samples containing fluorescein can produce falsely depressed TSH values  If the patient had this procedure,a specimen should be resubmitted post fluorescein clearance  The recommended reference ranges for TSH during pregnancy are as follows:  First trimester 0 1 to 2 5 uIU/mL  Second trimester  0 2 to 3 0 uIU/mL  Third trimester 0 3 to 3 0 uIU/m      Troponin I [[id number]]  (Normal) Collected:  12/02/18 1555    Lab Status:  Final result Specimen:  Blood from Arm, Left Updated:  12/02/18 1624     Troponin I <0 02 ng/mL     Comprehensive metabolic panel [[id number]]  (Abnormal) Collected:  12/02/18 1555    Lab Status:  Final result Specimen:  Blood from Hand, Left Updated:  12/02/18 1622     Sodium 138 mmol/L      Potassium 3 8 mmol/L      Chloride 101 mmol/L      CO2 27 mmol/L      ANION GAP 10 mmol/L      BUN 16 mg/dL      Creatinine 1 28 mg/dL      Glucose 245 (H) mg/dL      Calcium 9 3 mg/dL      AST 16 U/L      ALT 15 U/L      Alkaline Phosphatase 190 (H) U/L      Total Protein 7 3 g/dL      Albumin 3 1 (L) g/dL      Total Bilirubin 0 59 mg/dL      eGFR 36 ml/min/1 73sq m     Narrative:         National Kidney Disease Education Program recommendations are as follows:  GFR calculation is accurate only with a steady state creatinine  Chronic Kidney disease less than 60 ml/min/1 73 sq  meters  Kidney failure less than 15 ml/min/1 73 sq  meters      CBC and differential [[id number]]  (Abnormal) Collected:  12/02/18 1555    Lab Status:  Final result Specimen:  Blood from Hand, Left Updated: 12/02/18 1609     WBC 6 31 Thousand/uL      RBC 4 75 Million/uL      Hemoglobin 12 9 g/dL      Hematocrit 41 2 %      MCV 87 fL      MCH 27 2 pg      MCHC 31 3 (L) g/dL      RDW 13 1 %      MPV 9 1 fL      Platelets 718 Thousands/uL      nRBC 0 /100 WBCs      Neutrophils Relative 64 %      Immat GRANS % 0 %      Lymphocytes Relative 23 %      Monocytes Relative 11 %      Eosinophils Relative 1 %      Basophils Relative 1 %      Neutrophils Absolute 4 03 Thousands/µL      Immature Grans Absolute 0 02 Thousand/uL      Lymphocytes Absolute 1 47 Thousands/µL      Monocytes Absolute 0 69 Thousand/µL      Eosinophils Absolute 0 06 Thousand/µL      Basophils Absolute 0 04 Thousands/µL                  X-ray chest 2 views   ED Interpretation by Genesis Garber DO (12/02 1620)   nad                 Procedures  Procedures       Phone Contacts  ED Phone Contact    ED Course  ED Course as of Dec 02 2007   Sun Dec 02, 2018   2004 Patient patient's family updated regarding results  Patient and patient's family updated regarding results patient states she feels better and would like to go home  She feels she is safe going home  Daughter states that the patient can stay with her if she needs to  MDM  Number of Diagnoses or Management Options  Diagnosis management comments: 79-year-old female presents with generalized fatigue, chills, body aches no documented fevers or chills  No vomiting  On exam she appears tired but is in no acute distress  She does appear dehydrated on exam   The rest of her exam is normal   Will check electrolytes, white count, TSH, urinalysis, chest x-ray to rule out pneumonia/UTI  Will give IV fluids and reassess         Amount and/or Complexity of Data Reviewed  Clinical lab tests: ordered and reviewed  Tests in the radiology section of CPT®: ordered and reviewed  Review and summarize past medical records: yes  Independent visualization of images, tracings, or specimens: yes      CritCare Time    Disposition  Final diagnoses:   Fatigue   Myalgia     Time reflects when diagnosis was documented in both MDM as applicable and the Disposition within this note     Time User Action Codes Description Comment    12/2/2018  8:05 PM Lucia Alberts [R53 [id number]] Fatigue     12/2/2018  8:06 PM Lucia Alberts [[id number]] Myalgia       ED Disposition     ED Disposition Condition Comment    Discharge  [address] discharge to home/self care  Condition at discharge: Good        Follow-up Information     Follow up With Specialties Details Why Contact Info    Az Lockwood DO  Schedule an appointment as soon as possible for a visit in 1 day  [address]            Patient's Medications   Discharge Prescriptions    NAPROXEN (NAPROSYN) 500 MG TABLET    Take 1 tablet (500 mg total) by mouth every 12 (twelve) hours as needed for mild pain or moderate pain       Start Date: 12/2/2018 End Date: --       Order Dose: 500 mg       Quantity: 15 tablet    Refills: 0     No discharge procedures on file      ED Provider  Electronically Signed by           Kelly Hutchins DO  12/02/18 2007

## 2018-12-03 NOTE — DISCHARGE INSTRUCTIONS
Fatigue   WHAT YOU NEED TO KNOW:   Fatigue is mental and physical exhaustion that does not get better with rest  Fatigue may make daily activities difficult or cause extreme sleepiness  It is normal to feel tired sometimes, but long-term fatigue may be a sign of serious illness  DISCHARGE INSTRUCTIONS:   Return to the emergency department if:   · You have chest pain  · You have difficulty breathing  Contact your healthcare provider if:   · You have a cough that gets worse, or does not go away  · You see blood in your urine or bowel movement  · You have numbness or tingling around your mouth or in an arm or leg  · You faint, feel dizzy, or have vision changes  · You have swelling in your lymph nodes  · You are a woman and have vaginal bleeding that is not normal for you, or is not expected  · You lose weight without trying, or you have trouble eating  · You feel weak or have muscle pain  · You have pain or swelling in your joints  · You have questions or concerns about your condition or care  Follow up with your healthcare provider as directed: You may need more tests  Your healthcare provider may also refer you to a specialist  Write down your questions so you remember to ask them during your visits  Manage fatigue:   · Keep a fatigue diary  Include anything that makes you feel more tired or less tired  Bring the diary with you to follow-up visits with your provider  · Exercise as directed  Exercise can help you feel more alert  Exercise can also help you manage stress or relieve depression  Try to get at least 30 minutes of exercise most days of the week  · Keep a regular sleep schedule  Go to bed and wake up at the same times every day  Limit naps to 1 hour each day  A nap can improve fatigue, but a long nap may make it harder to go to sleep at night  · Plan and limit your activities    Limit the number of activities such as shopping and cleaning you do each day  If possible, try to spread out your trips throughout the week  Plan ahead so you are not rushing to get something done  Only do activities that you have the energy to complete  Take breaks between activities  Ask for help if you need it  Another person may be able to drive you or help with daily activities  · Eat a variety of healthy foods  Healthy foods include fruits, vegetables, whole-grain breads, low-fat dairy products, beans, lean meats, and fish  Good nutrition can help manage fatigue  · Limit caffeine and alcohol  These can make it difficult to fall or stay asleep  Women should limit alcohol to 1 drink a day  Men should limit alcohol to 2 drinks a day  A drink of alcohol is 12 ounces of beer, 5 ounces of wine, or 1½ ounces of liquor  Ask our healthcare provider how much caffeine is safe for you  · Do not smoke  Nicotine and other chemicals in cigarettes and cigars can cause lung damage and increase fatigue  Ask your healthcare provider for information if you currently smoke and need help to quit  E-cigarettes or smokeless tobacco still contain nicotine  Talk to your healthcare provider before you use these products  © 2017 [facility] Information is for End User's use only and may not be sold, redistributed or otherwise used for commercial purposes  All illustrations and images included in CareNotes® are the copyrighted property of [employer]  or [employer]  The above information is an  only  It is not intended as medical advice for individual conditions or treatments  Talk to your doctor, nurse or pharmacist before following any medical regimen to see if it is safe and effective for you  Musculoskeletal Pain   WHAT YOU NEED TO KNOW:   Muscle pain can be dull, achy, or sharp  You may have pain and tenderness to the touch as well  It can occur anywhere on your body and is often brought on by exercise   Muscle pain may occur from an injury, such as a sprain, tendonitis, or bone fracture  Muscle pain can also be the result of medical conditions, such as polymyositis, fibromyalgia, and connective tissue disorders  DISCHARGE INSTRUCTIONS:   Self care:   · Rest  as directed and avoid activity that causes pain  You may be able to return to normal activity when you can move without pain  Follow directions for rest and activity  You are at risk for injury for 3 weeks after your symptoms go away  · Ice  your painful muscle area to decrease pain and swelling  Use an ice pack, or put ice in a plastic bag and cover it with a towel  Always  put a cloth between the ice and your skin  Apply the ice as often as directed for the first 24 to 48 hours  · Compression  with a splint, brace, or elastic bandage helps decrease pain and swelling  This may be needed for muscle pain in arms or legs  A splint, brace, or bandage will also help protect the painful area when you move around  · Elevate  a painful arm or leg to reduce swelling and pain  Elevate your limb while you are sitting or lying down  Prop a painful leg on pillows to keep it above the level of your heart  Medicines:   · NSAIDs  help decrease swelling and pain or fever  This medicine is available with or without a doctor's order  NSAIDs can cause stomach bleeding or kidney problems in certain people  If you take blood thinner medicine, always ask your healthcare provider if NSAIDs are safe for you  Always read the medicine label and follow directions  · Acetaminophen  is used to decrease pain  It is available without a doctor's order  Ask your healthcare provider how much to take and when to take it  Follow directions  Acetaminophen can cause liver damage if not taken correctly  Do not take more than one medicine that contains acetaminophen unless directed  · Muscle relaxers  help relax your muscles to decrease pain and muscle spasms       · Steroids  may be given to decrease redness, pain, and swelling  · Take your medicine as directed  Contact your healthcare provider if you think your medicine is not helping or if you have side effects  Tell him if you are allergic to any medicine  Keep a list of the medicines, vitamins, and herbs you take  Include the amounts, and when and why you take them  Bring the list or the pill bottles to follow-up visits  Carry your medicine list with you in case of an emergency  Follow up with your healthcare provider as directed: You may need more tests to help healthcare providers find the cause of your muscle pain  You may need physical therapy to learn muscle strengthening exercises  Write down your questions so you remember to ask them during your visits  Contact your healthcare provider if:   · You have a fever  · You have trouble sleeping because of your pain  · Your painful area becomes more tender, red, and warm to the touch  · You have decreased movement of the painful area  · You have questions or concerns about your condition or care  Return to the emergency department if:   · You have increased severe pain when you move the painful muscle area  · You lose feeling in your painful muscle area  · You have new or worse swelling in the painful area  Your skin may feel tight  · You have increased muscle pain or pain that does not improve with treatment  © 2017 [facility] Information is for End User's use only and may not be sold, redistributed or otherwise used for commercial purposes  All illustrations and images included in CareNotes® are the copyrighted property of [employer]  or [employer]oni [employer]  The above information is an  only  It is not intended as medical advice for individual conditions or treatments  Talk to your doctor, nurse or pharmacist before following any medical regimen to see if it is safe and effective for you

## 2018-12-10 ENCOUNTER — APPOINTMENT (EMERGENCY)
Dept: RADIOLOGY | Facility: HOSPITAL | Age: 83
End: 2018-12-10
Payer: MEDICARE

## 2018-12-10 ENCOUNTER — HOSPITAL ENCOUNTER (OUTPATIENT)
Facility: HOSPITAL | Age: 83
Setting detail: OBSERVATION
Discharge: HOME HEALTH | End: 2018-12-11
Attending: EMERGENCY MEDICINE | Admitting: GENERAL PRACTICE
Payer: MEDICARE
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
## 2018-12-10 DIAGNOSIS — I10 HYPERTENSION: ICD-10-CM

## 2018-12-10 DIAGNOSIS — R07.89 CHEST PAIN, ATYPICAL: Primary | ICD-10-CM

## 2018-12-10 LAB
ALBUMIN SERPL BCP-MCNC: 3.1 G/DL (ref 3.5–5)
ALP SERPL-CCNC: 157 U/L (ref 46–116)
ALT SERPL W P-5'-P-CCNC: 12 U/L (ref 12–78)
ANION GAP SERPL CALCULATED.3IONS-SCNC: 4 MMOL/L (ref 4–13)
AST SERPL W P-5'-P-CCNC: 13 U/L (ref 5–45)
ATRIAL RATE: 63 BPM
BASOPHILS # BLD AUTO: 0.04 THOUSANDS/ΜL (ref 0–0.1)
BASOPHILS NFR BLD AUTO: 0 % (ref 0–1)
BILIRUB SERPL-MCNC: 0.53 MG/DL (ref 0.2–1)
BUN SERPL-MCNC: 16 MG/DL (ref 5–25)
CALCIUM SERPL-MCNC: 9.4 MG/DL (ref 8.3–10.1)
CHLORIDE SERPL-SCNC: 104 MMOL/L (ref 100–108)
CO2 SERPL-SCNC: 29 MMOL/L (ref 21–32)
CREAT SERPL-MCNC: 1.23 MG/DL (ref 0.6–1.3)
EOSINOPHIL # BLD AUTO: 0.08 THOUSAND/ΜL (ref 0–0.61)
EOSINOPHIL NFR BLD AUTO: 1 % (ref 0–6)
ERYTHROCYTE [DISTWIDTH] IN BLOOD BY AUTOMATED COUNT: 13.5 % (ref 11.6–15.1)
GFR SERPL CREATININE-BSD FRML MDRD: 37 ML/MIN/1.73SQ M
GLUCOSE SERPL-MCNC: 141 MG/DL (ref 65–140)
GLUCOSE SERPL-MCNC: 184 MG/DL (ref 65–140)
GLUCOSE SERPL-MCNC: 219 MG/DL (ref 65–140)
HCT VFR BLD AUTO: 38.7 % (ref 34.8–46.1)
HGB BLD-MCNC: 12.1 G/DL (ref 11.5–15.4)
IMM GRANULOCYTES # BLD AUTO: 0.03 THOUSAND/UL (ref 0–0.2)
IMM GRANULOCYTES NFR BLD AUTO: 0 % (ref 0–2)
LIPASE SERPL-CCNC: 849 U/L (ref 73–393)
LYMPHOCYTES # BLD AUTO: 1.16 THOUSANDS/ΜL (ref 0.6–4.47)
LYMPHOCYTES NFR BLD AUTO: 11 % (ref 14–44)
MAGNESIUM SERPL-MCNC: 2.2 MG/DL (ref 1.6–2.6)
MCH RBC QN AUTO: 27.4 PG (ref 26.8–34.3)
MCHC RBC AUTO-ENTMCNC: 31.3 G/DL (ref 31.4–37.4)
MCV RBC AUTO: 88 FL (ref 82–98)
MONOCYTES # BLD AUTO: 0.83 THOUSAND/ΜL (ref 0.17–1.22)
MONOCYTES NFR BLD AUTO: 8 % (ref 4–12)
NEUTROPHILS # BLD AUTO: 8.35 THOUSANDS/ΜL (ref 1.85–7.62)
NEUTS SEG NFR BLD AUTO: 80 % (ref 43–75)
NRBC BLD AUTO-RTO: 0 /100 WBCS
P AXIS: 79 DEGREES
PLATELET # BLD AUTO: 304 THOUSANDS/UL (ref 149–390)
PMV BLD AUTO: 9.4 FL (ref 8.9–12.7)
POTASSIUM SERPL-SCNC: 4.1 MMOL/L (ref 3.5–5.3)
PR INTERVAL: 186 MS
PROT SERPL-MCNC: 6.9 G/DL (ref 6.4–8.2)
QRS AXIS: -40 DEGREES
QRSD INTERVAL: 98 MS
QT INTERVAL: 408 MS
QTC INTERVAL: 417 MS
RBC # BLD AUTO: 4.42 MILLION/UL (ref 3.81–5.12)
SODIUM SERPL-SCNC: 137 MMOL/L (ref 136–145)
T WAVE AXIS: 53 DEGREES
TROPONIN I SERPL-MCNC: <0.02 NG/ML
VENTRICULAR RATE: 63 BPM
WBC # BLD AUTO: 10.49 THOUSAND/UL (ref 4.31–10.16)

## 2018-12-10 PROCEDURE — 84484 ASSAY OF TROPONIN QUANT: CPT | Performed by: EMERGENCY MEDICINE

## 2018-12-10 PROCEDURE — 36415 COLL VENOUS BLD VENIPUNCTURE: CPT

## 2018-12-10 PROCEDURE — 80053 COMPREHEN METABOLIC PANEL: CPT | Performed by: EMERGENCY MEDICINE

## 2018-12-10 PROCEDURE — 83735 ASSAY OF MAGNESIUM: CPT | Performed by: STUDENT IN AN ORGANIZED HEALTH CARE EDUCATION/TRAINING PROGRAM

## 2018-12-10 PROCEDURE — 84484 ASSAY OF TROPONIN QUANT: CPT | Performed by: GENERAL PRACTICE

## 2018-12-10 PROCEDURE — 99219 PR INITIAL OBSERVATION CARE/DAY 50 MINUTES: CPT | Performed by: GENERAL PRACTICE

## 2018-12-10 PROCEDURE — 99285 EMERGENCY DEPT VISIT HI MDM: CPT

## 2018-12-10 PROCEDURE — 93010 ELECTROCARDIOGRAM REPORT: CPT | Performed by: INTERNAL MEDICINE

## 2018-12-10 PROCEDURE — 1124F ACP DISCUSS-NO DSCNMKR DOCD: CPT | Performed by: INTERNAL MEDICINE

## 2018-12-10 PROCEDURE — 93005 ELECTROCARDIOGRAM TRACING: CPT

## 2018-12-10 PROCEDURE — 76705 ECHO EXAM OF ABDOMEN: CPT

## 2018-12-10 PROCEDURE — 82948 REAGENT STRIP/BLOOD GLUCOSE: CPT

## 2018-12-10 PROCEDURE — 85025 COMPLETE CBC W/AUTO DIFF WBC: CPT | Performed by: EMERGENCY MEDICINE

## 2018-12-10 PROCEDURE — 71046 X-RAY EXAM CHEST 2 VIEWS: CPT

## 2018-12-10 PROCEDURE — 83690 ASSAY OF LIPASE: CPT | Performed by: STUDENT IN AN ORGANIZED HEALTH CARE EDUCATION/TRAINING PROGRAM

## 2018-12-10 RX ORDER — FAMOTIDINE 20 MG/1
20 TABLET, FILM COATED ORAL
Status: DISCONTINUED | OUTPATIENT
Start: 2018-12-10 | End: 2018-12-11 | Stop reason: HOSPADM

## 2018-12-10 RX ORDER — PRAVASTATIN SODIUM 40 MG
40 TABLET ORAL
Status: DISCONTINUED | OUTPATIENT
Start: 2018-12-11 | End: 2018-12-11 | Stop reason: HOSPADM

## 2018-12-10 RX ORDER — FAMOTIDINE 20 MG/1
40 TABLET, FILM COATED ORAL
Status: DISCONTINUED | OUTPATIENT
Start: 2018-12-10 | End: 2018-12-10

## 2018-12-10 RX ORDER — CARVEDILOL 12.5 MG/1
12.5 TABLET ORAL 2 TIMES DAILY WITH MEALS
Status: DISCONTINUED | OUTPATIENT
Start: 2018-12-10 | End: 2018-12-11 | Stop reason: HOSPADM

## 2018-12-10 RX ORDER — PREDNISOLONE ACETATE 10 MG/ML
1 SUSPENSION/ DROPS OPHTHALMIC DAILY
Status: DISCONTINUED | OUTPATIENT
Start: 2018-12-11 | End: 2018-12-11 | Stop reason: HOSPADM

## 2018-12-10 RX ORDER — LANOLIN ALCOHOL/MO/W.PET/CERES
3 CREAM (GRAM) TOPICAL
Status: DISCONTINUED | OUTPATIENT
Start: 2018-12-10 | End: 2018-12-11 | Stop reason: HOSPADM

## 2018-12-10 RX ORDER — ACETAMINOPHEN 325 MG/1
650 TABLET ORAL ONCE
Status: COMPLETED | OUTPATIENT
Start: 2018-12-10 | End: 2018-12-10

## 2018-12-10 RX ORDER — TIMOLOL MALEATE 5 MG/ML
1 SOLUTION/ DROPS OPHTHALMIC
Status: DISCONTINUED | OUTPATIENT
Start: 2018-12-10 | End: 2018-12-11 | Stop reason: HOSPADM

## 2018-12-10 RX ORDER — LISINOPRIL 5 MG/1
5 TABLET ORAL DAILY
Status: DISCONTINUED | OUTPATIENT
Start: 2018-12-11 | End: 2018-12-11 | Stop reason: HOSPADM

## 2018-12-10 RX ORDER — ACETAMINOPHEN 325 MG/1
650 TABLET ORAL EVERY 6 HOURS PRN
Status: DISCONTINUED | OUTPATIENT
Start: 2018-12-10 | End: 2018-12-11 | Stop reason: HOSPADM

## 2018-12-10 RX ADMIN — ACETAMINOPHEN 650 MG: 325 TABLET ORAL at 15:04

## 2018-12-10 RX ADMIN — INSULIN LISPRO 1 UNITS: 100 INJECTION, SOLUTION INTRAVENOUS; SUBCUTANEOUS at 22:18

## 2018-12-10 RX ADMIN — FAMOTIDINE 20 MG: 20 TABLET ORAL at 22:10

## 2018-12-10 RX ADMIN — ACETAMINOPHEN 650 MG: 325 TABLET ORAL at 19:38

## 2018-12-10 RX ADMIN — MELATONIN 3 MG: at 22:10

## 2018-12-10 RX ADMIN — TIMOLOL MALEATE 1 DROP: 5 SOLUTION/ DROPS OPHTHALMIC at 22:17

## 2018-12-10 RX ADMIN — CARVEDILOL 12.5 MG: 12.5 TABLET, FILM COATED ORAL at 18:02

## 2018-12-10 NOTE — ED ATTENDING ATTESTATION
Bharat Angel MD, saw and evaluated the patient  I have discussed the patient with the resident/non-physician practitioner and agree with the resident's/non-physician practitioner's findings, Plan of Care, and MDM as documented in the resident's/non-physician practitioner's note, except where noted  All available labs and Radiology studies were reviewed  At this point I agree with the current assessment done in the Emergency Department  I have conducted an independent evaluation of this patient a history and physical is as follows:  Epigastric/lower chest pain  This is a 51-year-old woman who was admitted to the hospital in November with epigastric pain  The patient states that at that time she had had a syncopal event and struck her lower chest against the edge of the sink  The patient was admitted to the hospital, although her imaging was negative for injury at that time  Patient had an echocardiogram and a CT scan while inpatient and was discharged home  She has continued to have epigastric discomfort since that time, but it was much worse last night  Patient has not had nausea or vomiting  The pain has not migrated  She is not short of breath with it, although she did develop a cough last night  The patient denies numbness, tingling, or weakness  She has not had fevers or chills  Review of systems negative in 12 systems reviewed  On exam the patient is hypertensive  Her exam is otherwise entirely nonfocal   Impression:  Epigastric/abdominal pain    Will plan to CT to rule out dissection/vascular injury, will ultrasound gallbladder, cardiac evaluation, admit    Critical Care Time  CritCare Time    Procedures

## 2018-12-10 NOTE — ED PROVIDER NOTES
History  Chief Complaint   Patient presents with    Chest Pain     substernal chest pain since falling off the toilet and hitting her chest on the bathtub 1 month ago  Pt has been seen for this multiple times  This is a 51-year-old female with a past medical history of diabetes and a recent admission after a fall where she sustained anterior chest trauma who presents to the emergency department this morning with chest pain  The patient states that she has had pain since the fall in this location but she was woken up last night around 03:00 with severe pain  Patient states that she did not want to call the ambulance at that point and went back to sleep  She woke up this morning with continued severe pain in her chest and took two Tylenol before calling 911  Patient denies any nausea, vomiting, diaphoresis, shortness of breath, or radiation of the pain but does state that she was coughing last night when it occurred  Patient denies bringing anything up with the coughing  Patient was evaluated after the fall with a chest abdomen pelvis CT scan that did not show any traumatic injury  Although it did show that the patient's gallbladder was enlarged and a right upper quadrant ultrasound was recommended but never done  Patient had an echocardiogram done while she was in the hospital that was unremarkable  Patient denies any further trauma  Patient denies any nausea, vomiting, diarrhea, constipation, dysuria, hematuria, or any numbness, weakness, or tingling  Patient states that her last bowel movement was this morning and it was normal             Prior to Admission Medications   Prescriptions Last Dose Informant Patient Reported? Taking?    SITagliptin Phosphate (JANUVIA PO)  Self Yes No   Sig: Take by mouth daily Pt unsure dose    Timolol Maleate (TIMOPTIC OP)   Yes No   Sig: Administer 1 drop into the left eye daily at bedtime     carvedilol (COREG) 12 5 mg tablet   Yes No   Sig: Take 12 5 mg by mouth 2 (two) times a day with meals     glimepiride (AMARYL) 1 mg tablet   No No   Sig: Take 1 tablet (1 mg total) by mouth daily with breakfast   lisinopril (ZESTRIL) 5 mg tablet   Yes No   Sig: Take 5 mg by mouth   naproxen (NAPROSYN) 500 mg tablet   No No   Sig: Take 1 tablet (500 mg total) by mouth every 12 (twelve) hours as needed for mild pain or moderate pain   pravastatin (PRAVACHOL) 40 mg tablet   Yes No   Sig: Take 40 mg by mouth daily   prednisoLONE acetate (PRED FORTE) 1 % ophthalmic suspension   Yes No   Sig: Administer 1 % into the left eye daily        Facility-Administered Medications: None       Past Medical History:   Diagnosis Date    Diabetes mellitus ([facility] [address] )     Hypertension     Migraine        History reviewed  No pertinent surgical history  History reviewed  No pertinent family history  I have reviewed and agree with the history as documented  Social History   Substance Use Topics    Smoking status: Never Smoker    Smokeless tobacco: Never Used    Alcohol use No        Review of Systems   Constitutional: Negative for chills, fatigue and fever  HENT: Negative for congestion, rhinorrhea, sinus pressure and sore throat  Eyes: Negative for visual disturbance  Respiratory: Negative for cough and shortness of breath  Cardiovascular: Positive for chest pain  Gastrointestinal: Positive for abdominal pain (Epigastric)  Negative for constipation, diarrhea, nausea and vomiting  Genitourinary: Negative for dysuria, frequency, hematuria and urgency  Musculoskeletal: Negative for arthralgias and myalgias  Skin: Negative for color change and rash  Neurological: Negative for dizziness, light-headedness and numbness         Physical Exam  ED Triage Vitals   Temperature Pulse Respirations Blood Pressure SpO2   12/10/18 1115 12/10/18 1115 12/10/18 1115 12/10/18 1115 12/10/18 1115   98 3 °F (36 8 °C) 64 16 (!) 203/68 98 %      Temp Source Heart Rate Source Patient Position - Orthostatic VS BP Location FiO2 (%)   12/10/18 1720 12/10/18 1506 12/10/18 1506 12/10/18 1506 --   Oral Monitor Lying Right arm       Pain Score       12/10/18 1115       5           Orthostatic Vital Signs  Vitals:    12/11/18 0606 12/11/18 0700 12/11/18 1123 12/11/18 1220   BP: 140/62 170/75 (!) 198/76 148/52   Pulse:  70 68    Patient Position - Orthostatic VS:  Lying Lying Lying       Physical Exam   Constitutional: She is oriented to person, place, and time  She appears well-developed and well-nourished  No distress  HENT:   Head: Normocephalic and atraumatic  Eyes: Pupils are equal, round, and reactive to light  Conjunctivae and EOM are normal  Right eye exhibits no discharge  Left eye exhibits no discharge  No scleral icterus  Neck: Normal range of motion  Neck supple  No JVD present  Cardiovascular: Normal rate, regular rhythm and normal heart sounds  Exam reveals no gallop and no friction rub  No murmur heard  Pulmonary/Chest: Effort normal and breath sounds normal  No stridor  No respiratory distress  She has no wheezes  She has no rales  She exhibits tenderness  Abdominal: Soft  Bowel sounds are normal  She exhibits no distension  There is tenderness (Right upper quadrant and umbilical)  There is no guarding  Musculoskeletal: Normal range of motion  She exhibits no edema, tenderness or deformity  Neurological: She is alert and oriented to person, place, and time  No cranial nerve deficit or sensory deficit  She exhibits normal muscle tone  Skin: Skin is warm and dry  No rash noted  She is not diaphoretic  No erythema  No pallor  Venous stasis changes in bilateral lower extremities but no swelling or skin breakdown  Psychiatric: She has a normal mood and affect  Her behavior is normal    Nursing note and vitals reviewed        ED Medications  Medications   carvedilol (COREG) tablet 12 5 mg ( Oral Canceled Entry 12/11/18 0730)   lisinopril (ZESTRIL) tablet 5 mg (5 mg Oral Given 12/11/18 0848) pravastatin (PRAVACHOL) tablet 40 mg (not administered)   prednisoLONE acetate (PRED FORTE) 1 % ophthalmic suspension 1 drop (1 drop Left Eye Not Given 12/11/18 1006)   timolol (TIMOPTIC) 0 5 % ophthalmic solution 1 drop (1 drop Left Eye Given 12/11/18 1006)   enoxaparin (LOVENOX) subcutaneous injection 30 mg (30 mg Subcutaneous Given 12/11/18 0847)   acetaminophen (TYLENOL) tablet 650 mg (650 mg Oral Given 12/10/18 1938)   melatonin tablet 3 mg (3 mg Oral Given 12/10/18 2210)   insulin lispro (HumaLOG) 100 units/mL subcutaneous injection 1-5 Units (1 Units Subcutaneous Not Given 12/11/18 0618)   insulin lispro (HumaLOG) 100 units/mL subcutaneous injection 1-5 Units (1 Units Subcutaneous Given 12/10/18 2218)   famotidine (PEPCID) tablet 20 mg (20 mg Oral Given 12/10/18 2210)   sodium chloride 0 9 % infusion (0 mL/hr Intravenous Stopped 12/11/18 1100)   acetaminophen (TYLENOL) tablet 650 mg (650 mg Oral Given 12/10/18 1504)   aluminum-magnesium hydroxide-simethicone (MYLANTA) 200-200-20 mg/5 mL oral suspension 15 mL (15 mL Oral Given 12/11/18 0142)   lidocaine viscous (XYLOCAINE) 2 % mucosal solution 15 mL (15 mL Swish & Swallow Given 12/11/18 0141)   hydrALAZINE (APRESOLINE) injection 5 mg (5 mg Intravenous Given 12/11/18 1124)       Diagnostic Studies  Results Reviewed     Procedure Component Value Units Date/Time    Magnesium [[id number]]  (Normal) Collected:  12/10/18 1126    Lab Status:  Final result Specimen:  Blood from Arm, Left Updated:  12/10/18 1228     Magnesium 2 2 mg/dL     Lipase [[id number]]  (Abnormal) Collected:  12/10/18 1126    Lab Status:  Final result Specimen:  Blood from Arm, Left Updated:  12/10/18 1228     Lipase 849 (H) u/L     Troponin I [[id number]]  (Normal) Collected:  12/10/18 1126    Lab Status:  Final result Specimen:  Blood from Arm, Left Updated:  12/10/18 1217     Troponin I <0 02 ng/mL     Comprehensive metabolic panel [[id number]]  (Abnormal) Collected:  12/10/18 1126    Lab Status:  Final result Specimen:  Blood from Arm, Left Updated:  12/10/18 1212     Sodium 137 mmol/L      Potassium 4 1 mmol/L      Chloride 104 mmol/L      CO2 29 mmol/L      ANION GAP 4 mmol/L      BUN 16 mg/dL      Creatinine 1 23 mg/dL      Glucose 219 (H) mg/dL      Calcium 9 4 mg/dL      AST 13 U/L      ALT 12 U/L      Alkaline Phosphatase 157 (H) U/L      Total Protein 6 9 g/dL      Albumin 3 1 (L) g/dL      Total Bilirubin 0 53 mg/dL      eGFR 37 ml/min/1 73sq m     Narrative:         National Kidney Disease Education Program recommendations are as follows:  GFR calculation is accurate only with a steady state creatinine  Chronic Kidney disease less than 60 ml/min/1 73 sq  meters  Kidney failure less than 15 ml/min/1 73 sq  meters  CBC and differential [[id number]]  (Abnormal) Collected:  12/10/18 1126    Lab Status:  Final result Specimen:  Blood from Arm, Left Updated:  12/10/18 1137     WBC 10 49 (H) Thousand/uL      RBC 4 42 Million/uL      Hemoglobin 12 1 g/dL      Hematocrit 38 7 %      MCV 88 fL      MCH 27 4 pg      MCHC 31 3 (L) g/dL      RDW 13 5 %      MPV 9 4 fL      Platelets 190 Thousands/uL      nRBC 0 /100 WBCs      Neutrophils Relative 80 (H) %      Immat GRANS % 0 %      Lymphocytes Relative 11 (L) %      Monocytes Relative 8 %      Eosinophils Relative 1 %      Basophils Relative 0 %      Neutrophils Absolute 8 35 (H) Thousands/µL      Immature Grans Absolute 0 03 Thousand/uL      Lymphocytes Absolute 1 16 Thousands/µL      Monocytes Absolute 0 83 Thousand/µL      Eosinophils Absolute 0 08 Thousand/µL      Basophils Absolute 0 04 Thousands/µL                  XR chest 2 views   Final Result by Courtney Garcia MD (12/10 1625)      No acute cardiopulmonary disease  Workstation performed: [id number]         US gallbladder   Final Result by Ashlyn Rivera DO (12/10 1266)   Pancreatic tail obscured by overlying bowel gas  Unremarkable liver        Persistently distended gallbladder (since at least November 8) with layering echogenic bile or tiny stones/"sand" dependently  No large calcified stones visible  Negative sonographic Adams's sign  No secondary evidence of cholecystitis  Normal caliber biliary ducts  Minimal right medical renal disease  Workstation performed: [id number]               Procedures  ECG 12 Lead Documentation  Date/Time: 12/10/2018 1:13 PM  Performed by: Caroline Jaquez  Authorized by: Caroline Jaquez     Indications / Diagnosis:  Chest pain  ECG reviewed by me, the ED Provider: yes    Previous ECG:     Previous ECG:  Compared to current    Similarity:  No change    Comparison to cardiac monitor: Yes    Interpretation:     Interpretation: abnormal    Rate:     ECG rate assessment: normal    Rhythm:     Rhythm: sinus rhythm    Ectopy:     Ectopy: none    QRS:     QRS axis:  Left    QRS intervals:  Normal  Conduction:     Conduction: normal    ST segments:     ST segments:  Normal  T waves:     T waves: normal            Phone Consults  ED Phone Contact    ED Course         HEART Risk Score      Most Recent Value   History  1 Filed at: 12/10/2018 1606   ECG  0 Filed at: 12/10/2018 1606   Age  2 Filed at: 12/10/2018 1606   Risk Factors  1 Filed at: 12/10/2018 1606   Troponin  0 Filed at: 12/10/2018 1606   Heart Score Risk Calculator   History  1 Filed at: 12/10/2018 1606   ECG  0 Filed at: 12/10/2018 1606   Age  2 Filed at: 12/10/2018 1606   Risk Factors  1 Filed at: 12/10/2018 1606   Troponin  0 Filed at: 12/10/2018 1606   HEART Score  4 Filed at: 12/10/2018 1606   HEART Score  4 Filed at: 12/10/2018 1606        Identification of Seniors at Risk      Most Recent Value   (ISAR) Identification of Seniors at Risk   Before the illness or injury that brought you to the Emergency, did you need someone to help you on a regular basis?   0 Filed at: 12/10/2018 1118   In the last 24 hours, have you needed more help than usual?  0 Filed at: 12/10/2018 1118   Have you been hospitalized for one or more nights during the past 6 months? 1 Filed at: 12/10/2018 1118   In general, do you see well?  0 Filed at: 12/10/2018 1118   In general, do you have serious problems with your memory? 0 Filed at: 12/10/2018 1118   Do you take more than three different medications every day? 1 Filed at: 12/10/2018 1118   ISAR Score  2 Filed at: 12/10/2018 1118                  JAYA Risk Score      Most Recent Value   Age >= 72  1 Filed at: 12/10/2018 1642   Known CAD (stenosis >= 50%)  0 Filed at: 12/10/2018 1642   Recent (<=24 hrs) Service Angina  1 Filed at: 12/10/2018 1642   ST Deviation >= 0 5 mm  0 Filed at: 12/10/2018 1642   3+ CAD Risk Factors (FHx, HTN, HLP, DM, Smoker)  1 Filed at: 12/10/2018 1642   Aspirin Use Past 7 Days  0 Filed at: 12/10/2018 1642   Elevated Cardiac Markers  0 Filed at: 12/10/2018 1642   JAYA Risk Score (Calculated)  3 Filed at: 12/10/2018 1642              MDM  Number of Diagnoses or Management Options  Chest pain, atypical:   Hypertension:   Diagnosis management comments: Unable to give this patient contrast due to her decreased GFR and will forego the dissection study and check a chest x-ray instead  Patient's heart score currently a 4 and while her labwork is normal so far, will admit her to the hospital for ACS rule out  Spoke with [facility] who agreed to admit the patient to their service  CritCare Time    Disposition  Final diagnoses:   Chest pain, atypical   Hypertension     Time reflects when diagnosis was documented in both MDM as applicable and the Disposition within this note     Time User Action Codes Description Comment    12/10/2018  4:16 PM Chicho Alberts [R07 [id number]] Chest pain, atypical     12/10/2018  4:16 PM Chicho Alberts [I10] Hypertension     12/11/2018 10:51 AM Kelly Alberts [B46  XXXD] Fall, subsequent encounter       ED Disposition     ED Disposition Condition Comment    Admit  Case was discussed with [facility] and the patient's admission status was agreed to be Admission Status: observation status to the service of Dr Ted Silva   Follow-up Information     Follow up With Specialties Details Why Contact Heena Dietz DO  Schedule an appointment as soon as possible for a visit in 1 week(s)  [address] [id number] [phone]      [facility]  Follow up  [phone]          Discharge Medication List as of 12/11/2018 12:15 PM      START taking these medications    Details   acetaminophen (TYLENOL) 325 mg tablet Take 2 tablets (650 mg total) by mouth every 6 (six) hours as needed for mild pain, Starting Tue 12/11/2018, No Print         CONTINUE these medications which have NOT CHANGED    Details   carvedilol (COREG) 12 5 mg tablet Take 12 5 mg by mouth 2 (two) times a day with meals  , Historical Med      glimepiride (AMARYL) 1 mg tablet Take 1 tablet (1 mg total) by mouth daily with breakfast, Starting Mon 11/12/2018, Print      lisinopril (ZESTRIL) 5 mg tablet Take 5 mg by mouth, Historical Med      naproxen (NAPROSYN) 500 mg tablet Take 1 tablet (500 mg total) by mouth every 12 (twelve) hours as needed for mild pain or moderate pain, Starting Sun 12/2/2018, Print      pravastatin (PRAVACHOL) 40 mg tablet Take 40 mg by mouth daily, Historical Med      prednisoLONE acetate (PRED FORTE) 1 % ophthalmic suspension Administer 1 % into the left eye daily  , Starting Tue 2/10/2015, Historical Med      SITagliptin Phosphate (JANUVIA PO) Take by mouth daily Pt unsure dose , Historical Med      Timolol Maleate (TIMOPTIC OP) Administer 1 drop into the left eye daily at bedtime  , Historical Med             Outpatient Discharge Orders  Ambulatory Referral to Home Health   Standing Status: Future  Standing Exp   Date: 06/11/19     Discharge Diet     Activity as tolerated     Call provider for:  difficulty breathing, headache or visual disturbances     Call provider for:  severe uncontrolled pain     Call provider for:  persistent dizziness or light-headedness         ED Provider  Attending physically available and evaluated Raul Iyer I managed the patient along with the ED Attending      Electronically Signed by         Marilee Solomon MD  12/11/18 5237

## 2018-12-10 NOTE — H&P
H&P- Adrienne Null 5/2/1923, 80 y o  female MRN: [id number]    Unit/Bed#: ED 26 Encounter: [id number]    Primary Care Provider: Kobe Silva DO   Date and time admitted to hospital: 12/10/2018 11:07 AM        * Chest pain   Assessment & Plan    JAYA 3  Trop neg x 1 - check 2 more  Lexiscan nuclear stress test ordered  Recent echo unremarkable  RUQ US WNL  Pt says she is intolerant to ASA  Check FLP - on statin  Possibly GERD - give pepcid at night     Essential hypertension   Assessment & Plan    C/w home meds  Appears stable     Type 2 diabetes mellitus without complication, without long-term current use of insulin (HCC)   Assessment & Plan    Lab Results   Component Value Date    HGBA1C 9 7 (H) 11/10/2018       No results for input(s): POCGLU in the last 72 hours  Blood Sugar Average: Last 72 hrs:     Hold PO meds and give ISS       VTE Prophylaxis: Enoxaparin (Lovenox)  / sequential compression device   Code Status: DNR/DNI  POLST: POLST form is not discussed and not completed at this time  Discussion with family: yes    Anticipated Length of Stay:  Patient will be admitted on an Observation basis with an anticipated length of stay of  Less than 2 midnights  Justification for Hospital Stay: r/o ACS    Total Time for Visit, including Counseling / Coordination of Care: 45 minutes  Greater than 50% of this total time spent on direct patient counseling and coordination of care  Chief Complaint:   Feels like there's a brick in abdomen    History of Present Illness:    Adrienne Null is a 80 y o  female w/ DM, HTN, HLD who presents with on an off right sided epigastric/chest pain since falling off toilet 30 days ago  PT says she had severe pain at 3 AM today and then woke up with severe pain  Pain associated with nausea  Pt notices pain occurs at night when lying down  Fee4ls better during the day  No SOB or dizziness  Not sleeping well      Review of Systems:    Review of Systems Constitutional: Negative  HENT: Negative  Eyes: Negative  Respiratory: Negative  Cardiovascular: Positive for chest pain  Gastrointestinal: Positive for nausea  Endocrine: Negative  Genitourinary: Negative  Musculoskeletal: Negative  Skin: Negative  Allergic/Immunologic: Negative  Neurological: Negative  Hematological: Negative  Psychiatric/Behavioral: Negative  Past Medical and Surgical History:     Past Medical History:   Diagnosis Date    Diabetes mellitus ([facility] [address] )     Hypertension     Migraine        No past surgical history on file  Meds/Allergies:     Prior to Admission medications    Medication Sig Start Date End Date Taking? Authorizing Provider   carvedilol (COREG) 12 5 mg tablet Take 12 5 mg by mouth 2 (two) times a day with meals     Yes Historical Provider, MD   glimepiride (AMARYL) 1 mg tablet Take 1 tablet (1 mg total) by mouth daily with breakfast 11/12/18   SAMIRA Alexandra   lisinopril (ZESTRIL) 5 mg tablet Take 5 mg by mouth    Historical Provider, MD   naproxen (NAPROSYN) 500 mg tablet Take 1 tablet (500 mg total) by mouth every 12 (twelve) hours as needed for mild pain or moderate pain 12/2/18   Tolu Bailey DO   pravastatin (PRAVACHOL) 40 mg tablet Take 40 mg by mouth daily    Historical Provider, MD   prednisoLONE acetate (PRED FORTE) 1 % ophthalmic suspension Administer 1 % into the left eye daily   2/10/15   Historical Provider, MD   SITagliptin Phosphate (JANUVIA PO) Take by mouth daily Pt unsure dose     Historical Provider, MD   Timolol Maleate (TIMOPTIC OP) Administer 1 drop into the left eye daily at bedtime      Historical Provider, MD     I have reviewed home medications using allscripts  Allergies:    Allergies   Allergen Reactions    Atorvastatin Hives    Fentanyl Other (See Comments)    Hydrocodone-Acetaminophen GI Intolerance    Metronidazole Other (See Comments)    Penicillins Other (See Comments)    Sulfa Antibiotics Other (See Comments)     takes Amaryl @ home,takes Hyzaar @ home    Tramadol Other (See Comments)    Metoclopramide Rash    Oxycodone-Acetaminophen Rash       Social History:     Marital Status:      Substance Use History:   History   Alcohol Use No     History   Smoking Status    Never Smoker   Smokeless Tobacco    Never Used     History   Drug Use No       Family History:    No family history on file  Physical Exam:     Vitals:   Blood Pressure: 145/80 (12/10/18 1602)  Pulse: 67 (12/10/18 1602)  Temperature: 98 3 °F (36 8 °C) (12/10/18 1115)  Respirations: 18 (12/10/18 1602)  Weight - Scale: 67 2 kg (148 lb 2 4 oz) (12/10/18 1115)  SpO2: 96 % (12/10/18 1602)    Physical Exam   Constitutional: She is oriented to person, place, and time  No distress  HENT:   Head: Normocephalic and atraumatic  Eyes: Conjunctivae and EOM are normal    Neck: Normal range of motion  Neck supple  Cardiovascular: Normal rate and regular rhythm  Pulmonary/Chest: Effort normal and breath sounds normal  She has no wheezes  She has no rales  Abdominal: Soft  Bowel sounds are normal  She exhibits no distension  There is no tenderness  Musculoskeletal: Normal range of motion  She exhibits no edema  Neurological: She is alert and oriented to person, place, and time  Skin: Skin is warm and dry  She is not diaphoretic  Additional Data:     Lab Results: I have personally reviewed pertinent reports          Results from last 7 days  Lab Units 12/10/18  1126   WBC Thousand/uL 10 49*   HEMOGLOBIN g/dL 12 1   HEMATOCRIT % 38 7   PLATELETS Thousands/uL 304   NEUTROS PCT % 80*   LYMPHS PCT % 11*   MONOS PCT % 8   EOS PCT % 1       Results from last 7 days  Lab Units 12/10/18  1126   SODIUM mmol/L 137   POTASSIUM mmol/L 4 1   CHLORIDE mmol/L 104   CO2 mmol/L 29   BUN mg/dL 16   CREATININE mg/dL 1 23   ANION GAP mmol/L 4   CALCIUM mg/dL 9 4   ALBUMIN g/dL 3 1*   TOTAL BILIRUBIN mg/dL 0 53   ALK PHOS U/L 157*   ALT U/L 12   AST U/L 13   GLUCOSE RANDOM mg/dL 219*                       Imaging: I have personally reviewed pertinent reports  XR chest 2 views   Final Result by Vicente Ag MD (12/10 1625)      No acute cardiopulmonary disease  Workstation performed: [id number]         US gallbladder   Final Result by Maria Fernanda Pena DO (12/10 1926)   Pancreatic tail obscured by overlying bowel gas  Unremarkable liver  Persistently distended gallbladder (since at least November 8) with layering echogenic bile or tiny stones/"sand" dependently  No large calcified stones visible  Negative sonographic Adams's sign  No secondary evidence of cholecystitis  Normal caliber biliary ducts  Minimal right medical renal disease  Workstation performed: [id number]             EKG, Pathology, and Other Studies Reviewed on Admission:   · EKG: NSR - unchanged    Allscripts / Epic Records Reviewed: Yes     ** Please Note: This note has been constructed using a voice recognition system   **

## 2018-12-10 NOTE — ASSESSMENT & PLAN NOTE
JAYA 3  Trop neg x 1 - check 2 more  Lexiscan nuclear stress test ordered  Recent echo unremarkable  RUQ US WNL  Pt says she is intolerant to ASA  Check FLP - on statin  Possibly GERD - give pepcid at night

## 2018-12-10 NOTE — ASSESSMENT & PLAN NOTE
Lab Results   Component Value Date    HGBA1C 9 7 (H) 11/10/2018       No results for input(s): POCGLU in the last 72 hours      Blood Sugar Average: Last 72 hrs:     Hold PO meds and give ISS

## 2018-12-11 VITALS
TEMPERATURE: 98.4 F | BODY MASS INDEX: 26.05 KG/M2 | OXYGEN SATURATION: 97 % | SYSTOLIC BLOOD PRESSURE: 148 MMHG | HEIGHT: 63 IN | DIASTOLIC BLOOD PRESSURE: 52 MMHG | RESPIRATION RATE: 18 BRPM | WEIGHT: 147 LBS | HEART RATE: 68 BPM

## 2018-12-11 LAB
ANION GAP SERPL CALCULATED.3IONS-SCNC: 6 MMOL/L (ref 4–13)
BUN SERPL-MCNC: 17 MG/DL (ref 5–25)
CALCIUM SERPL-MCNC: 8.9 MG/DL (ref 8.3–10.1)
CHLORIDE SERPL-SCNC: 107 MMOL/L (ref 100–108)
CHOLEST SERPL-MCNC: 129 MG/DL (ref 50–200)
CO2 SERPL-SCNC: 28 MMOL/L (ref 21–32)
CREAT SERPL-MCNC: 1.05 MG/DL (ref 0.6–1.3)
ERYTHROCYTE [DISTWIDTH] IN BLOOD BY AUTOMATED COUNT: 13.6 % (ref 11.6–15.1)
GFR SERPL CREATININE-BSD FRML MDRD: 45 ML/MIN/1.73SQ M
GLUCOSE SERPL-MCNC: 113 MG/DL (ref 65–140)
GLUCOSE SERPL-MCNC: 118 MG/DL (ref 65–140)
HCT VFR BLD AUTO: 39.2 % (ref 34.8–46.1)
HDLC SERPL-MCNC: 47 MG/DL (ref 40–60)
HGB BLD-MCNC: 12.3 G/DL (ref 11.5–15.4)
LDLC SERPL CALC-MCNC: 58 MG/DL (ref 0–100)
MAGNESIUM SERPL-MCNC: 2.1 MG/DL (ref 1.6–2.6)
MCH RBC QN AUTO: 27.5 PG (ref 26.8–34.3)
MCHC RBC AUTO-ENTMCNC: 31.4 G/DL (ref 31.4–37.4)
MCV RBC AUTO: 88 FL (ref 82–98)
PLATELET # BLD AUTO: 313 THOUSANDS/UL (ref 149–390)
PMV BLD AUTO: 9.5 FL (ref 8.9–12.7)
POTASSIUM SERPL-SCNC: 3.8 MMOL/L (ref 3.5–5.3)
RBC # BLD AUTO: 4.47 MILLION/UL (ref 3.81–5.12)
SODIUM SERPL-SCNC: 141 MMOL/L (ref 136–145)
TRIGL SERPL-MCNC: 120 MG/DL
TROPONIN I SERPL-MCNC: <0.02 NG/ML
WBC # BLD AUTO: 8.37 THOUSAND/UL (ref 4.31–10.16)

## 2018-12-11 PROCEDURE — 83735 ASSAY OF MAGNESIUM: CPT | Performed by: GENERAL PRACTICE

## 2018-12-11 PROCEDURE — 85027 COMPLETE CBC AUTOMATED: CPT | Performed by: GENERAL PRACTICE

## 2018-12-11 PROCEDURE — 80061 LIPID PANEL: CPT | Performed by: GENERAL PRACTICE

## 2018-12-11 PROCEDURE — 80048 BASIC METABOLIC PNL TOTAL CA: CPT | Performed by: GENERAL PRACTICE

## 2018-12-11 PROCEDURE — 82948 REAGENT STRIP/BLOOD GLUCOSE: CPT

## 2018-12-11 PROCEDURE — 99217 PR OBSERVATION CARE DISCHARGE MANAGEMENT: CPT | Performed by: NURSE PRACTITIONER

## 2018-12-11 PROCEDURE — 93005 ELECTROCARDIOGRAM TRACING: CPT

## 2018-12-11 PROCEDURE — 84484 ASSAY OF TROPONIN QUANT: CPT | Performed by: PHYSICIAN ASSISTANT

## 2018-12-11 RX ORDER — ACETAMINOPHEN 325 MG/1
650 TABLET ORAL EVERY 6 HOURS PRN
Qty: 30 TABLET | Refills: 0
Start: 2018-12-11

## 2018-12-11 RX ORDER — MAGNESIUM HYDROXIDE/ALUMINUM HYDROXICE/SIMETHICONE 120; 1200; 1200 MG/30ML; MG/30ML; MG/30ML
15 SUSPENSION ORAL ONCE
Status: COMPLETED | OUTPATIENT
Start: 2018-12-11 | End: 2018-12-11

## 2018-12-11 RX ORDER — HYDRALAZINE HYDROCHLORIDE 20 MG/ML
5 INJECTION INTRAMUSCULAR; INTRAVENOUS ONCE
Status: COMPLETED | OUTPATIENT
Start: 2018-12-11 | End: 2018-12-11

## 2018-12-11 RX ORDER — SODIUM CHLORIDE 9 MG/ML
50 INJECTION, SOLUTION INTRAVENOUS CONTINUOUS
Status: DISPENSED | OUTPATIENT
Start: 2018-12-11 | End: 2018-12-11

## 2018-12-11 RX ADMIN — LIDOCAINE HYDROCHLORIDE 15 ML: 20 SOLUTION ORAL; TOPICAL at 01:41

## 2018-12-11 RX ADMIN — ENOXAPARIN SODIUM 30 MG: 30 INJECTION SUBCUTANEOUS at 08:47

## 2018-12-11 RX ADMIN — TIMOLOL MALEATE 1 DROP: 5 SOLUTION/ DROPS OPHTHALMIC at 10:06

## 2018-12-11 RX ADMIN — ALUMINUM HYDROXIDE, MAGNESIUM HYDROXIDE, AND SIMETHICONE 15 ML: 200; 200; 20 SUSPENSION ORAL at 01:42

## 2018-12-11 RX ADMIN — HYDRALAZINE HYDROCHLORIDE 5 MG: 20 INJECTION INTRAMUSCULAR; INTRAVENOUS at 11:24

## 2018-12-11 RX ADMIN — SODIUM CHLORIDE 50 ML/HR: 0.9 INJECTION, SOLUTION INTRAVENOUS at 08:00

## 2018-12-11 RX ADMIN — CARVEDILOL 12.5 MG: 12.5 TABLET, FILM COATED ORAL at 04:47

## 2018-12-11 RX ADMIN — LISINOPRIL 5 MG: 5 TABLET ORAL at 08:48

## 2018-12-11 NOTE — UTILIZATION REVIEW
PRIOR [facility] ADMISSION 11/8/18 - 11/13/18 - INPATIENT APPROVED PER PHYSICIAN ADVISOR          Initial Clinical Review    Admission: Date/Time/Statement: 12/10/19 AT 1624  OBSERVATION    Orders Placed This Encounter   Procedures    Place in Observation (expected length of stay for this patient is less than two midnights)     Standing Status:   Standing     Number of Occurrences:   1     Order Specific Question:   Admitting Physician     Answer:   Ary Alvarado [[id number]]     Order Specific Question:   Level of Care     Answer:   Med Surg [16]     ED: Date/Time/Mode of Arrival:   ED Arrival Information     Expected Arrival Acuity Means of Arrival Escorted By Service Admission Type    - 12/10/2018 11:06 Urgent Ambulance [facility] EMS Hospitalist Urgent    Arrival Complaint    chest pain          Chief Complaint:   Chief Complaint   Patient presents with    Chest Pain     substernal chest pain since falling off the toilet and hitting her chest on the bathtub 1 month ago  Pt has been seen for this multiple times  Chief Complaint:   Feels like there's a brick in abdomen     History of Present Illness:   Amauri Varela is a 80 y o  female w/ DM, HTN, HLD who presents with on an off right sided epigastric/chest pain since falling off toilet 30 days ago  PT says she had severe pain at 3 AM today and then woke up with severe pain  Pain associated with nausea  Pt notices pain occurs at night when lying down  Fee4ls better during the day  No SOB or dizziness  Not sleeping well        Review of Systems:  Constitutional: Negative  Respiratory: Negative  Cardiovascular: Positive for chest pain  Gastrointestinal: Positive for nausea         ED Vital Signs:   ED Triage Vitals   Temperature Pulse Respirations Blood Pressure SpO2   12/10/18 1115 12/10/18 1115 12/10/18 1115 12/10/18 1115 12/10/18 1115   98 3 °F (36 8 °C) 64 16 (!) 203/68 98 %      Temp Source Heart Rate Source Patient Position - Orthostatic VS BP Location FiO2 (%)   12/10/18 1720 12/10/18 1506 12/10/18 1506 12/10/18 1506 --   Oral Monitor Lying Right arm       Pain Score       12/10/18 1115       5        Wt Readings from Last 1 Encounters:   12/10/18 66 7 kg (147 lb)      12/10 0701  12/11 0700 12/11 0701  12/11 0925  Most Recent    Temperature (°F) 97 498 5    97 9 (36 6)    Pulse 5971    70    Respirations 1618    16    Blood Pressure 140/62211/85    170/75    SpO2 (%) 9498    96        LABSDiagnostic Test Results:   Results from last 7 days  Lab Units 12/10/18  1126   WBC Thousand/uL 10 49*   HEMOGLOBIN g/dL 12 1   HEMATOCRIT % 38 7   PLATELETS Thousands/uL 304   NEUTROS PCT % 80*   LYMPHS PCT % 11*   MONOS PCT % 8   EOS PCT % 1       Results from last 7 days  Lab Units 12/10/18  1126   SODIUM mmol/L 137   POTASSIUM mmol/L 4 1   CHLORIDE mmol/L 104   CO2 mmol/L 29   BUN mg/dL 16   CREATININE mg/dL 1 23   ANION GAP mmol/L 4   CALCIUM mg/dL 9 4   ALBUMIN g/dL 3 1*   TOTAL BILIRUBIN mg/dL 0 53   ALK PHOS U/L 157*   ALT U/L 12   AST U/L 13   GLUCOSE RANDOM mg/dL 219*      Lipase [[id number]] (Abnormal) Collected: 12/10/18 1126   Lab Status: Final result Specimen: Blood from Arm, Left Updated: 12/10/18 1228    Lipase 849 (H) 73 - 393 u/L        TROPONIN NEG X 3    EKG (Per H+P):  NSR;  Unchanged from prior      GALLBLADDER ULTRASOUND -  Pancreatic tail obscured by overlying bowel gas  Unremarkable liver  Persistently distended gallbladder (since at least November 8) with layering echogenic bile or tiny stones/"sand" dependently  No large calcified stones visible  Negative sonographic Adams's sign  No secondary evidence of cholecystitis    Minimal right medical renal disease      ED Treatment:   Medication Administration from 12/10/2018 1106 to 12/10/2018 1718       Date/Time Order Dose Route Action Action by Comments     12/10/2018 1504 acetaminophen (TYLENOL) tablet 650 mg 650 mg Oral Given Dina Penn RN           Past Medical/Surgical History: Active Ambulatory Problems     Diagnosis Date Noted    Fall 11/08/2018    Type 2 diabetes mellitus without complication, without long-term current use of insulin ([facility] [address] ) 11/09/2018    Essential hypertension 11/09/2018    Chest pain     Pneumonia due to infectious organism 11/12/2018     Resolved Ambulatory Problems     Diagnosis Date Noted    Syncope and collapse 11/09/2018    Syncope     Fever 11/11/2018     Past Medical History:   Diagnosis Date    Diabetes mellitus ([facility] [address] )     Hypertension     Migraine        Admitting Diagnosis: Chest pain [R07 9]  Hypertension [I10]  Chest pain, atypical [R07 89]    Age/Sex: 80 y o  female      Assessment/Plan:   * Chest pain   Assessment & Plan     JAYA 3  Trop neg x 1 - check 2 more  Lexiscan nuclear stress test ordered  Recent echo unremarkable  RUQ US WNL  Pt says she is intolerant to ASA  Check FLP - on statin  Possibly GERD - give pepcid at night      Essential hypertension   Assessment & Plan     C/w home meds  Appears stable      Type 2 diabetes mellitus without complication, without long-term current use of insulin ([facility])   Assessment & Plan             Lab Results   Component Value Date     HGBA1C 9 7 (H) 11/10/2018       Hold PO meds and give ISS         VTE Prophylaxis: Enoxaparin (Lovenox)  / sequential compression device     Code Status: DNR/DNI    Anticipated Length of Stay:  Patient will be admitted on an Observation basis with an anticipated length of stay of  Less than 2 midnights  Justification for Hospital Stay: r/o ACS      Admission Orders:  12/10/19 AT 1624  OBSERVATION  TELEMETRY  ST SEGMENT MONITORING  VS Q4HRS  SCD      Diet Cardiovascular; Stress Test (1 Meal);  Consistent Carbohydrate Diet Level 2 (5 carb servings/75 grams CHO/meal)      Continuous IV Infusions:   sodium chloride 50 mL/hr Last Rate: 50 mL/hr (12/11/18 0800)       Scheduled Meds:   Current Facility-Administered Medications:  acetaminophen 650 mg Oral Q6H PRN Levonne Mary, DO    carvedilol 12 5 mg Oral BID With Meals Levoyuliya Hernandez, DO    enoxaparin 30 mg Subcutaneous Daily Michelle Hernandez, DO    famotidine 20 mg Oral HS Levoyuliya Hernandez, DO    insulin lispro 1-5 Units Subcutaneous TID AC Ant Montes, DO    insulin lispro 1-5 Units Subcutaneous HS Levoyuliya Hernandez, DO    lisinopril 5 mg Oral Daily Michelle Hernandez, DO    melatonin 3 mg Oral HS Levoyuliya Hernandez, DO    pravastatin 40 mg Oral Daily With [facility], DO    prednisoLONE acetate 1 drop Left Eye Daily Levoyuliya Hernandez, DO    sodium chloride 50 mL/hr Intravenous Continuous Minal Simpler, PA-C Last Rate: 50 mL/hr (12/11/18 0800)   timolol 1 drop Left Eye HS Levojocelyne Mary, DO      PRN Meds:     acetaminophen    NUC MED - PERFUSION SPECT TEST

## 2018-12-11 NOTE — DISCHARGE SUMMARY
Discharge Summary - [facility] Internal Medicine    Patient Information: Abigail Kingsley 80 y o  female MRN: [id number]  Unit/Bed#: [facility] 214-02 Encounter: [id number]    Discharging Physician / Practitioner: SAMIRA Panda  PCP: Bing Santos DO  Admission Date: 12/10/2018  Discharge Date: 12/11/18    Reason for Admission: chest pain     Discharge Diagnoses:     Principal Problem (Resolved):    Chest pain  Active Problems:    Type 2 diabetes mellitus without complication, without long-term current use of insulin ([address] )    Essential hypertension      Consultations During Hospital Stay:  · None    Procedures Performed:     · None    Significant Findings / Test Results:     · Chest x-ray: No acute cardiopulmonary disease  · RUQ US: Pancreatic tail obscured by overlying bowel gas  Unremarkable liver  Persistently distended gallbladder (since at least November 8) with layering echogenic bile or tiny stones/"sand" dependently  No large calcified stones visible  Negative sonographic Adams's sign  No secondary evidence of cholecystitis  Normal caliber biliary ducts  Minimal right medical renal disease  ·  EKG normal sinus rhythm no significant change when compared to prior  · Troponins x4 negative    Incidental Findings:   · None    Test Results Pending at Discharge (will require follow up): · None     Outpatient Tests Requested:  · Follow-up with PCP within 1 week of discharge    Complications:  None    Hospital Course:     Abigail Kingsley is a 80 y o  female patient with a past medical history of DM, HTN, HLD who originally presented to the hospital on 12/10/2018 due to intermittent right-sided epigastric/chest pain since falling off her toilet at home 3-4 weeks ago  Given her JAYA score of 3, inpatient stress test was recommended  Patient went down to the Department undergo nuclear stress test and refused    When speaking with her she states given her age she would really rather not now and does not want any further testing as she would not pursue any procedures or any other workup for her heart  She does not feel that her pain is secondary to her heart but rather musculoskeletal after falling off the toilet several weeks ago  Serial troponins x4 were negative  EKG was without ischemic changes  Patient has not had any recurrence of chest pain  Of note, her blood pressure was elevated on admission, this did improve until now her blood pressure is slightly elevated  I suspect that this is secondary to her being slightly worked up regarding initial plan for stress test and further inpatient workup  No medication adjustments will be made at this time  Discussed with daughter Breanna Mancilla over the phone  She is in agreement with plan and does not want any further testing or workup pursued given her advanced age  The patient does live home alone  I am concerned about the patient being home alone  I did discuss the possibility of obtaining a PT/OT consult however the patient and her daughter refused  She states that she had a bad experience with rehab and they would not wish to send her back to a facility  She does have visiting nurses in place as well as home therapy  I did speak with case management to ensure that the services will continue at discharge  Patient should follow up with her primary care provider  Condition at Discharge: stable     Discharge Day Visit / Exam:     Subjective:  Patient offers no complaints  She denies any pain or shortness of breath  She does have some chronic balance issues she tells me secondary to prior hip surgery  She wants to go home  She does not wish to pursue any additional workup given her age      Vitals: Blood Pressure: 170/75 (12/11/18 0700)  Pulse: 70 (12/11/18 0700)  Temperature: 97 9 °F (36 6 °C) (12/11/18 0700)  Temp Source: Oral (12/11/18 0700)  Respirations: 16 (12/11/18 0700)  Height: 5' 3" (160 cm) (12/10/18 1720)  Weight - Scale: 66 7 kg (147 lb) (12/10/18 1720)  SpO2: 96 % (12/11/18 0700)     Exam:   Physical Exam   Constitutional: She is oriented to person, place, and time  No distress  Pleasant elderly appearing female   HENT:   Head: Normocephalic  Eyes: Pupils are equal, round, and reactive to light  Neck: Normal range of motion  Cardiovascular: Normal rate, regular rhythm and intact distal pulses  No murmur heard  Pulmonary/Chest: Effort normal and breath sounds normal    Abdominal: Soft  Bowel sounds are normal    Musculoskeletal: Normal range of motion  She exhibits no edema  Neurological: She is alert and oriented to person, place, and time  Skin: Skin is warm and dry  Psychiatric: She has a normal mood and affect  Nursing note and vitals reviewed  Discussion with Family:  Patient and daughter over the phone    Discharge instructions/Information to patient and family:   See after visit summary for information provided to patient and family  Provisions for Follow-Up Care:  See after visit summary for information related to follow-up care and any pertinent home health orders  Disposition:     Home with VNA Services (Reminder: Complete face to face encounter)    For Discharges to [facility]:   · Not Applicable to this Patient - Not Applicable to this Patient    Planned Readmission: no     Discharge Statement:  I spent 45 minutes discharging the patient  This time was spent on the day of discharge  I had direct contact with the patient on the day of discharge  Greater than 50% of the total time was spent examining patient, answering all patient questions, arranging and discussing plan of care with patient as well as directly providing post-discharge instructions  Additional time then spent on discharge activities  Discharge Medications:  See after visit summary for reconciled discharge medications provided to patient and family        ** Please Note: This note has been constructed using a voice recognition system **

## 2018-12-11 NOTE — SOCIAL WORK
DC Plan:     Pt written for discharge  ENID notified by LAMONT Zamarripa that the patient has refused the cardiac stress test; the patient and family have requested discharge home with resumption of VNA  Family reported that the patient has [facility] services - [facility] sent per family request  ENID entered VNA information into [employer] and the AVS  LAMONT Zamarripa advised

## 2018-12-11 NOTE — PROGRESS NOTES
At [facility], pt stated that she had 8/10 pain located under her Left breast/chest area  VSS  [place] with [facility] paged  EKG obtained  [place] and [place] with [facility] at bedside to evaluate

## 2018-12-11 NOTE — QUICK NOTE
Patient complaining of burning epigastric pain that is a 9/10  She says this is the same pain that brought her in to ED yesterday  Troponin (-) x 3 on 12/09  Nothing has made it better but laying flat makes the pain worse  Eating does not change the pain  Exam: VSS  No changes from previous EKG  HRRR  Lungs CTA bilaterally  Abdomen is non tender to palpation, not distended, and bowel sounds present      - Consider gastritis vs reflux, vs ulcer  Order GI cocktail and monitor symptoms  If improvement, consider adding TID carafate  Possible outpatient GI follow up for further workup  - Less likely cardiac given negative troponin yesterday and no changes to EKG  Consider ordering troponin if no relief with GI cocktail

## 2018-12-12 LAB
ATRIAL RATE: 62 BPM
ATRIAL RATE: 64 BPM
ATRIAL RATE: 65 BPM
P AXIS: 25 DEGREES
P AXIS: 25 DEGREES
P AXIS: 28 DEGREES
P AXIS: 47 DEGREES
P AXIS: 53 DEGREES
PR INTERVAL: 182 MS
PR INTERVAL: 184 MS
PR INTERVAL: 184 MS
PR INTERVAL: 186 MS
PR INTERVAL: 186 MS
QRS AXIS: -16 DEGREES
QRS AXIS: -30 DEGREES
QRS AXIS: -32 DEGREES
QRSD INTERVAL: 100 MS
QRSD INTERVAL: 98 MS
QT INTERVAL: 416 MS
QT INTERVAL: 418 MS
QT INTERVAL: 424 MS
QT INTERVAL: 426 MS
QT INTERVAL: 426 MS
QTC INTERVAL: 429 MS
QTC INTERVAL: 430 MS
QTC INTERVAL: 434 MS
QTC INTERVAL: 439 MS
QTC INTERVAL: 439 MS
T WAVE AXIS: 29 DEGREES
T WAVE AXIS: 29 DEGREES
T WAVE AXIS: 32 DEGREES
T WAVE AXIS: 43 DEGREES
T WAVE AXIS: 47 DEGREES
VENTRICULAR RATE: 62 BPM
VENTRICULAR RATE: 64 BPM
VENTRICULAR RATE: 65 BPM

## 2018-12-12 PROCEDURE — 93010 ELECTROCARDIOGRAM REPORT: CPT | Performed by: INTERNAL MEDICINE

## 2019-05-29 ENCOUNTER — OFFICE VISIT (OUTPATIENT)
Dept: PODIATRY | Facility: CLINIC | Age: 84
End: 2019-05-29
Payer: MEDICARE

## 2019-05-29 VITALS
SYSTOLIC BLOOD PRESSURE: 146 MMHG | DIASTOLIC BLOOD PRESSURE: 91 MMHG | HEIGHT: 63 IN | WEIGHT: 147 LBS | BODY MASS INDEX: 26.05 KG/M2

## 2019-05-29 DIAGNOSIS — E11.9 CONTROLLED TYPE 2 DIABETES MELLITUS WITHOUT COMPLICATION, WITHOUT LONG-TERM CURRENT USE OF INSULIN (HCC): Primary | ICD-10-CM

## 2019-05-29 PROCEDURE — 1124F ACP DISCUSS-NO DSCNMKR DOCD: CPT | Performed by: PODIATRIST

## 2019-05-29 PROCEDURE — 99213 OFFICE O/P EST LOW 20 MIN: CPT | Performed by: PODIATRIST

## 2019-05-29 RX ORDER — MECLIZINE HCL 12.5 MG/1
12.5 TABLET ORAL 2 TIMES DAILY
Refills: 0 | COMMUNITY
Start: 2019-03-22

## 2019-05-29 RX ORDER — TIMOLOL MALEATE 6.8 MG/ML
SOLUTION/ DROPS OPHTHALMIC
Refills: 4 | COMMUNITY
Start: 2019-04-13 | End: 2019-05-29 | Stop reason: SDUPTHER

## 2019-05-29 RX ORDER — NEPAFENAC 0.3 %
SUSPENSION, DROPS(FINAL DOSAGE FORM)(ML) OPHTHALMIC (EYE)
Refills: 1 | COMMUNITY
Start: 2019-03-25 | End: 2021-03-23 | Stop reason: ALTCHOICE

## 2019-05-29 RX ORDER — LISINOPRIL 10 MG/1
10 TABLET ORAL DAILY
Refills: 5 | COMMUNITY
Start: 2019-05-11 | End: 2019-05-29 | Stop reason: SDUPTHER

## 2019-05-29 RX ORDER — TIMOLO/BRIMON/DORZO/LATANOP/PF 0.5-.15-2%
0.5 DROPS OPHTHALMIC (EYE)
COMMUNITY
Start: 2015-02-10 | End: 2019-05-29 | Stop reason: SDUPTHER

## 2019-08-07 ENCOUNTER — OFFICE VISIT (OUTPATIENT)
Dept: PODIATRY | Facility: CLINIC | Age: 84
End: 2019-08-07
Payer: MEDICARE

## 2019-08-07 VITALS
WEIGHT: 146.9 LBS | HEIGHT: 63 IN | BODY MASS INDEX: 26.03 KG/M2 | SYSTOLIC BLOOD PRESSURE: 140 MMHG | DIASTOLIC BLOOD PRESSURE: 90 MMHG

## 2019-08-07 DIAGNOSIS — L84 CORNS: ICD-10-CM

## 2019-08-07 DIAGNOSIS — E11.42 DIABETIC POLYNEUROPATHY ASSOCIATED WITH TYPE 2 DIABETES MELLITUS (HCC): Primary | ICD-10-CM

## 2019-08-07 PROCEDURE — 11055 PARING/CUTG B9 HYPRKER LES 1: CPT | Performed by: PODIATRIST

## 2019-08-07 PROCEDURE — 11719 TRIM NAIL(S) ANY NUMBER: CPT | Performed by: PODIATRIST

## 2019-08-07 RX ORDER — TIMOLOL MALEATE 6.8 MG/ML
SOLUTION/ DROPS OPHTHALMIC
Refills: 2 | COMMUNITY
Start: 2019-07-29 | End: 2019-08-07 | Stop reason: SDUPTHER

## 2019-08-07 NOTE — PROGRESS NOTES
Established patient with class findings presents for nail and lesion care  Vascular exam:  DP  0/4 bilateral; PT  0 4 bilateral   Dermatological exam:  Each toenail is thick and  Dystrophic  Pinch callus right great toe  Diagnosis:  Diabetes mellitus; hyperkeratotic lesion right great toe  Treatment: Trimmed multiple dystrophic toenails  Trimmed hyperkeratotic lesion right great toeNail removal  Date/Time: 8/7/2019 3:31 PM  Performed by: Ariel Parsons DPM  Authorized by: Ariel Parsosn DPM     Patient location:  Clinic  Anesthesia (see MAR for exact dosages): Anesthesia method:  None  Nails trimmed:     Number of nails trimmed:  10  Post-procedure details:     Patient tolerance of procedure:   Tolerated well, no immediate complications  Lesion Destruction  Date/Time: 8/7/2019 3:31 PM  Performed by: Ariel Parsons DPM  Authorized by: Ariel Parsons DPM     Procedure Details - Lesion Destruction:     Number of Lesions:  2  Lesion 1:     Body area:  Lower extremity    Lower extremity location:  R big toe    Malignancy: benign hyperkeratotic lesion      Destruction method: scissors used for extraction    Lesion 2:     Body area:  Lower extremity    Lower extremity location:  R second toe    Malignancy: benign hyperkeratotic lesion      Destruction method: surgical removal

## 2019-10-11 ENCOUNTER — APPOINTMENT (EMERGENCY)
Dept: RADIOLOGY | Facility: HOSPITAL | Age: 84
DRG: 305 | End: 2019-10-11
Payer: MEDICARE

## 2019-10-11 ENCOUNTER — HOSPITAL ENCOUNTER (INPATIENT)
Facility: HOSPITAL | Age: 84
LOS: 2 days | Discharge: HOME | DRG: 305 | End: 2019-10-13
Attending: EMERGENCY MEDICINE | Admitting: FAMILY MEDICINE
Payer: MEDICARE

## 2019-10-11 ENCOUNTER — APPOINTMENT (EMERGENCY)
Dept: CT IMAGING | Facility: HOSPITAL | Age: 84
DRG: 305 | End: 2019-10-11
Payer: MEDICARE

## 2019-10-11 DIAGNOSIS — R42 LIGHTHEADED: ICD-10-CM

## 2019-10-11 DIAGNOSIS — I16.0 HYPERTENSIVE URGENCY: ICD-10-CM

## 2019-10-11 DIAGNOSIS — R06.00 DYSPNEA: ICD-10-CM

## 2019-10-11 DIAGNOSIS — I10 ESSENTIAL HYPERTENSION: Chronic | ICD-10-CM

## 2019-10-11 DIAGNOSIS — R07.9 CHEST PAIN: Primary | ICD-10-CM

## 2019-10-11 PROBLEM — R06.02 SOB (SHORTNESS OF BREATH): Status: ACTIVE | Noted: 2019-10-11

## 2019-10-11 PROBLEM — D32.9 MENINGIOMA (HCC): Status: ACTIVE | Noted: 2019-10-11

## 2019-10-11 PROBLEM — N17.9 ACUTE RENAL FAILURE (ARF) (HCC): Status: ACTIVE | Noted: 2019-10-11

## 2019-10-11 LAB
ALBUMIN SERPL BCP-MCNC: 3.4 G/DL (ref 3.5–5)
ALP SERPL-CCNC: 112 U/L (ref 46–116)
ALT SERPL W P-5'-P-CCNC: 9 U/L (ref 12–78)
ANION GAP SERPL CALCULATED.3IONS-SCNC: 4 MMOL/L (ref 4–13)
AST SERPL W P-5'-P-CCNC: 12 U/L (ref 5–45)
BASOPHILS # BLD AUTO: 0.07 THOUSANDS/ΜL (ref 0–0.1)
BASOPHILS NFR BLD AUTO: 1 % (ref 0–1)
BILIRUB SERPL-MCNC: 0.44 MG/DL (ref 0.2–1)
BUN SERPL-MCNC: 22 MG/DL (ref 5–25)
CALCIUM SERPL-MCNC: 9.1 MG/DL (ref 8.3–10.1)
CHLORIDE SERPL-SCNC: 103 MMOL/L (ref 100–108)
CO2 SERPL-SCNC: 32 MMOL/L (ref 21–32)
CREAT SERPL-MCNC: 1.31 MG/DL (ref 0.6–1.3)
EOSINOPHIL # BLD AUTO: 0.11 THOUSAND/ΜL (ref 0–0.61)
EOSINOPHIL NFR BLD AUTO: 2 % (ref 0–6)
ERYTHROCYTE [DISTWIDTH] IN BLOOD BY AUTOMATED COUNT: 13.3 % (ref 11.6–15.1)
GFR SERPL CREATININE-BSD FRML MDRD: 34 ML/MIN/1.73SQ M
GLUCOSE SERPL-MCNC: 188 MG/DL (ref 65–140)
GLUCOSE SERPL-MCNC: 212 MG/DL (ref 65–140)
GLUCOSE SERPL-MCNC: 355 MG/DL (ref 65–140)
HCT VFR BLD AUTO: 43.6 % (ref 34.8–46.1)
HGB BLD-MCNC: 13.4 G/DL (ref 11.5–15.4)
IMM GRANULOCYTES # BLD AUTO: 0.01 THOUSAND/UL (ref 0–0.2)
IMM GRANULOCYTES NFR BLD AUTO: 0 % (ref 0–2)
LYMPHOCYTES # BLD AUTO: 1.3 THOUSANDS/ΜL (ref 0.6–4.47)
LYMPHOCYTES NFR BLD AUTO: 19 % (ref 14–44)
MCH RBC QN AUTO: 28 PG (ref 26.8–34.3)
MCHC RBC AUTO-ENTMCNC: 30.7 G/DL (ref 31.4–37.4)
MCV RBC AUTO: 91 FL (ref 82–98)
MONOCYTES # BLD AUTO: 0.61 THOUSAND/ΜL (ref 0.17–1.22)
MONOCYTES NFR BLD AUTO: 9 % (ref 4–12)
NEUTROPHILS # BLD AUTO: 4.75 THOUSANDS/ΜL (ref 1.85–7.62)
NEUTS SEG NFR BLD AUTO: 69 % (ref 43–75)
NRBC BLD AUTO-RTO: 0 /100 WBCS
PLATELET # BLD AUTO: 491 THOUSANDS/UL (ref 149–390)
PMV BLD AUTO: 9.7 FL (ref 8.9–12.7)
POTASSIUM SERPL-SCNC: 4.3 MMOL/L (ref 3.5–5.3)
PROT SERPL-MCNC: 7.3 G/DL (ref 6.4–8.2)
RBC # BLD AUTO: 4.79 MILLION/UL (ref 3.81–5.12)
SODIUM SERPL-SCNC: 139 MMOL/L (ref 136–145)
TROPONIN I SERPL-MCNC: <0.02 NG/ML
WBC # BLD AUTO: 6.85 THOUSAND/UL (ref 4.31–10.16)

## 2019-10-11 PROCEDURE — 96374 THER/PROPH/DIAG INJ IV PUSH: CPT

## 2019-10-11 PROCEDURE — 36415 COLL VENOUS BLD VENIPUNCTURE: CPT | Performed by: EMERGENCY MEDICINE

## 2019-10-11 PROCEDURE — 82948 REAGENT STRIP/BLOOD GLUCOSE: CPT

## 2019-10-11 PROCEDURE — 84484 ASSAY OF TROPONIN QUANT: CPT | Performed by: EMERGENCY MEDICINE

## 2019-10-11 PROCEDURE — 93005 ELECTROCARDIOGRAM TRACING: CPT

## 2019-10-11 PROCEDURE — 99223 1ST HOSP IP/OBS HIGH 75: CPT | Performed by: FAMILY MEDICINE

## 2019-10-11 PROCEDURE — 99285 EMERGENCY DEPT VISIT HI MDM: CPT

## 2019-10-11 PROCEDURE — 71046 X-RAY EXAM CHEST 2 VIEWS: CPT

## 2019-10-11 PROCEDURE — 99291 CRITICAL CARE FIRST HOUR: CPT | Performed by: EMERGENCY MEDICINE

## 2019-10-11 PROCEDURE — 80053 COMPREHEN METABOLIC PANEL: CPT | Performed by: EMERGENCY MEDICINE

## 2019-10-11 PROCEDURE — 70450 CT HEAD/BRAIN W/O DYE: CPT

## 2019-10-11 PROCEDURE — 85025 COMPLETE CBC W/AUTO DIFF WBC: CPT | Performed by: EMERGENCY MEDICINE

## 2019-10-11 RX ORDER — HEPARIN SODIUM 5000 [USP'U]/ML
5000 INJECTION, SOLUTION INTRAVENOUS; SUBCUTANEOUS EVERY 8 HOURS SCHEDULED
Status: DISCONTINUED | OUTPATIENT
Start: 2019-10-11 | End: 2019-10-13 | Stop reason: HOSPADM

## 2019-10-11 RX ORDER — LISINOPRIL 20 MG/1
20 TABLET ORAL DAILY
Status: CANCELLED | OUTPATIENT
Start: 2019-10-12

## 2019-10-11 RX ORDER — GLIMEPIRIDE 2 MG/1
1 TABLET ORAL
Status: DISCONTINUED | OUTPATIENT
Start: 2019-10-11 | End: 2019-10-13 | Stop reason: HOSPADM

## 2019-10-11 RX ORDER — SODIUM CHLORIDE AND POTASSIUM CHLORIDE .9; .15 G/100ML; G/100ML
50 SOLUTION INTRAVENOUS CONTINUOUS
Status: DISCONTINUED | OUTPATIENT
Start: 2019-10-11 | End: 2019-10-13

## 2019-10-11 RX ORDER — ACETAMINOPHEN 325 MG/1
650 TABLET ORAL EVERY 6 HOURS PRN
Status: DISCONTINUED | OUTPATIENT
Start: 2019-10-11 | End: 2019-10-13 | Stop reason: HOSPADM

## 2019-10-11 RX ORDER — PREDNISOLONE ACETATE 10 MG/ML
1 SUSPENSION/ DROPS OPHTHALMIC DAILY
Status: DISCONTINUED | OUTPATIENT
Start: 2019-10-12 | End: 2019-10-13 | Stop reason: HOSPADM

## 2019-10-11 RX ORDER — ONDANSETRON 2 MG/ML
4 INJECTION INTRAMUSCULAR; INTRAVENOUS EVERY 6 HOURS PRN
Status: DISCONTINUED | OUTPATIENT
Start: 2019-10-11 | End: 2019-10-13 | Stop reason: HOSPADM

## 2019-10-11 RX ORDER — LANOLIN ALCOHOL/MO/W.PET/CERES
3 CREAM (GRAM) TOPICAL
Status: DISCONTINUED | OUTPATIENT
Start: 2019-10-11 | End: 2019-10-13 | Stop reason: HOSPADM

## 2019-10-11 RX ORDER — HYDRALAZINE HYDROCHLORIDE 20 MG/ML
10 INJECTION INTRAMUSCULAR; INTRAVENOUS EVERY 6 HOURS PRN
Status: DISCONTINUED | OUTPATIENT
Start: 2019-10-11 | End: 2019-10-13 | Stop reason: HOSPADM

## 2019-10-11 RX ORDER — CARVEDILOL 6.25 MG/1
12.5 TABLET ORAL 2 TIMES DAILY WITH MEALS
Status: DISCONTINUED | OUTPATIENT
Start: 2019-10-11 | End: 2019-10-13 | Stop reason: HOSPADM

## 2019-10-11 RX ORDER — HYDRALAZINE HYDROCHLORIDE 20 MG/ML
10 INJECTION INTRAMUSCULAR; INTRAVENOUS ONCE
Status: COMPLETED | OUTPATIENT
Start: 2019-10-11 | End: 2019-10-11

## 2019-10-11 RX ORDER — LISINOPRIL 5 MG/1
5 TABLET ORAL ONCE
Status: COMPLETED | OUTPATIENT
Start: 2019-10-11 | End: 2019-10-11

## 2019-10-11 RX ORDER — LABETALOL 20 MG/4 ML (5 MG/ML) INTRAVENOUS SYRINGE
10 ONCE
Status: COMPLETED | OUTPATIENT
Start: 2019-10-11 | End: 2019-10-11

## 2019-10-11 RX ORDER — PRAVASTATIN SODIUM 40 MG
40 TABLET ORAL DAILY
Status: CANCELLED | OUTPATIENT
Start: 2019-10-12

## 2019-10-11 RX ORDER — MECLIZINE HCL 12.5 MG/1
12.5 TABLET ORAL 2 TIMES DAILY
Status: DISCONTINUED | OUTPATIENT
Start: 2019-10-11 | End: 2019-10-13 | Stop reason: HOSPADM

## 2019-10-11 RX ORDER — LISINOPRIL 10 MG/1
10 TABLET ORAL DAILY
Status: DISCONTINUED | OUTPATIENT
Start: 2019-10-12 | End: 2019-10-13 | Stop reason: HOSPADM

## 2019-10-11 RX ADMIN — MECLIZINE 12.5 MG: 12.5 TABLET ORAL at 17:59

## 2019-10-11 RX ADMIN — LISINOPRIL 5 MG: 5 TABLET ORAL at 16:45

## 2019-10-11 RX ADMIN — CARVEDILOL 12.5 MG: 6.25 TABLET, FILM COATED ORAL at 17:58

## 2019-10-11 RX ADMIN — HEPARIN SODIUM 5000 UNITS: 5000 INJECTION INTRAVENOUS; SUBCUTANEOUS at 21:59

## 2019-10-11 RX ADMIN — LABETALOL 20 MG/4 ML (5 MG/ML) INTRAVENOUS SYRINGE 10 MG: at 13:01

## 2019-10-11 RX ADMIN — MELATONIN 3 MG: 3 TAB ORAL at 21:59

## 2019-10-11 RX ADMIN — SODIUM CHLORIDE AND POTASSIUM CHLORIDE 50 ML/HR: .9; .15 SOLUTION INTRAVENOUS at 17:58

## 2019-10-11 RX ADMIN — TIMOLOL MALEATE 1 DROP: 2.5 SOLUTION OPHTHALMIC at 21:59

## 2019-10-11 RX ADMIN — ACETAMINOPHEN 650 MG: 325 TABLET ORAL at 16:55

## 2019-10-11 RX ADMIN — HYDRALAZINE HYDROCHLORIDE 10 MG: 20 INJECTION INTRAMUSCULAR; INTRAVENOUS at 15:15

## 2019-10-11 RX ADMIN — SODIUM CHLORIDE 500 ML: 0.9 INJECTION, SOLUTION INTRAVENOUS at 15:57

## 2019-10-11 NOTE — PLAN OF CARE
Problem: PAIN - ADULT  Goal: Verbalizes/displays adequate comfort level or baseline comfort level  Description  Interventions:  - Encourage patient to monitor pain and request assistance  - Assess pain using appropriate pain scale  - Administer analgesics based on type and severity of pain and evaluate response  - Implement non-pharmacological measures as appropriate and evaluate response  - Consider cultural and social influences on pain and pain management  - Notify physician/advanced practitioner if interventions unsuccessful or patient reports new pain  Outcome: Progressing     Problem: INFECTION - ADULT  Goal: Absence or prevention of progression during hospitalization  Description  INTERVENTIONS:  - Assess and monitor for signs and symptoms of infection  - Monitor lab/diagnostic results  - Monitor all insertion sites, i e  indwelling lines, tubes, and drains  - Monitor endotracheal if appropriate and nasal secretions for changes in amount and color  - [place] appropriate cooling/warming therapies per order  - Administer medications as ordered  - Instruct and encourage patient and family to use good hand hygiene technique  - Identify and instruct in appropriate isolation precautions for identified infection/condition  Outcome: Progressing     Problem: SAFETY ADULT  Goal: Patient will remain free of falls  Description  INTERVENTIONS:  - Assess patient frequently for physical needs  -  Identify cognitive and physical deficits and behaviors that affect risk of falls    -  [place] fall precautions as indicated by assessment   - Educate patient/family on patient safety including physical limitations  - Instruct patient to call for assistance with activity based on assessment  - Modify environment to reduce risk of injury  - Consider OT/PT consult to assist with strengthening/mobility  Outcome: Progressing  Goal: Maintain or return to baseline ADL function  Description  INTERVENTIONS:  -  Assess patient's ability to carry out ADLs; assess patient's baseline for ADL function and identify physical deficits which impact ability to perform ADLs (bathing, care of mouth/teeth, toileting, grooming, dressing, etc )  - Assess/evaluate cause of self-care deficits   - Assess range of motion  - Assess patient's mobility; develop plan if impaired  - Assess patient's need for assistive devices and provide as appropriate  - Encourage maximum independence but intervene and supervise when necessary  - Involve family in performance of ADLs  - Assess for home care needs following discharge   - Consider OT consult to assist with ADL evaluation and planning for discharge  - Provide patient education as appropriate  Outcome: Progressing  Goal: Maintain or return mobility status to optimal level  Description  INTERVENTIONS:  - Assess patient's baseline mobility status (ambulation, transfers, stairs, etc )    - Identify cognitive and physical deficits and behaviors that affect mobility  - Identify mobility aids required to assist with transfers and/or ambulation (gait belt, sit-to-stand, lift, walker, cane, etc )  - [place] fall precautions as indicated by assessment  - Record patient progress and toleration of activity level on Mobility SBAR; progress patient to next Phase/Stage  - Instruct patient to call for assistance with activity based on assessment  - Consider rehabilitation consult to assist with strengthening/weightbearing, etc   Outcome: Progressing     Problem: DISCHARGE PLANNING  Goal: Discharge to home or other facility with appropriate resources  Description  INTERVENTIONS:  - Identify barriers to discharge w/patient and caregiver  - Arrange for needed discharge resources and transportation as appropriate  - Identify discharge learning needs (meds, wound care, etc )  - Arrange for interpretive services to assist at discharge as needed  - Refer to Case Management Department for coordinating discharge planning if the patient needs post-hospital services based on physician/advanced practitioner order or complex needs related to functional status, cognitive ability, or social support system  Outcome: Progressing     Problem: Knowledge Deficit  Goal: Patient/family/caregiver demonstrates understanding of disease process, treatment plan, medications, and discharge instructions  Description  Complete learning assessment and assess knowledge base    Interventions:  - Provide teaching at level of understanding  - Provide teaching via preferred learning methods  Outcome: Progressing

## 2019-10-11 NOTE — ASSESSMENT & PLAN NOTE
Ct head demonstrates 10 x 8 mm extra-axial lesion in the frontal left parafalcine region, stable likely representing meningioma Stable from 2018

## 2019-10-11 NOTE — ASSESSMENT & PLAN NOTE
Patient w/sob and dizziness this morning  Sob is now improved    This is likely due to htn urgency  cxr w/o acute cardiopulmonary disease  ekg nonischemic   Monitor w/treatment of htn urgency

## 2019-10-11 NOTE — ED NOTES
REGINE Kendall at the bedside  Aware blood glucose is 212 mg/dL, states to hold 5 units humalog        Clarisse Mayen RN  10/11/19 1424

## 2019-10-11 NOTE — ED PROVIDER NOTES
History  Chief Complaint   Patient presents with    Shortness of Breath     Pt reports sob/dizziness this moring  pt states she has several episodes like this in the [facility] for the past several months  pt reports intermittent cp as well  pt denies fevers/nausea/vomiting      80year-old female presents for evaluation of shortness of breath, chest pressure lightheadedness beginning upon waking this morning  The patient states that she has had lightheadedness and dizziness and occasional shortness of breath upon waking for several days however the chest pressure is new  The patient denies any change in her daily routine medications  The patient states that she took her medications this morning  Her symptoms have improved mildly since onset this morning however she has not done anything specific to make them better or worse  Shortness of Breath   Associated symptoms: chest pain    Associated symptoms: no abdominal pain, no fever, no headaches, no rash, no sore throat and no vomiting        Prior to Admission Medications   Prescriptions Last Dose Informant Patient Reported? Taking?    Docusate Sodium 100 MG/5ML ENEM   Yes No   Sig: Take 100 mg by mouth   ILEVRO 0 3 % SUSP   Yes No   Sig: USE 1 DROP IN RIGHT EYE STARTING THE DAY OF SURGERY WHEN YOU GET HOME FOR 28 DAYS   SITagliptin Phosphate (JANUVIA PO)  Self Yes No   Sig: Take by mouth daily Pt unsure dose    Timolol Maleate (TIMOPTIC OP)   Yes No   Sig: Administer 1 drop into the left eye daily at bedtime     acetaminophen (TYLENOL) 325 mg tablet   No No   Sig: Take 2 tablets (650 mg total) by mouth every 6 (six) hours as needed for mild pain   carvedilol (COREG) 12 5 mg tablet   Yes No   Sig: Take 12 5 mg by mouth 2 (two) times a day with meals     glimepiride (AMARYL) 1 mg tablet   No No   Sig: Take 1 tablet (1 mg total) by mouth daily with breakfast   lisinopril (ZESTRIL) 5 mg tablet   Yes No   Sig: Take 5 mg by mouth   meclizine (ANTIVERT) 12 5 MG tablet   Yes No   Sig: Take 12 5 mg by mouth 2 (two) times a day   naproxen (NAPROSYN) 500 mg tablet   No No   Sig: Take 1 tablet (500 mg total) by mouth every 12 (twelve) hours as needed for mild pain or moderate pain   pravastatin (PRAVACHOL) 40 mg tablet   Yes No   Sig: Take 40 mg by mouth daily   prednisoLONE acetate (PRED FORTE) 1 % ophthalmic suspension   Yes No   Sig: Administer 1 % into the left eye daily        Facility-Administered Medications: None       Past Medical History:   Diagnosis Date    Basal cell carcinoma     Diabetes mellitus ([facility] [address] )     Hypertension     Migraine        Past Surgical History:   Procedure Laterality Date    HIP SURGERY      ORIF Left Hip       History reviewed  No pertinent family history  I have reviewed and agree with the history as documented  Social History     Tobacco Use    Smoking status: Never Smoker    Smokeless tobacco: Never Used   Substance Use Topics    Alcohol use: No    Drug use: No        Review of Systems   Constitutional: Positive for chills and fatigue  Negative for fever  HENT: Negative for sore throat  Eyes: Negative for visual disturbance  Respiratory: Positive for chest tightness and shortness of breath  Cardiovascular: Positive for chest pain  Gastrointestinal: Negative for abdominal pain, diarrhea, nausea and vomiting  Genitourinary: Negative for difficulty urinating, dysuria and pelvic pain  Musculoskeletal: Negative for back pain  Skin: Negative for rash  Neurological: Positive for dizziness and light-headedness  Negative for syncope, weakness and headaches  All other systems reviewed and are negative  Physical Exam  Physical Exam   Constitutional: She is oriented to person, place, and time  She appears well-developed and well-nourished  No distress  HENT:   Head: Normocephalic and atraumatic     Right Ear: External ear normal    Left Ear: External ear normal    Eyes: Pupils are equal, round, and reactive to light  Conjunctivae and EOM are normal  No scleral icterus  Neck: Normal range of motion  Cardiovascular: Normal rate, regular rhythm and normal heart sounds  Pulmonary/Chest: Effort normal and breath sounds normal  No respiratory distress  Abdominal: Soft  Bowel sounds are normal  There is no tenderness  There is no rebound and no guarding  Musculoskeletal: Normal range of motion  She exhibits no edema  Neurological: She is alert and oriented to person, place, and time  Skin: Skin is warm and dry  No rash noted  Psychiatric: She has a normal mood and affect  Nursing note and vitals reviewed        Vital Signs  ED Triage Vitals [10/11/19 1222]   Temperature Pulse Respirations Blood Pressure SpO2   98 5 °F (36 9 °C) 64 18 (!) 229/100 98 %      Temp src Heart Rate Source Patient Position - Orthostatic VS BP Location FiO2 (%)   -- Monitor Sitting Right arm --      Pain Score       --           Vitals:    10/11/19 1222 10/11/19 1239   BP: (!) 229/100 (!) 210/94   Pulse: 64    Patient Position - Orthostatic VS: Sitting          Visual Acuity      ED Medications  Medications - No data to display    Diagnostic Studies  Results Reviewed     Procedure Component Value Units Date/Time    POCT urinalysis dipstick [[id number]]     Lab Status:  No result Specimen:  Urine     CBC and differential [[id number]]  (Abnormal) Collected:  10/11/19 1239    Lab Status:  Final result Specimen:  Blood from Arm, Left Updated:  10/11/19 1245     WBC 6 85 Thousand/uL      RBC 4 79 Million/uL      Hemoglobin 13 4 g/dL      Hematocrit 43 6 %      MCV 91 fL      MCH 28 0 pg      MCHC 30 7 g/dL      RDW 13 3 %      MPV 9 7 fL      Platelets 500 Thousands/uL      nRBC 0 /100 WBCs      Neutrophils Relative 69 %      Immat GRANS % 0 %      Lymphocytes Relative 19 %      Monocytes Relative 9 %      Eosinophils Relative 2 %      Basophils Relative 1 %      Neutrophils Absolute 4 75 Thousands/µL      Immature Grans Absolute 0 01 Thousand/uL      Lymphocytes Absolute 1 30 Thousands/µL      Monocytes Absolute 0 61 Thousand/µL      Eosinophils Absolute 0 11 Thousand/µL      Basophils Absolute 0 07 Thousands/µL     Troponin I [[id number]] Collected:  10/11/19 1239    Lab Status: In process Specimen:  Blood from Arm, Left Updated:  10/11/19 1242    Comprehensive metabolic panel [[id number]] Collected:  10/11/19 1239    Lab Status: In process Specimen:  Blood from Arm, Left Updated:  10/11/19 1242                 XR chest 2 views    (Results Pending)   CT head without contrast    (Results Pending)              Procedures  CriticalCare Time  Performed by: Elke Randall DO  Authorized by: Elke Randall DO     Critical care provider statement:     Critical care time (minutes):  35    Critical care time was exclusive of:  Separately billable procedures and treating other patients and teaching time    Critical care was necessary to treat or prevent imminent or life-threatening deterioration of the following conditions:  Circulatory failure    Critical care was time spent personally by me on the following activities:  Blood draw for specimens, obtaining history from patient or surrogate, development of treatment plan with patient or surrogate, discussions with consultants, evaluation of patient's response to treatment, examination of patient, ordering and performing treatments and interventions, ordering and review of laboratory studies, ordering and review of radiographic studies, re-evaluation of patient's condition, review of old charts and interpretation of cardiac output measurements    I assumed direction of critical care for this patient from another provider in my specialty: no    Comments:      IV antihypertensives for hypertension urgency           ED Course  ED Course as of Oct 11 1507   Fri Oct 11, 2019   1504 I discussed the case with the hospitalist  We reviewed the HPI, pertinent PMH, ED course and workup   Hospitalist agreed with plan and will admit the patient to the hospital                                   MDM  Number of Diagnoses or Management Options  Chest pain: new and requires workup  Dyspnea: new and requires workup  Hypertensive urgency: new and requires workup  Lightheaded: new and requires workup  Diagnosis management comments: Plan is to obtain a CT the head, EKG, laboratories and urinalysis to assess possible arrhythmia, acute coronary syndrome, urinary tract infection, anemia, renal failure, central neurologic event, other    IV antihypertensives for hypertensive urgency with plan for admission       Amount and/or Complexity of Data Reviewed  Clinical lab tests: reviewed and ordered  Tests in the radiology section of CPT®: ordered and reviewed  Tests in the medicine section of CPT®: ordered and reviewed  Review and summarize past medical records: yes  Discuss the patient with other providers: yes  Independent visualization of images, tracings, or specimens: yes        Disposition  Final diagnoses:   None     ED Disposition     None      Follow-up Information    None         Patient's Medications   Discharge Prescriptions    No medications on file     No discharge procedures on file      ED Provider  Electronically Signed by           Carlos Mcdowell DO  10/13/19 5815

## 2019-10-11 NOTE — ASSESSMENT & PLAN NOTE
Lab Results   Component Value Date    HGBA1C 9 7 (H) 11/10/2018       Recent Labs     10/11/19  1602   POCGLU 212*       Blood Sugar Average: Last 72 hrs:  (P) 212   Poorly controlled chronically    Pt does not check her bs and is maintained on amaryl/januvia  Hold amaryl start ssi/poc bs

## 2019-10-11 NOTE — ASSESSMENT & PLAN NOTE
/97  Likely 2* nsaid use  Pt has dizziness/lightheadedness    No other focal neurodeficits  Ct head negative for ischemia/hemorrhage/midline shift  Was decreased to 5mg lisinopril in 05/2019  rec'd iv labetalol and hydralazine in ed  Give additional 5mg po lisinopril now and start at 10mg po daily in am  Continue hydralazine 10mg prn sbp>180mmHg  Goal sbp of 170mmHg in first 24h

## 2019-10-11 NOTE — ED NOTES
Patient is resting  Ice pack supplied for her chronic right arm pain        Lear LAMONT Stephenson  10/11/19 7905

## 2019-10-11 NOTE — H&P
H&P- Taylor Bradley 5/2/1923, 80 y o  female MRN: [id number]    Unit/Bed#: ED 21 Encounter: [id number]    Primary Care Provider: Adarsh Potter DO   Date and time admitted to hospital: 10/11/2019 12:25 PM        * Hypertensive urgency  Assessment & Plan  /97  Likely 2* nsaid use  Pt has dizziness/lightheadedness  No other focal neurodeficits  Ct head negative for ischemia/hemorrhage/midline shift  Was decreased to 5mg lisinopril in 05/2019  rec'd iv labetalol and hydralazine in ed  Give additional 5mg po lisinopril now and start at 10mg po daily in am  Continue hydralazine 10mg prn sbp>180mmHg  Goal sbp of 170mmHg in first 24h    SOB (shortness of breath)  Assessment & Plan  Patient w/sob and dizziness this morning  Sob is now improved  This is likely due to htn urgency  cxr w/o acute cardiopulmonary disease  ekg nonischemic   Monitor w/treatment of htn urgency    Meningioma [facility])  Assessment & Plan  Ct head demonstrates 10 x 8 mm extra-axial lesion in the frontal left parafalcine region, stable likely representing meningioma Stable from 2018      Type 2 diabetes mellitus without complication, without long-term current use of insulin [facility])  Assessment & Plan  Lab Results   Component Value Date    HGBA1C 9 7 (H) 11/10/2018       Recent Labs     10/11/19  1602   POCGLU 212*       Blood Sugar Average: Last 72 hrs:  (P) 212   Poorly controlled chronically  Pt does not check her bs and is maintained on amaryl/januvia  Hold amaryl start ssi/poc bs      VTE Prophylaxis: Heparin  / sequential compression device   Code Status: level 3 dni/dnr  POLST: There is no POLST form on file for this patient (pre-hospital)  Discussion with family:    Anticipated Length of Stay:  Patient will be admitted on an Inpatient basis with an anticipated length of stay of  Greater than 2 midnights  Justification for Hospital Stay: htn urgency    Total Time for Visit, including Counseling / Coordination of Care: 45 minutes  Greater than 50% of this total time spent on direct patient counseling and coordination of care  Chief Complaint:   Sob dizziness    History of Present Illness:    Trang Faye is a 80 y o  female who presents with pmh of htn, dm, chronic dizziness on meclizine coming to hospital for worsening dizziness and sob  Pt reports she was in her normal state of health when this morning she developed acute onset sob and a choking sensation and difficulty breathing  She came to the ed to be evaluated and was found to be in htn urgency and w/BS in 300s  Her s/sx improved w/IV labetalol/hydralazine  She reports she is still lightheaded/woozy  She has no room spinning sensation, headache, blurry vision, numbness, weakness  No recent falls or head injuries  No cp  No n/v/f/c  Chronic tinnitus but no hearing loss  No recent uri s/sx or gi s/sx  Of note the patient has had dizziness for years which she attributes to her uncontrolled dm  She does not check her bs at home and does not check her bp at home and never has  She also notes that last Wednesday she received 2 cortisone injections in her right arm for arthritis pains and the evening prior to admission took 3 advils at her daughter's recommendation for joint pain  We are asked to admit pt for htn urgency  Review of Systems:    Review of Systems   Constitutional: Negative for appetite change, chills and fever  HENT: Negative for sore throat  Respiratory: Positive for choking and shortness of breath  Cardiovascular: Negative for chest pain  Gastrointestinal: Negative for abdominal pain, nausea and vomiting  Musculoskeletal: Positive for arthralgias  Neurological: Positive for dizziness and light-headedness  Negative for headaches  All other systems reviewed and are negative        Past Medical and Surgical History:     Past Medical History:   Diagnosis Date    Basal cell carcinoma     Diabetes mellitus ([facility] [address] )     Hypertension     Migraine        Past Surgical History:   Procedure Laterality Date    HIP SURGERY      ORIF Left Hip       Meds/Allergies:    Prior to Admission medications    Medication Sig Start Date End Date Taking? Authorizing Provider   acetaminophen (TYLENOL) 325 mg tablet Take 2 tablets (650 mg total) by mouth every 6 (six) hours as needed for mild pain 12/11/18  Yes SAMIRA Gonzales   carvedilol (COREG) 12 5 mg tablet Take 12 5 mg by mouth 2 (two) times a day with meals     Yes Historical Provider, MD   Docusate Sodium 100 MG/5ML ENEM Take 100 mg by mouth   Yes Historical Provider, MD   glimepiride (AMARYL) 1 mg tablet Take 1 tablet (1 mg total) by mouth daily with breakfast 11/12/18  Yes SAMIRA Wise   ILEVRO 0 3 % SUSP USE 1 DROP IN RIGHT EYE STARTING THE DAY OF SURGERY WHEN YOU GET HOME FOR 28 DAYS 3/25/19  Yes Historical Provider, MD   lisinopril (ZESTRIL) 10 mg tablet Take 10 mg by mouth    Yes Historical Provider, MD   naproxen (NAPROSYN) 500 mg tablet Take 1 tablet (500 mg total) by mouth every 12 (twelve) hours as needed for mild pain or moderate pain 12/2/18  Yes Juan Boys, DO   prednisoLONE acetate (PRED FORTE) 1 % ophthalmic suspension Administer 1 % into the left eye daily   2/10/15  Yes Historical Provider, MD   SITagliptin Phosphate (JANUVIA PO) Take by mouth daily Pt unsure dose    Yes Historical Provider, MD   Timolol Maleate (TIMOPTIC OP) Administer 1 drop into the left eye daily at bedtime     Yes Historical Provider, MD   meclizine (ANTIVERT) 12 5 MG tablet Take 12 5 mg by mouth 2 (two) times a day 3/22/19   Historical Provider, MD   pravastatin (PRAVACHOL) 40 mg tablet Take 40 mg by mouth daily  10/11/19  Historical Provider, MD     I have reviewed home medications with a medical source (PCP, Pharmacy, other)  Allergies:    Allergies   Allergen Reactions    Atorvastatin Hives    Fentanyl Other (See Comments)    Hydrocodone-Acetaminophen GI Intolerance    Metronidazole Other (See Comments)    Penicillins Other (See Comments)    Sulfa Antibiotics Other (See Comments)     takes Amaryl @ home,takes Hyzaar @ home    Tramadol Other (See Comments)    Metoclopramide Rash    Oxycodone-Acetaminophen Rash       Social History:     Marital Status:    Occupation:   Patient Pre-hospital Living Situation:   Patient Pre-hospital Level of Mobility:   Patient Pre-hospital Diet Restrictions:   Substance Use History:   Social History     Substance and Sexual Activity   Alcohol Use No     Social History     Tobacco Use   Smoking Status Never Smoker   Smokeless Tobacco Never Used     Social History     Substance and Sexual Activity   Drug Use No       Family History:    History reviewed  No pertinent family history  Physical Exam:     Vitals:   Blood Pressure: (!) 180/72 (10/11/19 1620)  Pulse: 76 (10/11/19 1620)  Temperature: 98 5 °F (36 9 °C) (10/11/19 1222)  Respirations: 16 (10/11/19 1620)  SpO2: 98 % (10/11/19 1620)    Physical Exam   Constitutional: She appears well-developed  No distress  HENT:   Head: Normocephalic and atraumatic  Right Ear: External ear normal    Left Ear: External ear normal    Mouth/Throat: Oropharynx is clear and moist    Eyes: Conjunctivae are normal    Left eye cornea is scarred and whitened   Neck: Normal range of motion  Cardiovascular: Normal rate, regular rhythm and normal heart sounds  Exam reveals no gallop and no friction rub  No murmur heard  Pulmonary/Chest: Effort normal  No stridor  No respiratory distress  She has no wheezes  She has no rales  Abdominal: Soft  She exhibits no distension and no mass  There is no tenderness  There is no rebound and no guarding  Musculoskeletal: She exhibits no edema  Neurological: She is alert  Skin: Skin is warm and dry  She is not diaphoretic  Psychiatric: She has a normal mood and affect  Vitals reviewed        (  Be Sure to Include Physical Exam: Delete this entire line when you have entered your exam)    Additional Data:     Lab Results: I have personally reviewed pertinent reports  Results from last 7 days   Lab Units 10/11/19  1239   WBC Thousand/uL 6 85   HEMOGLOBIN g/dL 13 4   HEMATOCRIT % 43 6   PLATELETS Thousands/uL 491*   NEUTROS PCT % 69   LYMPHS PCT % 19   MONOS PCT % 9   EOS PCT % 2     Results from last 7 days   Lab Units 10/11/19  1239   SODIUM mmol/L 139   POTASSIUM mmol/L 4 3   CHLORIDE mmol/L 103   CO2 mmol/L 32   BUN mg/dL 22   CREATININE mg/dL 1 31*   ANION GAP mmol/L 4   CALCIUM mg/dL 9 1   ALBUMIN g/dL 3 4*   TOTAL BILIRUBIN mg/dL 0 44   ALK PHOS U/L 112   ALT U/L 9*   AST U/L 12   GLUCOSE RANDOM mg/dL 355*         Results from last 7 days   Lab Units 10/11/19  1602   POC GLUCOSE mg/dl 212*               Imaging: I have personally reviewed pertinent reports  XR chest 2 views   Final Result by Kirit Parrish MD (10/11 5132)      No acute cardiopulmonary disease  Findings are stable      Workstation performed: [id number]         CT head without contrast   Final Result by Ilan Frazier MD (10/11 4850)      No acute intracranial hemorrhage seen   No extra-axial collection seen   Moderate chronic small vessel ischemic changes   No CT signs of acute territorial infarction   A 10 x 8 mm extra-axial lesion in the frontal left parafalcine region, stable likely representing meningioma               Workstation performed: [id number]             EKG, Pathology, and Other Studies Reviewed on Admission:   · EKG: nsr no ischemic changes in2+ leads no arrhythjmias    Allscripts / Epic Records Reviewed: Yes     ** Please Note: This note has been constructed using a voice recognition system   **

## 2019-10-12 PROBLEM — R53.83 FATIGUE: Status: ACTIVE | Noted: 2019-10-12

## 2019-10-12 LAB
ANION GAP SERPL CALCULATED.3IONS-SCNC: 7 MMOL/L (ref 4–13)
BUN SERPL-MCNC: 20 MG/DL (ref 5–25)
CALCIUM SERPL-MCNC: 9.2 MG/DL (ref 8.3–10.1)
CHLORIDE SERPL-SCNC: 108 MMOL/L (ref 100–108)
CO2 SERPL-SCNC: 29 MMOL/L (ref 21–32)
CREAT SERPL-MCNC: 1.21 MG/DL (ref 0.6–1.3)
FOLATE SERPL-MCNC: 17.1 NG/ML (ref 3.1–17.5)
GFR SERPL CREATININE-BSD FRML MDRD: 38 ML/MIN/1.73SQ M
GLUCOSE SERPL-MCNC: 141 MG/DL (ref 65–140)
GLUCOSE SERPL-MCNC: 145 MG/DL (ref 65–140)
GLUCOSE SERPL-MCNC: 191 MG/DL (ref 65–140)
GLUCOSE SERPL-MCNC: 213 MG/DL (ref 65–140)
GLUCOSE SERPL-MCNC: 263 MG/DL (ref 65–140)
PLATELET # BLD AUTO: 443 THOUSANDS/UL (ref 149–390)
PMV BLD AUTO: 9.5 FL (ref 8.9–12.7)
POTASSIUM SERPL-SCNC: 4.3 MMOL/L (ref 3.5–5.3)
SODIUM SERPL-SCNC: 144 MMOL/L (ref 136–145)
TSH SERPL DL<=0.05 MIU/L-ACNC: 0.37 UIU/ML (ref 0.36–3.74)
VIT B12 SERPL-MCNC: 355 PG/ML (ref 100–900)

## 2019-10-12 PROCEDURE — 84443 ASSAY THYROID STIM HORMONE: CPT | Performed by: INTERNAL MEDICINE

## 2019-10-12 PROCEDURE — 82948 REAGENT STRIP/BLOOD GLUCOSE: CPT

## 2019-10-12 PROCEDURE — 99232 SBSQ HOSP IP/OBS MODERATE 35: CPT | Performed by: INTERNAL MEDICINE

## 2019-10-12 PROCEDURE — 85049 AUTOMATED PLATELET COUNT: CPT | Performed by: PHYSICIAN ASSISTANT

## 2019-10-12 PROCEDURE — 82607 VITAMIN B-12: CPT | Performed by: INTERNAL MEDICINE

## 2019-10-12 PROCEDURE — 80048 BASIC METABOLIC PNL TOTAL CA: CPT | Performed by: PHYSICIAN ASSISTANT

## 2019-10-12 PROCEDURE — 82746 ASSAY OF FOLIC ACID SERUM: CPT | Performed by: INTERNAL MEDICINE

## 2019-10-12 RX ADMIN — HEPARIN SODIUM 5000 UNITS: 5000 INJECTION INTRAVENOUS; SUBCUTANEOUS at 05:19

## 2019-10-12 RX ADMIN — TIMOLOL MALEATE 1 DROP: 2.5 SOLUTION OPHTHALMIC at 21:25

## 2019-10-12 RX ADMIN — INSULIN LISPRO 1 UNITS: 100 INJECTION, SOLUTION INTRAVENOUS; SUBCUTANEOUS at 21:24

## 2019-10-12 RX ADMIN — HEPARIN SODIUM 5000 UNITS: 5000 INJECTION INTRAVENOUS; SUBCUTANEOUS at 21:23

## 2019-10-12 RX ADMIN — SODIUM CHLORIDE AND POTASSIUM CHLORIDE 50 ML/HR: .9; .15 SOLUTION INTRAVENOUS at 18:56

## 2019-10-12 RX ADMIN — MECLIZINE 12.5 MG: 12.5 TABLET ORAL at 08:10

## 2019-10-12 RX ADMIN — PREDNISOLONE ACETATE 1 DROP: 10 SUSPENSION/ DROPS OPHTHALMIC at 08:09

## 2019-10-12 RX ADMIN — HEPARIN SODIUM 5000 UNITS: 5000 INJECTION INTRAVENOUS; SUBCUTANEOUS at 14:07

## 2019-10-12 RX ADMIN — INSULIN LISPRO 2 UNITS: 100 INJECTION, SOLUTION INTRAVENOUS; SUBCUTANEOUS at 14:08

## 2019-10-12 RX ADMIN — CARVEDILOL 12.5 MG: 6.25 TABLET, FILM COATED ORAL at 07:42

## 2019-10-12 RX ADMIN — MECLIZINE 12.5 MG: 12.5 TABLET ORAL at 16:10

## 2019-10-12 RX ADMIN — MELATONIN 3 MG: 3 TAB ORAL at 21:23

## 2019-10-12 RX ADMIN — CARVEDILOL 12.5 MG: 6.25 TABLET, FILM COATED ORAL at 16:10

## 2019-10-12 RX ADMIN — LISINOPRIL 10 MG: 10 TABLET ORAL at 08:10

## 2019-10-12 RX ADMIN — INSULIN LISPRO 2 UNITS: 100 INJECTION, SOLUTION INTRAVENOUS; SUBCUTANEOUS at 17:40

## 2019-10-12 NOTE — ASSESSMENT & PLAN NOTE
/97    -likely secondary to NSAID use and recent intra-articular and intramuscular steroid injection  -blood pressure is improving, hydralazine 10 mg IV p r n    No other focal neurodeficits  Ct head negative for ischemia/hemorrhage/midline shift

## 2019-10-12 NOTE — PROGRESS NOTES
Progress Note - Della Foy 5/2/1923, 80 y o  female MRN: [id number]    Unit[facility]Bed[facility] [address] [id number] Encounter: [id number]    Primary Care Provider: Emily Rodriguez DO   Date and time admitted to hospital: 10/11/2019 12:25 PM        * Hypertensive urgency  Assessment & Plan  /97    -likely secondary to NSAID use and recent intra-articular and intramuscular steroid injection  -blood pressure is improving, hydralazine 10 mg IV p r n  No other focal neurodeficits  Ct head negative for ischemia/hemorrhage/midline shift      SOB (shortness of breath)  Assessment & Plan  Patient w/sob and dizziness this morning  Sob is now improved  This is likely due to htn urgency  cxr w/o acute cardiopulmonary disease  ekg nonischemic   Monitor w/treatment of htn urgency    Fatigue  Assessment & Plan  -patient complains of having fatigue mainly in the mornings lasted about 1 hour with some joint stiffness  -likely secondary to arthritis however will check TSH, B12, folate    Meningioma (HCC)  Assessment & Plan  Ct head demonstrates 10 x 8 mm extra-axial lesion in the frontal left parafalcine region, stable likely representing meningioma Stable from 2018      Type 2 diabetes mellitus without complication, without long-term current use of insulin [facility])  Assessment & Plan  Lab Results   Component Value Date    HGBA1C 9 7 (H) 11/10/2018       Recent Labs     10/11/19  1602 10/11/19  2056 10/12/19  0739 10/12/19  1120   POCGLU 212* 188* 141* 263*       Blood Sugar Average: Last 72 hrs:  (P) 201   Poorly controlled chronically    Pt does not check her bs and is maintained on amaryl/januvia  Hold oral hypoglycemic, monitor Accu-Cheks, sliding scale for coverage  -discussed with patient regarding needing to initiate insulin upon discharge, patient is very reluctant and states she does not want to be on insulin and she will monitor blood sugars at home and control with diet and oral hypoglycemic        VTE Pharmacologic Prophylaxis:   Pharmacologic:  Heparin    Patient Centered Rounds: I have performed bedside rounds with nursing staff today  Education and Discussions with Family / Patient:  Daughter and son at bedside    Time Spent for Care: 20 minutes  More than 50% of total time spent on counseling and coordination of care as described above  Current Length of Stay: 1 day(s)    Current Patient Status: Inpatient   Certification Statement: The patient will continue to require additional inpatient hospital stay due to Hypertensive urgency    Discharge Plan / Estimated Discharge Date: 24-48 hours    Code Status: Level 3 - DNAR and DNI      Subjective:   Patient seen and examined at bedside, states she feels tired when evaluated in the morning  Patient re-evaluated later in the afternoon states she feels better  She denies any chest pain, palpitations, dizziness, headaches  Objective:     Vitals:   Temp (24hrs), Av 4 °F (36 9 °C), Min:98 2 °F (36 8 °C), Max:98 6 °F (37 °C)    Temp:  [98 2 °F (36 8 °C)-98 6 °F (37 °C)] 98 3 °F (36 8 °C)  HR:  [68-76] 76  Resp:  [16-20] 16  BP: (128-214)/(63-87) 175/77  SpO2:  [94 %-98 %] 94 %  Body mass index is 23 96 kg/m²  Input and Output Summary (last 24 hours):     No intake or output data in the 24 hours ending 10/12/19 6186    Physical Exam:    Constitutional: Patient is oriented to person, place and time, no acute distress  HEENT:  Normocephalic, atraumatic, EOMI, PERRLA, no scleral icterus, no pallor, moist oral mucosa  Neck:  Supple, no masses, no thyromegaly, no bruits Normal range of motion  Lymph nodes:  No lymphadenopathy  Cardiovascular: Normal S1S2, RRR, No murmurs/rubs/gallops appreciated  Pulmonary:  Bilateral air entry, No rhonchi/rales/wheezing appreciated  Abdominal: Soft, Bowel sounds present, Non-tender, Non-distended, No rebound/guarding, no hepatomegaly   Musculoskeletal: No tenderness/abnormality   Extremities:  No cyanosis, clubbing or edema  Peripheral pulses palpable and equal bilaterally  Neurological: Cranial nerves II-XII grossly intact, sensation intact, otherwise no focal neurological symptoms  Skin: Skin is warm and dry, no rashes  Additional Data:     Labs:    Results from last 7 days   Lab Units 10/12/19  1531 10/11/19  1239   WBC Thousand/uL  --  6 85   HEMOGLOBIN g/dL  --  13 4   HEMATOCRIT %  --  43 6   PLATELETS Thousands/uL 443* 491*   NEUTROS PCT %  --  69   LYMPHS PCT %  --  19   MONOS PCT %  --  9   EOS PCT %  --  2     Results from last 7 days   Lab Units 10/12/19  0606 10/11/19  1239   POTASSIUM mmol/L 4 3 4 3   CHLORIDE mmol/L 108 103   CO2 mmol/L 29 32   BUN mg/dL 20 22   CREATININE mg/dL 1 21 1 31*   CALCIUM mg/dL 9 2 9 1   ALK PHOS U/L  --  112   ALT U/L  --  9*   AST U/L  --  12            I Have Reviewed All Lab Data Listed Above          Recent Cultures (last 7 days):           Last 24 Hours Medication List:     Current Facility-Administered Medications:  acetaminophen 650 mg Oral Q6H PRN Emmie Nuñez PA-C    carvedilol 12 5 mg Oral BID With Meals Emmie Nuñez PA-C    heparin (porcine) 5,000 Units Subcutaneous Q8H [address] Emmie Nuñez PA-C    hydrALAZINE 10 mg Intravenous Q6H PRN Emmie Nuñez PA-C    insulin lispro 1-5 Units Subcutaneous 4x Daily (AC & HS) Emmie Nuñez PA-C    insulin lispro 5 Units Subcutaneous Once Agusto REGINE Villalobos    lisinopril 10 mg Oral Daily Emmie Nuñez PA-C    meclizine 12 5 mg Oral BID Emmie Nuñez PA-C    melatonin 3 mg Oral HS Dimas Bush MD    ondansetron 4 mg Intravenous Q6H PRN Emmie Nuñez PA-C    prednisoLONE acetate 1 drop Left Eye Daily Emmie Nuñez PA-C    sodium chloride 0 9 % with KCl 20 mEq/L 50 mL/hr Intravenous Continuous Emmie Nuñez PA-C Last Rate: 50 mL/hr (10/11/19 7817)   timolol 1 drop Left Eye HS Agusto Villalobos PA-C         Today, Patient Was Seen By: Kevin Xie MD

## 2019-10-12 NOTE — PLAN OF CARE
Problem: PAIN - ADULT  Goal: Verbalizes/displays adequate comfort level or baseline comfort level  Description  Interventions:  - Encourage patient to monitor pain and request assistance  - Assess pain using appropriate pain scale  - Administer analgesics based on type and severity of pain and evaluate response  - Implement non-pharmacological measures as appropriate and evaluate response  - Consider cultural and social influences on pain and pain management  - Notify physician/advanced practitioner if interventions unsuccessful or patient reports new pain  Outcome: Progressing     Problem: INFECTION - ADULT  Goal: Absence or prevention of progression during hospitalization  Description  INTERVENTIONS:  - Assess and monitor for signs and symptoms of infection  - Monitor lab/diagnostic results  - Monitor all insertion sites, i e  indwelling lines, tubes, and drains  - Monitor endotracheal if appropriate and nasal secretions for changes in amount and color  - [place] appropriate cooling/warming therapies per order  - Administer medications as ordered  - Instruct and encourage patient and family to use good hand hygiene technique  - Identify and instruct in appropriate isolation precautions for identified infection/condition  Outcome: Progressing     Problem: SAFETY ADULT  Goal: Patient will remain free of falls  Description  INTERVENTIONS:  - Assess patient frequently for physical needs  -  Identify cognitive and physical deficits and behaviors that affect risk of falls    -  [place] fall precautions as indicated by assessment   - Educate patient/family on patient safety including physical limitations  - Instruct patient to call for assistance with activity based on assessment  - Modify environment to reduce risk of injury  - Consider OT/PT consult to assist with strengthening/mobility  Outcome: Progressing  Goal: Maintain or return to baseline ADL function  Description  INTERVENTIONS:  -  Assess patient's ability to carry out ADLs; assess patient's baseline for ADL function and identify physical deficits which impact ability to perform ADLs (bathing, care of mouth/teeth, toileting, grooming, dressing, etc )  - Assess/evaluate cause of self-care deficits   - Assess range of motion  - Assess patient's mobility; develop plan if impaired  - Assess patient's need for assistive devices and provide as appropriate  - Encourage maximum independence but intervene and supervise when necessary  - Involve family in performance of ADLs  - Assess for home care needs following discharge   - Consider OT consult to assist with ADL evaluation and planning for discharge  - Provide patient education as appropriate  Outcome: Progressing  Goal: Maintain or return mobility status to optimal level  Description  INTERVENTIONS:  - Assess patient's baseline mobility status (ambulation, transfers, stairs, etc )    - Identify cognitive and physical deficits and behaviors that affect mobility  - Identify mobility aids required to assist with transfers and/or ambulation (gait belt, sit-to-stand, lift, walker, cane, etc )  - [place] fall precautions as indicated by assessment  - Record patient progress and toleration of activity level on Mobility SBAR; progress patient to next Phase/Stage  - Instruct patient to call for assistance with activity based on assessment  - Consider rehabilitation consult to assist with strengthening/weightbearing, etc   Outcome: Progressing     Problem: DISCHARGE PLANNING  Goal: Discharge to home or other facility with appropriate resources  Description  INTERVENTIONS:  - Identify barriers to discharge w/patient and caregiver  - Arrange for needed discharge resources and transportation as appropriate  - Identify discharge learning needs (meds, wound care, etc )  - Arrange for interpretive services to assist at discharge as needed  - Refer to Case Management Department for coordinating discharge planning if the patient needs post-hospital services based on physician/advanced practitioner order or complex needs related to functional status, cognitive ability, or social support system  Outcome: Progressing     Problem: Knowledge Deficit  Goal: Patient/family/caregiver demonstrates understanding of disease process, treatment plan, medications, and discharge instructions  Description  Complete learning assessment and assess knowledge base  Interventions:  - Provide teaching at level of understanding  - Provide teaching via preferred learning methods  Outcome: Progressing     Problem: Potential for Falls  Goal: Patient will remain free of falls  Description  INTERVENTIONS:  - Assess patient frequently for physical needs  -  Identify cognitive and physical deficits and behaviors that affect risk of falls    -  [place] fall precautions as indicated by assessment   - Educate patient/family on patient safety including physical limitations  - Instruct patient to call for assistance with activity based on assessment  - Modify environment to reduce risk of injury  - Consider OT/PT consult to assist with strengthening/mobility  Outcome: Progressing

## 2019-10-12 NOTE — ASSESSMENT & PLAN NOTE
Lab Results   Component Value Date    HGBA1C 9 7 (H) 11/10/2018       Recent Labs     10/11/19  1602 10/11/19  2056 10/12/19  0739 10/12/19  1120   POCGLU 212* 188* 141* 263*       Blood Sugar Average: Last 72 hrs:  (P) 201   Poorly controlled chronically    Pt does not check her bs and is maintained on amaryl/januvia  Hold oral hypoglycemic, monitor Accu-Cheks, sliding scale for coverage  -discussed with patient regarding needing to initiate insulin upon discharge, patient is very reluctant and states she does not want to be on insulin and she will monitor blood sugars at home and control with diet and oral hypoglycemic

## 2019-10-12 NOTE — NURSING NOTE
Patient refusing Insulin  States she takes metformin at home and that her blood glucose is regularly high  Explained that we administer insulin in the hospital rather than metformin  She will speak with the Attending physician

## 2019-10-12 NOTE — ASSESSMENT & PLAN NOTE
-patient complains of having fatigue mainly in the mornings lasted about 1 hour with some joint stiffness  -likely secondary to arthritis however will check TSH, B12, folate

## 2019-10-13 VITALS
WEIGHT: 143.96 LBS | HEIGHT: 65 IN | DIASTOLIC BLOOD PRESSURE: 64 MMHG | HEART RATE: 70 BPM | RESPIRATION RATE: 16 BRPM | TEMPERATURE: 98.4 F | BODY MASS INDEX: 23.99 KG/M2 | SYSTOLIC BLOOD PRESSURE: 154 MMHG | OXYGEN SATURATION: 95 %

## 2019-10-13 PROBLEM — R06.02 SOB (SHORTNESS OF BREATH): Status: RESOLVED | Noted: 2019-10-11 | Resolved: 2019-10-13

## 2019-10-13 LAB
ANION GAP SERPL CALCULATED.3IONS-SCNC: 10 MMOL/L (ref 4–13)
BASOPHILS # BLD AUTO: 0.07 THOUSANDS/ΜL (ref 0–0.1)
BASOPHILS NFR BLD AUTO: 1 % (ref 0–1)
BUN SERPL-MCNC: 21 MG/DL (ref 5–25)
CALCIUM SERPL-MCNC: 9.3 MG/DL (ref 8.3–10.1)
CHLORIDE SERPL-SCNC: 108 MMOL/L (ref 100–108)
CO2 SERPL-SCNC: 24 MMOL/L (ref 21–32)
CREAT SERPL-MCNC: 1.1 MG/DL (ref 0.6–1.3)
EOSINOPHIL # BLD AUTO: 0.18 THOUSAND/ΜL (ref 0–0.61)
EOSINOPHIL NFR BLD AUTO: 2 % (ref 0–6)
ERYTHROCYTE [DISTWIDTH] IN BLOOD BY AUTOMATED COUNT: 13.8 % (ref 11.6–15.1)
GFR SERPL CREATININE-BSD FRML MDRD: 42 ML/MIN/1.73SQ M
GLUCOSE SERPL-MCNC: 140 MG/DL (ref 65–140)
GLUCOSE SERPL-MCNC: 142 MG/DL (ref 65–140)
GLUCOSE SERPL-MCNC: 279 MG/DL (ref 65–140)
HCT VFR BLD AUTO: 41.9 % (ref 34.8–46.1)
HGB BLD-MCNC: 12.6 G/DL (ref 11.5–15.4)
IMM GRANULOCYTES # BLD AUTO: 0.02 THOUSAND/UL (ref 0–0.2)
IMM GRANULOCYTES NFR BLD AUTO: 0 % (ref 0–2)
LYMPHOCYTES # BLD AUTO: 1.86 THOUSANDS/ΜL (ref 0.6–4.47)
LYMPHOCYTES NFR BLD AUTO: 19 % (ref 14–44)
MCH RBC QN AUTO: 27.9 PG (ref 26.8–34.3)
MCHC RBC AUTO-ENTMCNC: 30.1 G/DL (ref 31.4–37.4)
MCV RBC AUTO: 93 FL (ref 82–98)
MONOCYTES # BLD AUTO: 0.96 THOUSAND/ΜL (ref 0.17–1.22)
MONOCYTES NFR BLD AUTO: 10 % (ref 4–12)
NEUTROPHILS # BLD AUTO: 6.52 THOUSANDS/ΜL (ref 1.85–7.62)
NEUTS SEG NFR BLD AUTO: 68 % (ref 43–75)
NRBC BLD AUTO-RTO: 0 /100 WBCS
PLATELET # BLD AUTO: 447 THOUSANDS/UL (ref 149–390)
PMV BLD AUTO: 9.6 FL (ref 8.9–12.7)
POTASSIUM SERPL-SCNC: 4.7 MMOL/L (ref 3.5–5.3)
RBC # BLD AUTO: 4.51 MILLION/UL (ref 3.81–5.12)
SODIUM SERPL-SCNC: 142 MMOL/L (ref 136–145)
WBC # BLD AUTO: 9.61 THOUSAND/UL (ref 4.31–10.16)

## 2019-10-13 PROCEDURE — 99239 HOSP IP/OBS DSCHRG MGMT >30: CPT | Performed by: INTERNAL MEDICINE

## 2019-10-13 PROCEDURE — 85025 COMPLETE CBC W/AUTO DIFF WBC: CPT | Performed by: INTERNAL MEDICINE

## 2019-10-13 PROCEDURE — 82948 REAGENT STRIP/BLOOD GLUCOSE: CPT

## 2019-10-13 PROCEDURE — 80048 BASIC METABOLIC PNL TOTAL CA: CPT | Performed by: INTERNAL MEDICINE

## 2019-10-13 RX ORDER — AMLODIPINE BESYLATE 10 MG/1
10 TABLET ORAL DAILY
Qty: 30 TABLET | Refills: 0 | Status: SHIPPED | OUTPATIENT
Start: 2019-10-14 | End: 2021-03-23 | Stop reason: ALTCHOICE

## 2019-10-13 RX ORDER — AMLODIPINE BESYLATE 10 MG/1
10 TABLET ORAL DAILY
Status: DISCONTINUED | OUTPATIENT
Start: 2019-10-13 | End: 2019-10-13 | Stop reason: HOSPADM

## 2019-10-13 RX ORDER — SODIUM CHLORIDE AND POTASSIUM CHLORIDE .9; .15 G/100ML; G/100ML
75 SOLUTION INTRAVENOUS CONTINUOUS
Status: DISCONTINUED | OUTPATIENT
Start: 2019-10-13 | End: 2019-10-13

## 2019-10-13 RX ADMIN — LISINOPRIL 10 MG: 10 TABLET ORAL at 08:14

## 2019-10-13 RX ADMIN — CARVEDILOL 12.5 MG: 6.25 TABLET, FILM COATED ORAL at 08:14

## 2019-10-13 RX ADMIN — AMLODIPINE BESYLATE 10 MG: 10 TABLET ORAL at 08:14

## 2019-10-13 RX ADMIN — MECLIZINE 12.5 MG: 12.5 TABLET ORAL at 08:14

## 2019-10-13 RX ADMIN — HEPARIN SODIUM 5000 UNITS: 5000 INJECTION INTRAVENOUS; SUBCUTANEOUS at 05:15

## 2019-10-13 RX ADMIN — PREDNISOLONE ACETATE 1 DROP: 10 SUSPENSION/ DROPS OPHTHALMIC at 08:14

## 2019-10-13 RX ADMIN — SODIUM CHLORIDE AND POTASSIUM CHLORIDE 75 ML/HR: .9; .15 SOLUTION INTRAVENOUS at 09:18

## 2019-10-13 RX ADMIN — INSULIN LISPRO 3 UNITS: 100 INJECTION, SOLUTION INTRAVENOUS; SUBCUTANEOUS at 12:43

## 2019-10-13 NOTE — ASSESSMENT & PLAN NOTE
· Patient's home medications are Zestril and Coreg, we have added amlodipine here  · Will recommend new home regimen upon discharge based on hospital stay    · Hesitancy to up titrate Coreg as it may mask hypoglycemia and patient is diabetic

## 2019-10-13 NOTE — ASSESSMENT & PLAN NOTE
Lab Results   Component Value Date    HGBA1C 9 7 (H) 11/10/2018       Recent Labs     10/12/19  1120 10/12/19  1632 10/12/19  2105 10/13/19  0734   POCGLU 263* 213* 191* 140       Blood Sugar Average: Last 72 hrs:  (P) 192 [id number]   · Poorly controlled chronically    Pt does not check her bs and is maintained on amaryl/januvia  · Hold oral hypoglycemic, monitor Accu-Cheks, sliding scale for coverage  · Discussed with patient regarding needing to initiate insulin upon discharge, patient is very reluctant and states she does not want to be on insulin and she will monitor blood sugars at home and control with diet and oral hypoglycemic  · Per patient request, will transition to home p o  medications and will not initiate insulin at this time

## 2019-10-13 NOTE — ASSESSMENT & PLAN NOTE
· /97  · Likely secondary to NSAID use and recent intra-articular and intramuscular steroid injection  · Blood pressure is elevated  · Has been maintained on Coreg and lisinopril  · 10/13/2019 - added amlodipine with improvement of patient's blood pressure     · No other focal neurodeficits  · Ct head negative for ischemia/hemorrhage/midline shift

## 2019-10-13 NOTE — ASSESSMENT & PLAN NOTE
· Patient complains of having fatigue mainly in the mornings lasted about 1 hour with some joint stiffness  · Likely secondary to arthritis, however checked TSH (normal), B12 (low-normal 322), folate 17 1  · Patient does not desire Rehab

## 2019-10-13 NOTE — DISCHARGE SUMMARY
Discharge- Serene Po 5/2/1923, 80 y o  female MRN: [id number]  Unit/Bed#: [facility] 2 [address] 223-01 Encounter: [id number]  Primary Care Provider: Vishal Lam DO   Date and time admitted to hospital: 10/11/2019 12:25 PM    * Hypertensive urgency  Assessment & Plan  · /97  · Likely secondary to NSAID use and recent intra-articular and intramuscular steroid injection  · Blood pressure is elevated  · Has been maintained on Coreg and lisinopril  · 10/13/2019 - added amlodipine with improvement of patient's blood pressure  · No other focal neurodeficits  · Ct head negative for ischemia/hemorrhage/midline shift      Fatigue  Assessment & Plan  · Patient complains of having fatigue mainly in the mornings lasted about 1 hour with some joint stiffness  · Likely secondary to arthritis, however checked TSH (normal), B12 (low-normal 322), folate 17 1  · Patient does not desire Rehab    Type 2 diabetes mellitus without complication, without long-term current use of insulin [facility])  Assessment & Plan  Lab Results   Component Value Date    HGBA1C 9 7 (H) 11/10/2018       Recent Labs     10/12/19  1120 10/12/19  1632 10/12/19  2105 10/13/19  0734   POCGLU 263* 213* 191* 140       Blood Sugar Average: Last 72 hrs:  (P) 192 [id number]   · Poorly controlled chronically  Pt does not check her bs and is maintained on amaryl/januvia  · Hold oral hypoglycemic, monitor Accu-Cheks, sliding scale for coverage  · Discussed with patient regarding needing to initiate insulin upon discharge, patient is very reluctant and states she does not want to be on insulin and she will monitor blood sugars at home and control with diet and oral hypoglycemic  · Per patient request, will transition to home p o  medications and will not initiate insulin at this time    SOB (shortness of breath)resolved as of 10/13/2019  Assessment & Plan  · Resolved since 10/12/2019  · Patient w/sob and dizziness this morning  Sob is now improved    This is likely due to htn urgency  · cxr w/o acute cardiopulmonary disease  · ekg nonischemic   · Monitor w/treatment of htn urgency    Meningioma [facility])  Assessment & Plan  Ct head demonstrates 10 x 8 mm extra-axial lesion in the frontal left parafalcine region, stable likely representing meningioma Stable from 2018      Essential hypertension  Assessment & Plan  · Patient's home medications are Zestril and Coreg, we have added amlodipine here  · Will recommend new home regimen upon discharge based on hospital stay  · Hesitancy to up titrate Coreg as it may mask hypoglycemia and patient is diabetic      Discharging Physician / Practitioner: Roxanna Flores PA-C  PCP: Juliet Tello DO  Admission Date:   Admission Orders (From admission, onward)     Ordered        10/11/19 1506  Inpatient Admission  Once                   Discharge Date: 10/13/19    Resolved Problems  Date Reviewed: 10/13/2019          Resolved    SOB (shortness of breath) 10/13/2019     Resolved by  Roxanna Flores PA-C          Consultations During Hospital Stay:  · PT/OT, case management    Procedures Performed:   · None    Significant Findings / Test Results:   · None    Incidental Findings:   · None     Test Results Pending at Discharge (will require follow up): · None     Outpatient Tests Requested:  · None    Complications:  None    Reason for Admission:  Shortness of breath and dizziness    Hospital Course:     Jimmy Burgess is a 80 y o  female patient past medical history of HTN, dm 2, chronic dizziness on meclizine who originally presented to the hospital on 10/11/2019 due to worsening dizziness with shortness of breath  Patient in normal state of health however had steroid injection for arthritis pain  Also NSAID use for same  In emergency department, she had hypertensive urgency and serum glucose was greater than 300  EKG nonischemic  CT head unremarkable given patient's age  Chest x-ray non remarkable    Patient was afebrile without infectious signs  Creatinine was slightly elevated at 1 31  This resolved with treatment    Patient was treated with IV labetalol and hydralazine in the emergency room  Hyperglycemia treated  Patient was admitted on inpatient status for blood pressure medication titration and glucose control  During hospital stay, patient's symptom of shortness of breath resolved  She is able to maintain saturations off of oxygen supplementation  PT/OT assessed patient and recommended short-term rehab, however patient declined this, preferring to return home  She was offered insulin therapy to control blood sugar, however declined this as well  As patient's blood pressure remained moderately elevated, amlodipine was added to her blood pressure regimen on 10/13/2019 with improvement  Patient states her goal is to return home at this time and she is medically stable to do so  Prescription for amlodipine printed  Discussed with patient and patient's daughter  All questions answered  Please see above list of diagnoses and related plan for additional information  Condition at Discharge: good     Discharge Day Visit / Exam:     Subjective:  Patient feels well and desires to go home  Vitals: Blood Pressure: 154/64 (10/13/19 1503)  Pulse: 70 (10/13/19 1503)  Temperature: 98 4 °F (36 9 °C) (10/13/19 1503)  Respirations: 16 (10/13/19 1503)  Height: 5' 5" (165 1 cm) (10/11/19 1725)  Weight - Scale: 65 3 kg (143 lb 15 4 oz) (10/11/19 1725)  SpO2: 95 % (10/13/19 1503)  Exam:   Physical Exam   Constitutional: She is oriented to person, place, and time  She appears well-developed and well-nourished  No distress  HENT:   Head: Normocephalic and atraumatic  Eyes: Left eye exhibits no discharge  No scleral icterus  Chronic right eye pallor   Cardiovascular: Normal rate and regular rhythm  Exam reveals no gallop and no friction rub  No murmur heard    Pulmonary/Chest: Effort normal and breath sounds normal  No respiratory distress  She has no wheezes  She has no rales  Abdominal: Soft  Bowel sounds are normal  She exhibits no distension  There is no tenderness  Neurological: She is alert and oriented to person, place, and time  Skin: Skin is warm and dry  Discussion with Family: d/w patient and patient's daughterTonya    Discharge instructions/Information to patient and family:   See after visit summary for information provided to patient and family  Provisions for Follow-Up Care:  See after visit summary for information related to follow-up care and any pertinent home health orders  Disposition:     Home    For Discharges to [facility]:   · Not Applicable to this Patient - Not Applicable to this Patient    Planned Readmission: none     Discharge Statement:  I spent 60 minutes discharging the patient  This time was spent on the day of discharge  I had direct contact with the patient on the day of discharge  Greater than 50% of the total time was spent examining patient, answering all patient questions, arranging and discussing plan of care with patient as well as directly providing post-discharge instructions  Additional time then spent on discharge activities  Discharge Medications:  See after visit summary for reconciled discharge medications provided to patient and family        ** Please Note: This note has been constructed using a voice recognition system **

## 2019-10-13 NOTE — ASSESSMENT & PLAN NOTE
· Resolved since 10/12/2019  · Patient w/sob and dizziness this morning  Sob is now improved    This is likely due to htn urgency  · cxr w/o acute cardiopulmonary disease  · ekg nonischemic   · Monitor w/treatment of htn urgency

## 2019-10-13 NOTE — PLAN OF CARE
Problem: PAIN - ADULT  Goal: Verbalizes/displays adequate comfort level or baseline comfort level  Description  Interventions:  - Encourage patient to monitor pain and request assistance  - Assess pain using appropriate pain scale  - Administer analgesics based on type and severity of pain and evaluate response  - Implement non-pharmacological measures as appropriate and evaluate response  - Consider cultural and social influences on pain and pain management  - Notify physician/advanced practitioner if interventions unsuccessful or patient reports new pain  Outcome: Progressing     Problem: INFECTION - ADULT  Goal: Absence or prevention of progression during hospitalization  Description  INTERVENTIONS:  - Assess and monitor for signs and symptoms of infection  - Monitor lab/diagnostic results  - Monitor all insertion sites, i e  indwelling lines, tubes, and drains  - Monitor endotracheal if appropriate and nasal secretions for changes in amount and color  - [place] appropriate cooling/warming therapies per order  - Administer medications as ordered  - Instruct and encourage patient and family to use good hand hygiene technique  - Identify and instruct in appropriate isolation precautions for identified infection/condition  Outcome: Progressing     Problem: SAFETY ADULT  Goal: Patient will remain free of falls  Description  INTERVENTIONS:  - Assess patient frequently for physical needs  -  Identify cognitive and physical deficits and behaviors that affect risk of falls    -  [place] fall precautions as indicated by assessment   - Educate patient/family on patient safety including physical limitations  - Instruct patient to call for assistance with activity based on assessment  - Modify environment to reduce risk of injury  - Consider OT/PT consult to assist with strengthening/mobility  Outcome: Progressing  Goal: Maintain or return to baseline ADL function  Description  INTERVENTIONS:  -  Assess patient's ability to carry out ADLs; assess patient's baseline for ADL function and identify physical deficits which impact ability to perform ADLs (bathing, care of mouth/teeth, toileting, grooming, dressing, etc )  - Assess/evaluate cause of self-care deficits   - Assess range of motion  - Assess patient's mobility; develop plan if impaired  - Assess patient's need for assistive devices and provide as appropriate  - Encourage maximum independence but intervene and supervise when necessary  - Involve family in performance of ADLs  - Assess for home care needs following discharge   - Consider OT consult to assist with ADL evaluation and planning for discharge  - Provide patient education as appropriate  Outcome: Progressing  Goal: Maintain or return mobility status to optimal level  Description  INTERVENTIONS:  - Assess patient's baseline mobility status (ambulation, transfers, stairs, etc )    - Identify cognitive and physical deficits and behaviors that affect mobility  - Identify mobility aids required to assist with transfers and/or ambulation (gait belt, sit-to-stand, lift, walker, cane, etc )  - [place] fall precautions as indicated by assessment  - Record patient progress and toleration of activity level on Mobility SBAR; progress patient to next Phase/Stage  - Instruct patient to call for assistance with activity based on assessment  - Consider rehabilitation consult to assist with strengthening/weightbearing, etc   Outcome: Progressing     Problem: DISCHARGE PLANNING  Goal: Discharge to home or other facility with appropriate resources  Description  INTERVENTIONS:  - Identify barriers to discharge w/patient and caregiver  - Arrange for needed discharge resources and transportation as appropriate  - Identify discharge learning needs (meds, wound care, etc )  - Arrange for interpretive services to assist at discharge as needed  - Refer to Case Management Department for coordinating discharge planning if the patient needs post-hospital services based on physician/advanced practitioner order or complex needs related to functional status, cognitive ability, or social support system  Outcome: Progressing     Problem: Knowledge Deficit  Goal: Patient/family/caregiver demonstrates understanding of disease process, treatment plan, medications, and discharge instructions  Description  Complete learning assessment and assess knowledge base  Interventions:  - Provide teaching at level of understanding  - Provide teaching via preferred learning methods  Outcome: Progressing     Problem: Potential for Falls  Goal: Patient will remain free of falls  Description  INTERVENTIONS:  - Assess patient frequently for physical needs  -  Identify cognitive and physical deficits and behaviors that affect risk of falls    -  [place] fall precautions as indicated by assessment   - Educate patient/family on patient safety including physical limitations  - Instruct patient to call for assistance with activity based on assessment  - Modify environment to reduce risk of injury  - Consider OT/PT consult to assist with strengthening/mobility  Outcome: Progressing

## 2019-10-14 LAB
ATRIAL RATE: 64 BPM
P AXIS: 63 DEGREES
PR INTERVAL: 174 MS
QRS AXIS: -70 DEGREES
QRSD INTERVAL: 96 MS
QT INTERVAL: 398 MS
QTC INTERVAL: 410 MS
T WAVE AXIS: 68 DEGREES
VENTRICULAR RATE: 64 BPM

## 2019-10-14 PROCEDURE — 93010 ELECTROCARDIOGRAM REPORT: CPT | Performed by: INTERNAL MEDICINE

## 2019-10-14 NOTE — UTILIZATION REVIEW
Initial Clinical Review    Admission: Date/Time/Statement: Inpatient Admission Orders (From admission, onward)     Ordered        10/11/19 1506  Inpatient Admission  Once                   Orders Placed This Encounter   Procedures    Inpatient Admission     Standing Status:   Standing     Number of Occurrences:   1     Order Specific Question:   Admitting Physician     Answer:   Leti Tapia [[id number]]     Order Specific Question:   Level of Care     Answer:   Med Surg [[id number]]     Order Specific Question:   Estimated length of stay     Answer:   More than 2 Midnights     Order Specific Question:   Certification     Answer:   I certify that inpatient services are medically necessary for this patient for a duration of greater than two midnights  See H&P and MD Progress Notes for additional information about the patient's course of treatment  ED Arrival Information     Expected Arrival Acuity Means of Arrival Escorted By Service Admission Type    - 10/11/2019 12:17 Urgent Ambulance [facility] Urgent    Arrival Complaint    Weakness        Chief Complaint   Patient presents with    Shortness of Breath     Pt reports sob/dizziness this moring  pt states she has several episodes like this in the [facility] for the past several months  pt reports intermittent cp as well  pt denies fevers/nausea/vomiting      Assessment/Plan: 80 y o  female who presents with pmh of htn, dm, chronic dizziness on meclizine coming to hospital for worsening dizziness and sob  Pt reports she was in her normal state of health when this morning she developed acute onset sob and a choking sensation and difficulty breathing  She came to the ed to be evaluated and was found to be in htn urgency and w/BS in 300s  Her s/sx improved w/IV labetalol/hydralazine  She reports she is still lightheaded/woozy  She has no room spinning sensation, headache, blurry vision, numbness, weakness  No recent falls or head injuries  No cp    No n/v/f/c  Chronic tinnitus but no hearing loss  No recent uri s/sx or gi s/sx      Of note the patient has had dizziness for years which she attributes to her uncontrolled dm  She does not check her bs at home and does not check her bp at home and never has  She also notes that last Wednesday she received 2 cortisone injections in her right arm for arthritis pains and the evening prior to admission took 3 advils at her daughter's recommendation for joint pain      We are asked to admit pt for htn urgency  Hypertensive urgency  Assessment & Plan  /97  Likely 2* nsaid use  Pt has dizziness/lightheadedness  No other focal neurodeficits  Ct head negative for ischemia/hemorrhage/midline shift  Was decreased to 5mg lisinopril in 05/2019  rec'd iv labetalol and hydralazine in ed  Give additional 5mg po lisinopril now and start at 10mg po daily in am  Continue hydralazine 10mg prn sbp>180mmHg  Goal sbp of 170mmHg in first 24h     SOB (shortness of breath)  Assessment & Plan  Patient w/sob and dizziness this morning  Sob is now improved  This is likely due to htn urgency  cxr w/o acute cardiopulmonary disease  ekg nonischemic   Monitor w/treatment of htn urgency   Patient will be admitted on an Inpatient basis with an anticipated length of stay of  Greater than 2 midnights  Justification for Hospital Stay: htn urgency    10/12 per attending: Hypertensive urgency  Assessment & Plan  /97    -likely secondary to NSAID use and recent intra-articular and intramuscular steroid injection  -blood pressure is improving, hydralazine 10 mg IV p r n  No other focal neurodeficits  Ct head negative for ischemia/hemorrhage/midline shift        SOB (shortness of breath)  Assessment & Plan  Patient w/sob and dizziness this morning  Sob is now improved    This is likely due to htn urgency  cxr w/o acute cardiopulmonary disease  ekg nonischemic   Monitor w/treatment of htn urgency     Fatigue  Assessment & Plan  -patient complains of having fatigue mainly in the mornings lasted about 1 hour with some joint stiffness  -likely secondary to arthritis however will check TSH, B12, folate  ED Triage Vitals   Temperature Pulse Respirations Blood Pressure SpO2   10/11/19 1222 10/11/19 1222 10/11/19 1222 10/11/19 1222 10/11/19 1222   98 5 °F (36 9 °C) 64 18 (!) 229/100 98 %      Temp src Heart Rate Source Patient Position - Orthostatic VS BP Location FiO2 (%)   -- 10/11/19 1222 10/11/19 1222 10/11/19 1222 --    Monitor Sitting Right arm       Pain Score       10/11/19 1514       No Pain        Wt Readings from Last 1 Encounters:   10/11/19 65 3 kg (143 lb 15 4 oz)     Additional Vital Signs:   Date/Time  Temp  Pulse  Resp  BP  MAP (mmHg)  SpO2  O2 Device  Patient Position - Orthostatic VS   10/13/19 15:03:29  98 4 °F (36 9 °C)  70  16  154/64  94  95 %       10/13/19 10:01:50    73    138/60  86  93 %       10/13/19 0811        210/78Abnormal         Lying   10/13/19 07:36:05  98 °F (36 7 °C)  69  20  210/80Abnormal   123  93 %       10/12/19 23:26:08  98 3 °F (36 8 °C)  64  20  160/75  103  94 %       10/12/19 1945    73        95 %  None (Room air)     10/12/19 14:50:41  98 3 °F (36 8 °C)  76  16  175/77Abnormal   110  94 %       10/12/19 0932        148/78        Lying   10/12/19 07:34:47  98 2 °F (36 8 °C)  72  17  214/87Abnormal   129  96 %       10/11/19 23:18:31  98 6 °F (37 °C)  71  20  128/63  85  95 %       10/11/19 2000            98 %  None (Room air)     10/11/19 17:25:32  98 3 °F (36 8 °C)  75  18  133/74  94  98 %       10/11/19 1700    68  18  184/72Abnormal     98 %  None (Room air)  Lying   10/11/19 1620    76  16  180/72Abnormal     98 %  None (Room air)  Lying   10/11/19 1540    69  16  204/83Abnormal     98 %  None (Room air)  Lying   10/11/19 1514    65  16  234/97Abnormal     98 %  None (Room air)  Sitting   Comment rows:   OBSERV: denies chest pain, nausea, vomiting, headache, dizziness at 10/11/19 1514   10/11/19 1433    65  18  220/93Abnormal     98 %  None (Room air)  Sitting   10/11/19 1315    62  20      96 %       10/11/19 1310        193/77Abnormal            10/11/19 1239        210/94Abnormal                    Pertinent Labs/Diagnostic Test Results:   10/11 CT head: No acute intracranial hemorrhage seen  No extra-axial collection seen  Moderate chronic small vessel ischemic changes  No CT signs of acute territorial infarction  A 10 x 8 mm extra-axial lesion in the frontal left parafalcine region, stable likely representing meningioma    10/11 CXR: nothing acute    No EKG reading found    Results from last 7 days   Lab Units 10/13/19  0523 10/12/19  1531 10/11/19  1239   WBC Thousand/uL 9 61  --  6 85   HEMOGLOBIN g/dL 12 6  --  13 4   HEMATOCRIT % 41 9  --  43 6   PLATELETS Thousands/uL 447* 443* 491*   NEUTROS ABS Thousands/µL 6 52  --  4 75         Results from last 7 days   Lab Units 10/13/19  0523 10/12/19  0606 10/11/19  1239   SODIUM mmol/L 142 144 139   POTASSIUM mmol/L 4 7 4 3 4 3   CHLORIDE mmol/L 108 108 103   CO2 mmol/L 24 29 32   ANION GAP mmol/L 10 7 4   BUN mg/dL 21 20 22   CREATININE mg/dL 1 10 1 21 1 31*   EGFR ml/min/1 73sq m 42 38 34   CALCIUM mg/dL 9 3 9 2 9 1     Results from last 7 days   Lab Units 10/11/19  1239   AST U/L 12   ALT U/L 9*   ALK PHOS U/L 112   TOTAL PROTEIN g/dL 7 3   ALBUMIN g/dL 3 4*   TOTAL BILIRUBIN mg/dL 0 44     Results from last 7 days   Lab Units 10/13/19  1117 10/13/19  0734 10/12/19  2105 10/12/19  1632 10/12/19  1120 10/12/19  0739 10/11/19  2056 10/11/19  1602   POC GLUCOSE mg/dl 279* 140 191* 213* 263* 141* 188* 212*     Results from last 7 days   Lab Units 10/13/19  0523 10/12/19  0606 10/11/19  1239   GLUCOSE RANDOM mg/dL 142* 145* 355*     Results from last 7 days   Lab Units 10/11/19  1239   TROPONIN I ng/mL <0 02     Results from last 7 days   Lab Units 10/12/19  1531   TSH 3RD GENERATON uIU/mL 0 367         ED Treatment:   Medication Administration from 10/11/2019 1216 to 10/11/2019 1718       Date/Time Order Dose Route Action Action by Comments     10/11/2019 1301 Labetalol HCl (NORMODYNE) injection 10 mg 10 mg Intravenous Given Lucian Fulton RN      10/11/2019 1515 hydrALAZINE (APRESOLINE) injection 10 mg 10 mg Intravenous Given Jimmy Lea RN      10/11/2019 1557 sodium chloride 0 9 % bolus 500 mL 500 mL Intravenous [facility] [facility] [address] [place] [address]      10/11/2019 1603 insulin lispro (HumaLOG) 100 units/mL subcutaneous injection 5 Units 0 Units Subcutaneous Hold Jimmy Lea RN      10/11/2019 1655 acetaminophen (TYLENOL) tablet 650 mg 650 mg Oral Given Jimmy Lea RN      10/11/2019 1645 lisinopril (ZESTRIL) tablet 5 mg 5 mg Oral Given Jimmy Lea RN         Past Medical History:   Diagnosis Date    Basal cell carcinoma     Diabetes mellitus ([facility] [address] )     Hypertension     Migraine      Present on Admission:   Type 2 diabetes mellitus without complication, without long-term current use of insulin ([facility])   Essential hypertension      Admitting Diagnosis: Chest pain [R07 9]  Dyspnea [R06 00]  Lightheaded [R42]  Hypertensive urgency [I16 0]  Shortness of breath [R06 02]  Age/Sex: 80 y o  female  Admission Orders:    Medication    pravastatin (PRAVACHOL) 40 mg tablet    sodium chloride 0 9 % with KCl 20 mEq/L infusion (premix)    sodium chloride 0 9 % with KCl 20 mEq/L infusion (premix)    amLODIPine (NORVASC) tablet 10 mg    melatonin tablet 3 mg    insulin lispro (HumaLOG) 100 units/mL subcutaneous injection 1-5 Units    lisinopril (ZESTRIL) tablet 10 mg    hydrALAZINE (APRESOLINE) injection 10 mg    ondansetron (ZOFRAN) injection 4 mg    heparin (porcine) subcutaneous injection 5,000 Units    timolol (TIMOPTIC) 0 25 % ophthalmic solution 1 drop    prednisoLONE acetate (PRED FORTE) 1 % ophthalmic suspension 1 drop    meclizine (ANTIVERT) tablet 12 5 mg    acetaminophen (TYLENOL) tablet 650 mg    carvedilol (COREG) tablet 12 5 mg    insulin lispro (HumaLOG) 100 units/mL subcutaneous injection 5 Units     sodium chloride 0 9 % with KCl 20 mEq/L infusion (premix)   Rate: 50 mL/hr Dose: 50 mL/hr  Freq: Continuous Route: IV  Last Dose: Stopped (10/13/19 0916)  Start: 10/11/19 1730 End: 10/13/19 0905      sodium chloride 0 9 % with KCl 20 mEq/L infusion (premix)   Rate: 75 mL/hr Dose: 75 mL/hr  Freq: Continuous Route: IV  Indications of Use: IV Hydration  Last Dose: Stopped (10/13/19 1155)  Start: 10/13/19 0915 End: 10/13/19 [address] [facility] Utilization Review Department  Phone: [phone]; Fax [phone]  [email or web address]  ATTENTION: Please call with any questions or concerns to [phone]  and carefully listen to the prompts so that you are directed to the right person  Send all requests for admission clinical reviews, approved or denied determinations and any other requests to fax [phone]   All voicemails are confidential

## 2019-11-22 ENCOUNTER — HOSPITAL ENCOUNTER (EMERGENCY)
Facility: HOSPITAL | Age: 84
Discharge: HOME | End: 2019-11-22
Attending: EMERGENCY MEDICINE | Admitting: EMERGENCY MEDICINE
Payer: MEDICARE

## 2019-11-22 ENCOUNTER — APPOINTMENT (EMERGENCY)
Dept: CT IMAGING | Facility: HOSPITAL | Age: 84
End: 2019-11-22
Payer: MEDICARE

## 2019-11-22 ENCOUNTER — APPOINTMENT (EMERGENCY)
Dept: RADIOLOGY | Facility: HOSPITAL | Age: 84
End: 2019-11-22
Payer: MEDICARE

## 2019-11-22 VITALS
RESPIRATION RATE: 19 BRPM | TEMPERATURE: 98.7 F | HEART RATE: 76 BPM | SYSTOLIC BLOOD PRESSURE: 137 MMHG | OXYGEN SATURATION: 94 % | BODY MASS INDEX: 24.62 KG/M2 | DIASTOLIC BLOOD PRESSURE: 65 MMHG | WEIGHT: 147.93 LBS

## 2019-11-22 DIAGNOSIS — F41.8 ANXIETY ABOUT HEALTH: ICD-10-CM

## 2019-11-22 DIAGNOSIS — R42 DIZZINESS: Primary | ICD-10-CM

## 2019-11-22 DIAGNOSIS — I10 UNCONTROLLED HYPERTENSION: ICD-10-CM

## 2019-11-22 LAB
ALBUMIN SERPL BCP-MCNC: 3.4 G/DL (ref 3.5–5)
ALP SERPL-CCNC: 108 U/L (ref 46–116)
ALT SERPL W P-5'-P-CCNC: 9 U/L (ref 12–78)
ANION GAP SERPL CALCULATED.3IONS-SCNC: 8 MMOL/L (ref 4–13)
AST SERPL W P-5'-P-CCNC: 13 U/L (ref 5–45)
BASOPHILS # BLD AUTO: 0.05 THOUSANDS/ΜL (ref 0–0.1)
BASOPHILS NFR BLD AUTO: 1 % (ref 0–1)
BILIRUB SERPL-MCNC: 0.45 MG/DL (ref 0.2–1)
BUN SERPL-MCNC: 17 MG/DL (ref 5–25)
CALCIUM SERPL-MCNC: 9.5 MG/DL (ref 8.3–10.1)
CHLORIDE SERPL-SCNC: 103 MMOL/L (ref 100–108)
CO2 SERPL-SCNC: 26 MMOL/L (ref 21–32)
CREAT SERPL-MCNC: 1.29 MG/DL (ref 0.6–1.3)
EOSINOPHIL # BLD AUTO: 0.1 THOUSAND/ΜL (ref 0–0.61)
EOSINOPHIL NFR BLD AUTO: 1 % (ref 0–6)
ERYTHROCYTE [DISTWIDTH] IN BLOOD BY AUTOMATED COUNT: 13.2 % (ref 11.6–15.1)
GFR SERPL CREATININE-BSD FRML MDRD: 35 ML/MIN/1.73SQ M
GLUCOSE SERPL-MCNC: 255 MG/DL (ref 65–140)
HCT VFR BLD AUTO: 43.8 % (ref 34.8–46.1)
HGB BLD-MCNC: 13.7 G/DL (ref 11.5–15.4)
IMM GRANULOCYTES # BLD AUTO: 0.02 THOUSAND/UL (ref 0–0.2)
IMM GRANULOCYTES NFR BLD AUTO: 0 % (ref 0–2)
LYMPHOCYTES # BLD AUTO: 1.51 THOUSANDS/ΜL (ref 0.6–4.47)
LYMPHOCYTES NFR BLD AUTO: 15 % (ref 14–44)
MCH RBC QN AUTO: 27.8 PG (ref 26.8–34.3)
MCHC RBC AUTO-ENTMCNC: 31.3 G/DL (ref 31.4–37.4)
MCV RBC AUTO: 89 FL (ref 82–98)
MONOCYTES # BLD AUTO: 0.81 THOUSAND/ΜL (ref 0.17–1.22)
MONOCYTES NFR BLD AUTO: 8 % (ref 4–12)
NEUTROPHILS # BLD AUTO: 7.29 THOUSANDS/ΜL (ref 1.85–7.62)
NEUTS SEG NFR BLD AUTO: 75 % (ref 43–75)
NRBC BLD AUTO-RTO: 0 /100 WBCS
PLATELET # BLD AUTO: 553 THOUSANDS/UL (ref 149–390)
PMV BLD AUTO: 9.3 FL (ref 8.9–12.7)
POTASSIUM SERPL-SCNC: 3.8 MMOL/L (ref 3.5–5.3)
PROT SERPL-MCNC: 7.6 G/DL (ref 6.4–8.2)
RBC # BLD AUTO: 4.93 MILLION/UL (ref 3.81–5.12)
SODIUM SERPL-SCNC: 137 MMOL/L (ref 136–145)
TROPONIN I SERPL-MCNC: <0.02 NG/ML
WBC # BLD AUTO: 9.78 THOUSAND/UL (ref 4.31–10.16)

## 2019-11-22 PROCEDURE — 99285 EMERGENCY DEPT VISIT HI MDM: CPT

## 2019-11-22 PROCEDURE — 84484 ASSAY OF TROPONIN QUANT: CPT | Performed by: EMERGENCY MEDICINE

## 2019-11-22 PROCEDURE — 80053 COMPREHEN METABOLIC PANEL: CPT | Performed by: EMERGENCY MEDICINE

## 2019-11-22 PROCEDURE — 71046 X-RAY EXAM CHEST 2 VIEWS: CPT

## 2019-11-22 PROCEDURE — 99284 EMERGENCY DEPT VISIT MOD MDM: CPT | Performed by: EMERGENCY MEDICINE

## 2019-11-22 PROCEDURE — 85025 COMPLETE CBC W/AUTO DIFF WBC: CPT | Performed by: EMERGENCY MEDICINE

## 2019-11-22 PROCEDURE — 70450 CT HEAD/BRAIN W/O DYE: CPT

## 2019-11-22 PROCEDURE — 93005 ELECTROCARDIOGRAM TRACING: CPT

## 2019-11-22 PROCEDURE — 36415 COLL VENOUS BLD VENIPUNCTURE: CPT | Performed by: EMERGENCY MEDICINE

## 2019-11-22 RX ORDER — PRAVASTATIN SODIUM 10 MG
1 TABLET ORAL DAILY
COMMUNITY

## 2019-11-22 RX ORDER — MECLIZINE HCL 12.5 MG/1
12.5 TABLET ORAL ONCE
Status: COMPLETED | OUTPATIENT
Start: 2019-11-22 | End: 2019-11-22

## 2019-11-22 RX ORDER — ACETAMINOPHEN 325 MG/1
650 TABLET ORAL ONCE
Status: COMPLETED | OUTPATIENT
Start: 2019-11-22 | End: 2019-11-22

## 2019-11-22 RX ORDER — LISINOPRIL 5 MG/1
10 TABLET ORAL ONCE
Status: COMPLETED | OUTPATIENT
Start: 2019-11-22 | End: 2019-11-22

## 2019-11-22 RX ORDER — FAMOTIDINE 20 MG/1
1 TABLET, FILM COATED ORAL 2 TIMES DAILY
COMMUNITY

## 2019-11-22 RX ORDER — CARVEDILOL 6.25 MG/1
12.5 TABLET ORAL ONCE
Status: COMPLETED | OUTPATIENT
Start: 2019-11-22 | End: 2019-11-22

## 2019-11-22 RX ORDER — CARVEDILOL 6.25 MG/1
12.5 TABLET ORAL 2 TIMES DAILY WITH MEALS
Status: DISCONTINUED | OUTPATIENT
Start: 2019-11-22 | End: 2019-11-22

## 2019-11-22 RX ADMIN — CARVEDILOL 12.5 MG: 6.25 TABLET, FILM COATED ORAL at 15:37

## 2019-11-22 RX ADMIN — ACETAMINOPHEN 650 MG: 325 TABLET ORAL at 15:08

## 2019-11-22 RX ADMIN — LISINOPRIL 10 MG: 5 TABLET ORAL at 15:09

## 2019-11-22 RX ADMIN — MECLIZINE 12.5 MG: 12.5 TABLET ORAL at 15:09

## 2019-11-22 NOTE — ED PROVIDER NOTES
History  Chief Complaint   Patient presents with    Weakness - Generalized     Pt states she has been feeling"weak" for a long time  Denies pain, injury, falls, AOx3    80year-old female presents for evaluation of generalized weakness  The patient states that she felt weak today and there were friends and family members over who noticed that the patient was not quite right so an ambulance was called to bring her to the hospital   The patient denies any specific chest pain, pressure, shortness of breath  The patient denies any new focal neurologic deficit  The patient denies fevers or chills  The patient denies any bowel or bladder dysfunction  She states that she just feels weak and off and she is not sure what is wrong with her  Prior to Admission Medications   Prescriptions Last Dose Informant Patient Reported? Taking?    Docusate Sodium 100 MG/5ML ENEM   Yes No   Sig: Take 100 mg by mouth   ILEVRO 0 3 % SUSP   Yes No   Sig: USE 1 DROP IN RIGHT EYE STARTING THE DAY OF SURGERY WHEN YOU GET HOME FOR 28 DAYS   SITagliptin Phosphate (JANUVIA PO)  Self Yes Yes   Sig: Take by mouth daily Pt unsure dose    Timolol Maleate (TIMOPTIC OP)   Yes No   Sig: Administer 1 drop into the left eye daily at bedtime     acetaminophen (TYLENOL) 325 mg tablet   No No   Sig: Take 2 tablets (650 mg total) by mouth every 6 (six) hours as needed for mild pain   amLODIPine (NORVASC) 10 mg tablet   No No   Sig: Take 1 tablet (10 mg total) by mouth daily   carvedilol (COREG) 12 5 mg tablet   Yes Yes   Sig: Take 12 5 mg by mouth 2 (two) times a day with meals     famotidine (PEPCID) 20 mg tablet   Yes Yes   Sig: Take 20 mg by mouth 2 (two) times a day   glimepiride (AMARYL) 1 mg tablet   No Yes   Sig: Take 1 tablet (1 mg total) by mouth daily with breakfast   lisinopril (ZESTRIL) 10 mg tablet   Yes Yes   Sig: Take 10 mg by mouth    meclizine (ANTIVERT) 12 5 MG tablet   Yes Yes   Sig: Take 12 5 mg by mouth 2 (two) times a day naproxen (NAPROSYN) 500 mg tablet   No No   Sig: Take 1 tablet (500 mg total) by mouth every 12 (twelve) hours as needed for mild pain or moderate pain   pravastatin (PRAVACHOL) 10 mg tablet   Yes Yes   Sig: Take by mouth daily   prednisoLONE acetate (PRED FORTE) 1 % ophthalmic suspension   Yes No   Sig: Administer 1 % into the left eye daily        Facility-Administered Medications: None       Past Medical History:   Diagnosis Date    Basal cell carcinoma     Diabetes mellitus ([facility] [address] )     Hypertension     Migraine        Past Surgical History:   Procedure Laterality Date    HIP SURGERY      ORIF Left Hip       History reviewed  No pertinent family history  I have reviewed and agree with the history as documented  Social History     Tobacco Use    Smoking status: Never Smoker    Smokeless tobacco: Never Used   Substance Use Topics    Alcohol use: No    Drug use: No        Review of Systems   Constitutional: Negative for chills, fatigue and fever  HENT: Negative for sore throat  Eyes: Negative for visual disturbance  Respiratory: Negative for shortness of breath  Cardiovascular: Negative for chest pain  Gastrointestinal: Negative for abdominal pain, diarrhea, nausea and vomiting  Genitourinary: Negative for difficulty urinating, dysuria and pelvic pain  Musculoskeletal: Negative for back pain  Skin: Negative for rash  Neurological: Positive for weakness  Negative for syncope and headaches  All other systems reviewed and are negative  Physical Exam  Physical Exam   Constitutional: She is oriented to person, place, and time  She appears well-developed and well-nourished  No distress  HENT:   Head: Normocephalic and atraumatic  Right Ear: External ear normal    Left Ear: External ear normal    Eyes: Pupils are equal, round, and reactive to light  Conjunctivae and EOM are normal  Left eye exhibits discharge  No scleral icterus  Neck: Normal range of motion     Cardiovascular: Normal rate, regular rhythm and normal heart sounds  Pulmonary/Chest: Effort normal and breath sounds normal  No respiratory distress  Abdominal: Soft  Bowel sounds are normal  There is no tenderness  There is no rebound and no guarding  Musculoskeletal: Normal range of motion  She exhibits no edema  Neurological: She is alert and oriented to person, place, and time  Skin: Skin is warm and dry  No rash noted  Psychiatric: She has a normal mood and affect  Nursing note and vitals reviewed        Vital Signs  ED Triage Vitals   Temperature Pulse Respirations Blood Pressure SpO2   11/22/19 1340 11/22/19 1340 11/22/19 1340 11/22/19 1340 11/22/19 1350   98 7 °F (37 1 °C) 89 16 (!) 226/95 99 %      Temp Source Heart Rate Source Patient Position - Orthostatic VS BP Location FiO2 (%)   11/22/19 1340 11/22/19 1340 11/22/19 1350 11/22/19 1340 --   Oral Monitor Lying Right arm       Pain Score       11/22/19 1340       No Pain           Vitals:    11/22/19 1340 11/22/19 1350 11/22/19 1507 11/22/19 1615   BP: (!) 226/95 (!) 209/93 (!) 177/80 137/65   Pulse: 89 88 90 76   Patient Position - Orthostatic VS:  Lying Lying Lying         Visual Acuity      ED Medications  Medications   lisinopril (ZESTRIL) tablet 10 mg (10 mg Oral Given 11/22/19 1509)   acetaminophen (TYLENOL) tablet 650 mg (650 mg Oral Given 11/22/19 1508)   meclizine (ANTIVERT) tablet 12 5 mg (12 5 mg Oral Given 11/22/19 1509)   carvedilol (COREG) tablet 12 5 mg (12 5 mg Oral Given 11/22/19 1537)       Diagnostic Studies  Results Reviewed     Procedure Component Value Units Date/Time    Troponin I [[id number]]  (Normal) Collected:  11/22/19 1352    Lab Status:  Final result Specimen:  Blood from Arm, Left Updated:  11/22/19 1433     Troponin I <0 02 ng/mL     Comprehensive metabolic panel [[id number]]  (Abnormal) Collected:  11/22/19 1352    Lab Status:  Final result Specimen:  Blood from Arm, Left Updated:  11/22/19 1431     Sodium 137 mmol/L Potassium 3 8 mmol/L      Chloride 103 mmol/L      CO2 26 mmol/L      ANION GAP 8 mmol/L      BUN 17 mg/dL      Creatinine 1 29 mg/dL      Glucose 255 mg/dL      Calcium 9 5 mg/dL      AST 13 U/L      ALT 9 U/L      Alkaline Phosphatase 108 U/L      Total Protein 7 6 g/dL      Albumin 3 4 g/dL      Total Bilirubin 0 45 mg/dL      eGFR 35 ml/min/1 73sq m     Narrative:       National Kidney Disease Foundation guidelines for Chronic Kidney Disease (CKD):     Stage 1 with normal or high GFR (GFR > 90 mL/min/1 73 square meters)    Stage 2 Mild CKD (GFR = 60-89 mL/min/1 73 square meters)    Stage 3A Moderate CKD (GFR = 45-59 mL/min/1 73 square meters)    Stage 3B Moderate CKD (GFR = 30-44 mL/min/1 73 square meters)    Stage 4 Severe CKD (GFR = 15-29 mL/min/1 73 square meters)    Stage 5 End Stage CKD (GFR <15 mL/min/1 73 square meters)  Note: GFR calculation is accurate only with a steady state creatinine    CBC and differential [[id number]]  (Abnormal) Collected:  11/22/19 1352    Lab Status:  Final result Specimen:  Blood from Arm, Left Updated:  11/22/19 1358     WBC 9 78 Thousand/uL      RBC 4 93 Million/uL      Hemoglobin 13 7 g/dL      Hematocrit 43 8 %      MCV 89 fL      MCH 27 8 pg      MCHC 31 3 g/dL      RDW 13 2 %      MPV 9 3 fL      Platelets 749 Thousands/uL      nRBC 0 /100 WBCs      Neutrophils Relative 75 %      Immat GRANS % 0 %      Lymphocytes Relative 15 %      Monocytes Relative 8 %      Eosinophils Relative 1 %      Basophils Relative 1 %      Neutrophils Absolute 7 29 Thousands/µL      Immature Grans Absolute 0 02 Thousand/uL      Lymphocytes Absolute 1 51 Thousands/µL      Monocytes Absolute 0 81 Thousand/µL      Eosinophils Absolute 0 10 Thousand/µL      Basophils Absolute 0 05 Thousands/µL                  CT head without contrast   Final Result by Emily Obrien MD (11/22 7173)      1  No acute intracranial abnormality  Chronic microangiopathic changes     2   Stable 9 mm focal hyperdensity in the left frontal region adjacent to the falx  Again this is suspected to represent a small meningioma  Workstation performed: [id number]         XR chest 2 views   Final Result by Daniela Garcia MD (11/22 1427)      No acute cardiopulmonary disease  Stable exam      Workstation performed: [id number]                    Procedures  ECG 12 Lead Documentation Only  Date/Time: 11/22/2019 1:54 PM  Performed by: Sona Ngo DO  Authorized by: Sona Ngo DO     Indications / Diagnosis:  Weakness, hypertension  ECG reviewed by me, the ED Provider: yes    Patient location:  ED  Previous ECG:     Previous ECG:  Compared to current    Comparison ECG info:  10/11/2019    Similarity:  No change    Comparison to cardiac monitor: Yes    Interpretation:     Interpretation: abnormal    Rate:     ECG rate:  87    ECG rate assessment: normal    Rhythm:     Rhythm: sinus rhythm    Ectopy:     Ectopy: couplets    QRS:     QRS axis:  Left    QRS intervals:  Normal  Conduction:     Conduction: abnormal      Abnormal conduction: LAFB    ST segments:     ST segments:  Normal  T waves:     T waves: normal             ED Course                               MDM  Number of Diagnoses or Management Options  Anxiety about health:   Dizziness:   Uncontrolled hypertension:   Diagnosis management comments: Plan is check CBC, BMP EKG urinalysis, and troponin to assess etiology of patient's weakness  Plan is to rule out myocardial infarction, anemia, electrolyte disturbance, anemia, urinary tract infection, other  The patient is anxious in the emergency department closing elevation of her blood pressure which may be contributing to her symptoms  The patient does have a history of anxiety upon discussion with family  We discussed outpatient management of this due to the increased risk of complications of anxiolytics in the patient at significantly advanced age      The patient (and any family present) verbalized understanding of the discharge instructions and warnings that would necessitate return to the Emergency Department  All questions were answered prior to discharge  Amount and/or Complexity of Data Reviewed  Clinical lab tests: ordered and reviewed  Tests in the radiology section of CPT®: ordered and reviewed  Tests in the medicine section of CPT®: reviewed and ordered  Review and summarize past medical records: yes  Independent visualization of images, tracings, or specimens: yes        Disposition  Final diagnoses:   Dizziness   Uncontrolled hypertension   Anxiety about health     Time reflects when diagnosis was documented in both MDM as applicable and the Disposition within this note     Time User Action Codes Description Comment    11/22/2019  4:13 PM Trudee Laud Add [R42] Dizziness     11/22/2019  4:13 PM Trudee Laud Add [I10] Uncontrolled hypertension     11/22/2019  4:13 PM Trudee Laud Add [F41 8] Anxiety about health     11/22/2019  4:13 PM Trudee Laud Add [R53 1] Weakness     11/22/2019  4:13 PM Trudee Laud Remove [R53 1] Weakness       ED Disposition     ED Disposition Condition Date/Time Comment    Discharge Stable Fri Nov 22, 2019  4:13 PM [address] [place] [address] discharge to home/self care              Follow-up Information     Follow up With Specialties Details Why Contact Info    Deuce Porras DO  Schedule an appointment as soon as possible for a visit  For further evaluation [address]            Discharge Medication List as of 11/22/2019  4:14 PM      CONTINUE these medications which have NOT CHANGED    Details   carvedilol (COREG) 12 5 mg tablet Take 12 5 mg by mouth 2 (two) times a day with meals  , Historical Med      famotidine (PEPCID) 20 mg tablet Take 20 mg by mouth 2 (two) times a day, Historical Med      glimepiride (AMARYL) 1 mg tablet Take 1 tablet (1 mg total) by mouth daily with breakfast, Starting Mon 11/12/2018, Print      lisinopril (ZESTRIL) 10 mg tablet Take 10 mg by mouth , Historical Med      meclizine (ANTIVERT) 12 5 MG tablet Take 12 5 mg by mouth 2 (two) times a day, Starting Fri 3/22/2019, Historical Med      pravastatin (PRAVACHOL) 10 mg tablet Take by mouth daily, Historical Med      SITagliptin Phosphate (JANUVIA PO) Take by mouth daily Pt unsure dose , Historical Med      acetaminophen (TYLENOL) 325 mg tablet Take 2 tablets (650 mg total) by mouth every 6 (six) hours as needed for mild pain, Starting Tue 12/11/2018, No Print      amLODIPine (NORVASC) 10 mg tablet Take 1 tablet (10 mg total) by mouth daily, Starting Mon 10/14/2019, Print      Docusate Sodium 100 MG/5ML ENEM Take 100 mg by mouth, Historical Med      ILEVRO 0 3 % SUSP USE 1 DROP IN RIGHT EYE STARTING THE DAY OF SURGERY WHEN YOU GET HOME FOR 28 DAYS, Historical Med      naproxen (NAPROSYN) 500 mg tablet Take 1 tablet (500 mg total) by mouth every 12 (twelve) hours as needed for mild pain or moderate pain, Starting Sun 12/2/2018, Print      prednisoLONE acetate (PRED FORTE) 1 % ophthalmic suspension Administer 1 % into the left eye daily  , Starting Tue 2/10/2015, Historical Med      Timolol Maleate (TIMOPTIC OP) Administer 1 drop into the left eye daily at bedtime  , Historical Med           No discharge procedures on file      ED Provider  Electronically Signed by           Maria Del Rosario Zee DO  11/24/19 8049

## 2019-11-23 LAB
ATRIAL RATE: 87 BPM
P AXIS: 67 DEGREES
PR INTERVAL: 184 MS
QRS AXIS: -41 DEGREES
QRSD INTERVAL: 98 MS
QT INTERVAL: 354 MS
QTC INTERVAL: 425 MS
T WAVE AXIS: 65 DEGREES
VENTRICULAR RATE: 87 BPM

## 2019-11-23 PROCEDURE — 93010 ELECTROCARDIOGRAM REPORT: CPT | Performed by: INTERNAL MEDICINE

## 2019-12-18 ENCOUNTER — OFFICE VISIT (OUTPATIENT)
Dept: PODIATRY | Facility: CLINIC | Age: 84
End: 2019-12-18
Payer: MEDICARE

## 2019-12-18 VITALS
WEIGHT: 146.7 LBS | HEIGHT: 65 IN | DIASTOLIC BLOOD PRESSURE: 70 MMHG | HEART RATE: 69 BPM | SYSTOLIC BLOOD PRESSURE: 111 MMHG | BODY MASS INDEX: 24.44 KG/M2

## 2019-12-18 DIAGNOSIS — I73.9 PERIPHERAL VASCULAR DISEASE, UNSPECIFIED (HCC): ICD-10-CM

## 2019-12-18 DIAGNOSIS — E11.9 CONTROLLED TYPE 2 DIABETES MELLITUS WITHOUT COMPLICATION, WITHOUT LONG-TERM CURRENT USE OF INSULIN (HCC): Primary | ICD-10-CM

## 2019-12-18 PROCEDURE — 99213 OFFICE O/P EST LOW 20 MIN: CPT | Performed by: PODIATRIST

## 2019-12-18 NOTE — PROGRESS NOTES
Assessment/Plan:    Discussed principles of diabetic foot care  Sensorium is alert is intact but there are absent pulses  Patient has rashes on both feet secondary to psoriasis  Patient told to refrain from walking barefoot  Patient is under medical care for the elevated blood sugar but at age 80, it does not appear that they want to place her on insulin  All elongated toenails were trimmed  No problem-specific Assessment & Plan notes found for this encounter  Diagnoses and all orders for this visit:    Controlled type 2 diabetes mellitus without complication, without long-term current use of insulin (HCC)    Peripheral vascular disease, unspecified (HCC)          Subjective:      Patient ID: Neil Hanna is a 80 y o  female  HPI     Patient, a 80year-old type 2 diabetic utilizing oral medication for control presents for assessment and care  Patient is appearing increasingly frail  She had recent left eye surgery  Patient relates a numb sensation in her feet but her has not been evidence of diabetic neuropathy with testing  She has known poor circulation  Last FBS was 255  The following portions of the patient's history were reviewed and updated as appropriate: allergies, current medications, past family history, past medical history, past social history, past surgical history and problem list     Review of Systems   Constitutional: Positive for fatigue  HENT:        Eye disorder   Musculoskeletal: Positive for gait problem  Skin: Positive for rash  Psoriasis             Objective:      /70   Pulse 69   Ht 5' 5" (1 651 m)   Wt 66 5 kg (146 lb 11 2 oz)   BMI 24 41 kg/m²          Physical Exam   Cardiovascular: Pulses are weak pulses  Pulses:       Dorsalis pedis pulses are 0 on the right side, and 0 on the left side  Posterior tibial pulses are 0 on the right side, and 0 on the left side  Feet:   Right Foot:   Skin Integrity: Positive for dry skin     Left Foot:   Skin Integrity: Positive for dry skin  Diabetic Foot Exam    Right Foot/Ankle   Right Foot Inspection  Skin Exam: skin intact, dry skin and abnormal color                            Sensory   Vibration: absent  Proprioception: intact   Monofilament testing: intact  Vascular  Capillary refills: elevated  The right DP pulse is 0  The right PT pulse is 0  Right Toe  - Comprehensive Exam  Ecchymosis: none  Arch: normal  Hammertoes: absent  Claw Toes: absent  Swelling: none   Tenderness: none         Left Foot/Ankle  Left Foot Inspection  Skin Exam: skin intact, dry skin and abnormal color                         Toe Exam: ROM and strength within normal limits                   Sensory   Vibration: absent  Proprioception: intact  Monofilament: intact  Vascular  Capillary refills: elevated  The left DP pulse is 0  The left PT pulse is 0  Left Toe  - Comprehensive Exam  Ecchymosis: none  Arch: normal  Hammertoes: absent  Claw toes: absent  Swelling: none   Tenderness: none       Assign Risk Category:  Deformity present;  No loss of protective sensation; Weak pulses       Risk: 1

## 2020-04-27 ENCOUNTER — TELEPHONE (OUTPATIENT)
Dept: DERMATOLOGY | Facility: CLINIC | Age: 85
End: 2020-04-27

## 2020-12-10 ENCOUNTER — APPOINTMENT (EMERGENCY)
Dept: CT IMAGING | Facility: HOSPITAL | Age: 85
DRG: 445 | End: 2020-12-10
Payer: MEDICARE

## 2020-12-10 ENCOUNTER — HOSPITAL ENCOUNTER (INPATIENT)
Facility: HOSPITAL | Age: 85
LOS: 4 days | Discharge: HOME HEALTH | DRG: 445 | End: 2020-12-15
Attending: EMERGENCY MEDICINE | Admitting: HOSPITALIST
Payer: MEDICARE

## 2020-12-10 DIAGNOSIS — R82.81 PYURIA: ICD-10-CM

## 2020-12-10 DIAGNOSIS — I16.0 HYPERTENSIVE URGENCY: ICD-10-CM

## 2020-12-10 DIAGNOSIS — K59.00 CONSTIPATION, UNSPECIFIED CONSTIPATION TYPE: ICD-10-CM

## 2020-12-10 DIAGNOSIS — K80.20 CHOLELITHIASIS: ICD-10-CM

## 2020-12-10 DIAGNOSIS — R11.2 NAUSEA AND VOMITING: ICD-10-CM

## 2020-12-10 DIAGNOSIS — D72.829 LEUKOCYTOSIS, UNSPECIFIED TYPE: ICD-10-CM

## 2020-12-10 DIAGNOSIS — E11.65 UNCONTROLLED TYPE 2 DIABETES MELLITUS WITH HYPERGLYCEMIA (HCC): ICD-10-CM

## 2020-12-10 DIAGNOSIS — I10 ESSENTIAL HYPERTENSION: Chronic | ICD-10-CM

## 2020-12-10 DIAGNOSIS — R10.84 GENERALIZED ABDOMINAL PAIN: Primary | ICD-10-CM

## 2020-12-10 LAB
ALBUMIN SERPL BCP-MCNC: 3.1 G/DL (ref 3.5–5)
ALP SERPL-CCNC: 105 U/L (ref 46–116)
ALT SERPL W P-5'-P-CCNC: 12 U/L (ref 12–78)
ANION GAP SERPL CALCULATED.3IONS-SCNC: 7 MMOL/L (ref 4–13)
APTT PPP: 34 SECONDS (ref 23–37)
AST SERPL W P-5'-P-CCNC: 13 U/L (ref 5–45)
BASOPHILS # BLD AUTO: 0.08 THOUSANDS/ΜL (ref 0–0.1)
BASOPHILS NFR BLD AUTO: 1 % (ref 0–1)
BILIRUB SERPL-MCNC: 0.52 MG/DL (ref 0.2–1)
BILIRUB UR QL STRIP: NEGATIVE
BUN SERPL-MCNC: 25 MG/DL (ref 5–25)
CALCIUM ALBUM COR SERPL-MCNC: 9.7 MG/DL (ref 8.3–10.1)
CALCIUM SERPL-MCNC: 9 MG/DL (ref 8.3–10.1)
CHLORIDE SERPL-SCNC: 103 MMOL/L (ref 100–108)
CK SERPL-CCNC: 10 U/L (ref 26–192)
CLARITY UR: CLEAR
CLARITY, POC: CLEAR
CO2 SERPL-SCNC: 27 MMOL/L (ref 21–32)
COLOR UR: YELLOW
COLOR, POC: YELLOW
CREAT SERPL-MCNC: 1.2 MG/DL (ref 0.6–1.3)
EOSINOPHIL # BLD AUTO: 0.07 THOUSAND/ΜL (ref 0–0.61)
EOSINOPHIL NFR BLD AUTO: 1 % (ref 0–6)
ERYTHROCYTE [DISTWIDTH] IN BLOOD BY AUTOMATED COUNT: 15.8 % (ref 11.6–15.1)
GFR SERPL CREATININE-BSD FRML MDRD: 38 ML/MIN/1.73SQ M
GLUCOSE SERPL-MCNC: 218 MG/DL (ref 65–140)
GLUCOSE UR STRIP-MCNC: ABNORMAL MG/DL
HCT VFR BLD AUTO: 44.7 % (ref 34.8–46.1)
HGB BLD-MCNC: 13.7 G/DL (ref 11.5–15.4)
HGB UR QL STRIP.AUTO: ABNORMAL
IMM GRANULOCYTES # BLD AUTO: 0.07 THOUSAND/UL (ref 0–0.2)
IMM GRANULOCYTES NFR BLD AUTO: 1 % (ref 0–2)
INR PPP: 1.07 (ref 0.84–1.19)
KETONES UR STRIP-MCNC: NEGATIVE MG/DL
LACTATE SERPL-SCNC: 1.1 MMOL/L (ref 0.5–2)
LEUKOCYTE ESTERASE UR QL STRIP: ABNORMAL
LIPASE SERPL-CCNC: 73 U/L (ref 73–393)
LYMPHOCYTES # BLD AUTO: 0.89 THOUSANDS/ΜL (ref 0.6–4.47)
LYMPHOCYTES NFR BLD AUTO: 6 % (ref 14–44)
MAGNESIUM SERPL-MCNC: 1.8 MG/DL (ref 1.6–2.6)
MCH RBC QN AUTO: 26.6 PG (ref 26.8–34.3)
MCHC RBC AUTO-ENTMCNC: 30.6 G/DL (ref 31.4–37.4)
MCV RBC AUTO: 87 FL (ref 82–98)
MONOCYTES # BLD AUTO: 1.13 THOUSAND/ΜL (ref 0.17–1.22)
MONOCYTES NFR BLD AUTO: 8 % (ref 4–12)
NEUTROPHILS # BLD AUTO: 11.77 THOUSANDS/ΜL (ref 1.85–7.62)
NEUTS SEG NFR BLD AUTO: 83 % (ref 43–75)
NITRITE UR QL STRIP: NEGATIVE
NRBC BLD AUTO-RTO: 0 /100 WBCS
PH UR STRIP.AUTO: 6 [PH] (ref 4.5–8)
PLATELET # BLD AUTO: 612 THOUSANDS/UL (ref 149–390)
PMV BLD AUTO: 9.9 FL (ref 8.9–12.7)
POTASSIUM SERPL-SCNC: 3.8 MMOL/L (ref 3.5–5.3)
PROT SERPL-MCNC: 7 G/DL (ref 6.4–8.2)
PROT UR STRIP-MCNC: ABNORMAL MG/DL
PROTHROMBIN TIME: 13.7 SECONDS (ref 11.6–14.5)
RBC # BLD AUTO: 5.15 MILLION/UL (ref 3.81–5.12)
SODIUM SERPL-SCNC: 137 MMOL/L (ref 136–145)
SP GR UR STRIP.AUTO: 1.02 (ref 1–1.03)
TROPONIN I SERPL-MCNC: <0.02 NG/ML
UROBILINOGEN UR QL STRIP.AUTO: 0.2 E.U./DL
WBC # BLD AUTO: 14.01 THOUSAND/UL (ref 4.31–10.16)

## 2020-12-10 PROCEDURE — 96375 TX/PRO/DX INJ NEW DRUG ADDON: CPT

## 2020-12-10 PROCEDURE — 85025 COMPLETE CBC W/AUTO DIFF WBC: CPT | Performed by: NURSE PRACTITIONER

## 2020-12-10 PROCEDURE — 81001 URINALYSIS AUTO W/SCOPE: CPT

## 2020-12-10 PROCEDURE — 83605 ASSAY OF LACTIC ACID: CPT | Performed by: NURSE PRACTITIONER

## 2020-12-10 PROCEDURE — 1124F ACP DISCUSS-NO DSCNMKR DOCD: CPT | Performed by: NURSE PRACTITIONER

## 2020-12-10 PROCEDURE — 87186 SC STD MICRODIL/AGAR DIL: CPT | Performed by: NURSE PRACTITIONER

## 2020-12-10 PROCEDURE — 93005 ELECTROCARDIOGRAM TRACING: CPT

## 2020-12-10 PROCEDURE — 82550 ASSAY OF CK (CPK): CPT | Performed by: NURSE PRACTITIONER

## 2020-12-10 PROCEDURE — 84484 ASSAY OF TROPONIN QUANT: CPT | Performed by: NURSE PRACTITIONER

## 2020-12-10 PROCEDURE — 99285 EMERGENCY DEPT VISIT HI MDM: CPT | Performed by: NURSE PRACTITIONER

## 2020-12-10 PROCEDURE — 85730 THROMBOPLASTIN TIME PARTIAL: CPT | Performed by: NURSE PRACTITIONER

## 2020-12-10 PROCEDURE — 87077 CULTURE AEROBIC IDENTIFY: CPT | Performed by: NURSE PRACTITIONER

## 2020-12-10 PROCEDURE — 83690 ASSAY OF LIPASE: CPT | Performed by: NURSE PRACTITIONER

## 2020-12-10 PROCEDURE — G1004 CDSM NDSC: HCPCS

## 2020-12-10 PROCEDURE — 96374 THER/PROPH/DIAG INJ IV PUSH: CPT

## 2020-12-10 PROCEDURE — 83735 ASSAY OF MAGNESIUM: CPT | Performed by: NURSE PRACTITIONER

## 2020-12-10 PROCEDURE — 74177 CT ABD & PELVIS W/CONTRAST: CPT

## 2020-12-10 PROCEDURE — 96361 HYDRATE IV INFUSION ADD-ON: CPT

## 2020-12-10 PROCEDURE — 96376 TX/PRO/DX INJ SAME DRUG ADON: CPT

## 2020-12-10 PROCEDURE — 87086 URINE CULTURE/COLONY COUNT: CPT | Performed by: NURSE PRACTITIONER

## 2020-12-10 PROCEDURE — 99285 EMERGENCY DEPT VISIT HI MDM: CPT

## 2020-12-10 PROCEDURE — 36415 COLL VENOUS BLD VENIPUNCTURE: CPT | Performed by: NURSE PRACTITIONER

## 2020-12-10 PROCEDURE — 85610 PROTHROMBIN TIME: CPT | Performed by: NURSE PRACTITIONER

## 2020-12-10 PROCEDURE — 80053 COMPREHEN METABOLIC PANEL: CPT | Performed by: NURSE PRACTITIONER

## 2020-12-10 RX ORDER — ONDANSETRON 2 MG/ML
4 INJECTION INTRAMUSCULAR; INTRAVENOUS ONCE
Status: COMPLETED | OUTPATIENT
Start: 2020-12-10 | End: 2020-12-10

## 2020-12-10 RX ORDER — SODIUM CHLORIDE 9 MG/ML
60 INJECTION, SOLUTION INTRAVENOUS CONTINUOUS
Status: DISCONTINUED | OUTPATIENT
Start: 2020-12-10 | End: 2020-12-11

## 2020-12-10 RX ADMIN — ONDANSETRON 4 MG: 2 INJECTION INTRAMUSCULAR; INTRAVENOUS at 22:59

## 2020-12-10 RX ADMIN — SODIUM CHLORIDE 125 ML/HR: 0.9 INJECTION, SOLUTION INTRAVENOUS at 21:49

## 2020-12-10 RX ADMIN — MORPHINE SULFATE 2 MG: 2 INJECTION, SOLUTION INTRAMUSCULAR; INTRAVENOUS at 21:49

## 2020-12-10 RX ADMIN — MORPHINE SULFATE 2 MG: 2 INJECTION, SOLUTION INTRAMUSCULAR; INTRAVENOUS at 23:01

## 2020-12-10 RX ADMIN — ONDANSETRON 4 MG: 2 INJECTION INTRAMUSCULAR; INTRAVENOUS at 21:49

## 2020-12-11 ENCOUNTER — APPOINTMENT (INPATIENT)
Dept: ULTRASOUND IMAGING | Facility: HOSPITAL | Age: 85
DRG: 445 | End: 2020-12-11
Payer: MEDICARE

## 2020-12-11 PROBLEM — R53.83 FATIGUE: Status: RESOLVED | Noted: 2019-10-12 | Resolved: 2020-12-11

## 2020-12-11 PROBLEM — N30.00 ACUTE CYSTITIS WITHOUT HEMATURIA: Status: ACTIVE | Noted: 2020-12-11
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
## 2020-12-11 PROBLEM — R10.84 GENERALIZED ABDOMINAL PAIN: Status: ACTIVE | Noted: 2020-12-11

## 2020-12-11 LAB
ALBUMIN SERPL BCP-MCNC: 2.9 G/DL (ref 3.5–5)
ALP SERPL-CCNC: 100 U/L (ref 46–116)
ALT SERPL W P-5'-P-CCNC: 7 U/L (ref 12–78)
ANION GAP SERPL CALCULATED.3IONS-SCNC: 8 MMOL/L (ref 4–13)
AST SERPL W P-5'-P-CCNC: 17 U/L (ref 5–45)
ATRIAL RATE: 62 BPM
BACTERIA UR QL AUTO: ABNORMAL /HPF
BASOPHILS # BLD AUTO: 0.06 THOUSANDS/ΜL (ref 0–0.1)
BASOPHILS NFR BLD AUTO: 0 % (ref 0–1)
BILIRUB SERPL-MCNC: 0.43 MG/DL (ref 0.2–1)
BUN SERPL-MCNC: 25 MG/DL (ref 5–25)
CALCIUM ALBUM COR SERPL-MCNC: 9.9 MG/DL (ref 8.3–10.1)
CALCIUM SERPL-MCNC: 9 MG/DL (ref 8.3–10.1)
CHLORIDE SERPL-SCNC: 102 MMOL/L (ref 100–108)
CO2 SERPL-SCNC: 24 MMOL/L (ref 21–32)
CREAT SERPL-MCNC: 1.25 MG/DL (ref 0.6–1.3)
EOSINOPHIL # BLD AUTO: 0.02 THOUSAND/ΜL (ref 0–0.61)
EOSINOPHIL NFR BLD AUTO: 0 % (ref 0–6)
ERYTHROCYTE [DISTWIDTH] IN BLOOD BY AUTOMATED COUNT: 15.6 % (ref 11.6–15.1)
EST. AVERAGE GLUCOSE BLD GHB EST-MCNC: 148 MG/DL
GFR SERPL CREATININE-BSD FRML MDRD: 36 ML/MIN/1.73SQ M
GLUCOSE SERPL-MCNC: 152 MG/DL (ref 65–140)
GLUCOSE SERPL-MCNC: 174 MG/DL (ref 65–140)
GLUCOSE SERPL-MCNC: 180 MG/DL (ref 65–140)
GLUCOSE SERPL-MCNC: 183 MG/DL (ref 65–140)
GLUCOSE SERPL-MCNC: 212 MG/DL (ref 65–140)
HBA1C MFR BLD: 6.8 %
HCT VFR BLD AUTO: 41 % (ref 34.8–46.1)
HGB BLD-MCNC: 12.6 G/DL (ref 11.5–15.4)
IMM GRANULOCYTES # BLD AUTO: 0.14 THOUSAND/UL (ref 0–0.2)
IMM GRANULOCYTES NFR BLD AUTO: 1 % (ref 0–2)
LYMPHOCYTES # BLD AUTO: 1.02 THOUSANDS/ΜL (ref 0.6–4.47)
LYMPHOCYTES NFR BLD AUTO: 6 % (ref 14–44)
MCH RBC QN AUTO: 26.8 PG (ref 26.8–34.3)
MCHC RBC AUTO-ENTMCNC: 30.7 G/DL (ref 31.4–37.4)
MCV RBC AUTO: 87 FL (ref 82–98)
MONOCYTES # BLD AUTO: 1.71 THOUSAND/ΜL (ref 0.17–1.22)
MONOCYTES NFR BLD AUTO: 11 % (ref 4–12)
NEUTROPHILS # BLD AUTO: 13.29 THOUSANDS/ΜL (ref 1.85–7.62)
NEUTS SEG NFR BLD AUTO: 82 % (ref 43–75)
NON-SQ EPI CELLS URNS QL MICRO: ABNORMAL /HPF
NRBC BLD AUTO-RTO: 0 /100 WBCS
P AXIS: 72 DEGREES
PLATELET # BLD AUTO: 488 THOUSANDS/UL (ref 149–390)
PMV BLD AUTO: 9.9 FL (ref 8.9–12.7)
POTASSIUM SERPL-SCNC: 4.2 MMOL/L (ref 3.5–5.3)
PR INTERVAL: 180 MS
PROT SERPL-MCNC: 6.8 G/DL (ref 6.4–8.2)
QRS AXIS: -13 DEGREES
QRSD INTERVAL: 104 MS
QT INTERVAL: 412 MS
QTC INTERVAL: 418 MS
RBC # BLD AUTO: 4.7 MILLION/UL (ref 3.81–5.12)
RBC #/AREA URNS AUTO: ABNORMAL /HPF
SODIUM SERPL-SCNC: 134 MMOL/L (ref 136–145)
T WAVE AXIS: 31 DEGREES
VENTRICULAR RATE: 62 BPM
WBC # BLD AUTO: 16.24 THOUSAND/UL (ref 4.31–10.16)
WBC #/AREA URNS AUTO: ABNORMAL /HPF

## 2020-12-11 PROCEDURE — 96375 TX/PRO/DX INJ NEW DRUG ADDON: CPT

## 2020-12-11 PROCEDURE — 99222 1ST HOSP IP/OBS MODERATE 55: CPT | Performed by: INTERNAL MEDICINE

## 2020-12-11 PROCEDURE — 99222 1ST HOSP IP/OBS MODERATE 55: CPT | Performed by: SURGERY

## 2020-12-11 PROCEDURE — 97163 PT EVAL HIGH COMPLEX 45 MIN: CPT

## 2020-12-11 PROCEDURE — 76705 ECHO EXAM OF ABDOMEN: CPT

## 2020-12-11 PROCEDURE — 82948 REAGENT STRIP/BLOOD GLUCOSE: CPT

## 2020-12-11 PROCEDURE — 99223 1ST HOSP IP/OBS HIGH 75: CPT | Performed by: HOSPITALIST

## 2020-12-11 PROCEDURE — 93010 ELECTROCARDIOGRAM REPORT: CPT | Performed by: INTERNAL MEDICINE

## 2020-12-11 PROCEDURE — 83036 HEMOGLOBIN GLYCOSYLATED A1C: CPT | Performed by: HOSPITALIST

## 2020-12-11 PROCEDURE — 99232 SBSQ HOSP IP/OBS MODERATE 35: CPT | Performed by: INTERNAL MEDICINE

## 2020-12-11 PROCEDURE — 80053 COMPREHEN METABOLIC PANEL: CPT | Performed by: HOSPITALIST

## 2020-12-11 PROCEDURE — 94762 N-INVAS EAR/PLS OXIMTRY CONT: CPT

## 2020-12-11 PROCEDURE — 97167 OT EVAL HIGH COMPLEX 60 MIN: CPT

## 2020-12-11 PROCEDURE — 85025 COMPLETE CBC W/AUTO DIFF WBC: CPT | Performed by: HOSPITALIST

## 2020-12-11 RX ORDER — HYDRALAZINE HYDROCHLORIDE 20 MG/ML
10 INJECTION INTRAMUSCULAR; INTRAVENOUS EVERY 4 HOURS PRN
Status: DISCONTINUED | OUTPATIENT
Start: 2020-12-11 | End: 2020-12-15 | Stop reason: HOSPADM

## 2020-12-11 RX ORDER — LISINOPRIL 10 MG/1
10 TABLET ORAL DAILY
Status: DISCONTINUED | OUTPATIENT
Start: 2020-12-11 | End: 2020-12-11

## 2020-12-11 RX ORDER — CARVEDILOL 12.5 MG/1
12.5 TABLET ORAL 2 TIMES DAILY WITH MEALS
Status: DISCONTINUED | OUTPATIENT
Start: 2020-12-11 | End: 2020-12-15 | Stop reason: HOSPADM

## 2020-12-11 RX ORDER — PREDNISOLONE ACETATE 10 MG/ML
1 SUSPENSION/ DROPS OPHTHALMIC DAILY
Status: DISCONTINUED | OUTPATIENT
Start: 2020-12-11 | End: 2020-12-15 | Stop reason: HOSPADM

## 2020-12-11 RX ORDER — POLYETHYLENE GLYCOL 3350 17 G/17G
17 POWDER, FOR SOLUTION ORAL DAILY
Status: DISCONTINUED | OUTPATIENT
Start: 2020-12-11 | End: 2020-12-12

## 2020-12-11 RX ORDER — PRAVASTATIN SODIUM 10 MG
10 TABLET ORAL DAILY
Status: DISCONTINUED | OUTPATIENT
Start: 2020-12-11 | End: 2020-12-15 | Stop reason: HOSPADM

## 2020-12-11 RX ORDER — ONDANSETRON 2 MG/ML
4 INJECTION INTRAMUSCULAR; INTRAVENOUS EVERY 6 HOURS PRN
Status: DISCONTINUED | OUTPATIENT
Start: 2020-12-11 | End: 2020-12-15 | Stop reason: HOSPADM

## 2020-12-11 RX ORDER — LABETALOL 20 MG/4 ML (5 MG/ML) INTRAVENOUS SYRINGE
10 ONCE
Status: COMPLETED | OUTPATIENT
Start: 2020-12-11 | End: 2020-12-11

## 2020-12-11 RX ORDER — LISINOPRIL 10 MG/1
10 TABLET ORAL 2 TIMES DAILY
Status: DISCONTINUED | OUTPATIENT
Start: 2020-12-11 | End: 2020-12-12

## 2020-12-11 RX ORDER — ACETAMINOPHEN 325 MG/1
650 TABLET ORAL EVERY 6 HOURS PRN
Status: DISCONTINUED | OUTPATIENT
Start: 2020-12-11 | End: 2020-12-15 | Stop reason: HOSPADM

## 2020-12-11 RX ORDER — AMOXICILLIN 250 MG
1 CAPSULE ORAL
Status: DISCONTINUED | OUTPATIENT
Start: 2020-12-11 | End: 2020-12-15 | Stop reason: HOSPADM

## 2020-12-11 RX ORDER — SORBITOL SOLUTION 70 %
30 SOLUTION, ORAL MISCELLANEOUS 2 TIMES DAILY
Status: DISCONTINUED | OUTPATIENT
Start: 2020-12-11 | End: 2020-12-12

## 2020-12-11 RX ORDER — GLIMEPIRIDE 2 MG/1
1 TABLET ORAL
Status: DISCONTINUED | OUTPATIENT
Start: 2020-12-11 | End: 2020-12-15 | Stop reason: HOSPADM

## 2020-12-11 RX ORDER — FAMOTIDINE 20 MG/1
20 TABLET, FILM COATED ORAL 2 TIMES DAILY
Status: DISCONTINUED | OUTPATIENT
Start: 2020-12-11 | End: 2020-12-15 | Stop reason: HOSPADM

## 2020-12-11 RX ORDER — AMLODIPINE BESYLATE 10 MG/1
10 TABLET ORAL DAILY
Status: DISCONTINUED | OUTPATIENT
Start: 2020-12-11 | End: 2020-12-15 | Stop reason: HOSPADM

## 2020-12-11 RX ADMIN — ONDANSETRON 4 MG: 2 INJECTION INTRAMUSCULAR; INTRAVENOUS at 14:25

## 2020-12-11 RX ADMIN — MORPHINE SULFATE 2 MG: 2 INJECTION, SOLUTION INTRAMUSCULAR; INTRAVENOUS at 14:24

## 2020-12-11 RX ADMIN — SORBITOL SOLUTION (BULK) 30 ML: 70 SOLUTION at 21:15

## 2020-12-11 RX ADMIN — ONDANSETRON 4 MG: 2 INJECTION INTRAMUSCULAR; INTRAVENOUS at 03:50

## 2020-12-11 RX ADMIN — IODIXANOL 85 ML: 320 INJECTION, SOLUTION INTRAVASCULAR at 00:10

## 2020-12-11 RX ADMIN — BISACODYL 10 MG: 5 TABLET, COATED ORAL at 00:54

## 2020-12-11 RX ADMIN — AMLODIPINE BESYLATE 10 MG: 10 TABLET ORAL at 08:11

## 2020-12-11 RX ADMIN — SODIUM CHLORIDE 125 ML/HR: 0.9 INJECTION, SOLUTION INTRAVENOUS at 06:39

## 2020-12-11 RX ADMIN — INSULIN LISPRO 2 UNITS: 100 INJECTION, SOLUTION INTRAVENOUS; SUBCUTANEOUS at 17:16

## 2020-12-11 RX ADMIN — LABETALOL 20 MG/4 ML (5 MG/ML) INTRAVENOUS SYRINGE 10 MG: at 00:32

## 2020-12-11 RX ADMIN — LABETALOL 20 MG/4 ML (5 MG/ML) INTRAVENOUS SYRINGE 10 MG: at 01:23

## 2020-12-11 RX ADMIN — TIMOLOL MALEATE 1 DROP: 2.5 SOLUTION/ DROPS OPHTHALMIC at 21:15

## 2020-12-11 RX ADMIN — PRAVASTATIN SODIUM 10 MG: 10 TABLET ORAL at 08:12

## 2020-12-11 RX ADMIN — DOCUSATE SODIUM AND SENNOSIDES 1 TABLET: 8.6; 5 TABLET, FILM COATED ORAL at 21:15

## 2020-12-11 RX ADMIN — CEFTRIAXONE SODIUM 1000 MG: 10 INJECTION, POWDER, FOR SOLUTION INTRAVENOUS at 23:22

## 2020-12-11 RX ADMIN — DEXTROSE 1000 MG: 50 INJECTION, SOLUTION INTRAVENOUS at 00:55

## 2020-12-11 RX ADMIN — LISINOPRIL 10 MG: 10 TABLET ORAL at 08:12

## 2020-12-11 RX ADMIN — CARVEDILOL 12.5 MG: 12.5 TABLET, FILM COATED ORAL at 17:16

## 2020-12-11 RX ADMIN — PREDNISOLONE ACETATE 1 DROP: 10 SUSPENSION/ DROPS OPHTHALMIC at 08:15

## 2020-12-11 RX ADMIN — LISINOPRIL 10 MG: 10 TABLET ORAL at 21:15

## 2020-12-11 RX ADMIN — POLYETHYLENE GLYCOL 3350 17 G: 17 POWDER, FOR SOLUTION ORAL at 10:47

## 2020-12-11 RX ADMIN — CARVEDILOL 12.5 MG: 12.5 TABLET, FILM COATED ORAL at 08:12

## 2020-12-11 RX ADMIN — HYDRALAZINE HYDROCHLORIDE 10 MG: 20 INJECTION INTRAMUSCULAR; INTRAVENOUS at 03:09

## 2020-12-11 RX ADMIN — MORPHINE SULFATE 2 MG: 2 INJECTION, SOLUTION INTRAMUSCULAR; INTRAVENOUS at 03:31

## 2020-12-11 RX ADMIN — FAMOTIDINE 20 MG: 20 TABLET ORAL at 08:11

## 2020-12-11 RX ADMIN — ENOXAPARIN SODIUM 40 MG: 40 INJECTION SUBCUTANEOUS at 08:12

## 2020-12-11 RX ADMIN — MORPHINE SULFATE 2 MG: 2 INJECTION, SOLUTION INTRAMUSCULAR; INTRAVENOUS at 10:33

## 2020-12-11 RX ADMIN — INSULIN LISPRO 1 UNITS: 100 INJECTION, SOLUTION INTRAVENOUS; SUBCUTANEOUS at 12:00

## 2020-12-11 RX ADMIN — SORBITOL SOLUTION (BULK) 30 ML: 70 SOLUTION at 10:36

## 2020-12-11 RX ADMIN — FAMOTIDINE 20 MG: 20 TABLET ORAL at 17:16

## 2020-12-12 ENCOUNTER — APPOINTMENT (INPATIENT)
Dept: RADIOLOGY | Facility: HOSPITAL | Age: 85
DRG: 445 | End: 2020-12-12
Payer: MEDICARE

## 2020-12-12 LAB
ALBUMIN SERPL BCP-MCNC: 2.5 G/DL (ref 3.5–5)
ALP SERPL-CCNC: 89 U/L (ref 46–116)
ANION GAP SERPL CALCULATED.3IONS-SCNC: 10 MMOL/L (ref 4–13)
AST SERPL W P-5'-P-CCNC: 17 U/L (ref 5–45)
BASOPHILS # BLD AUTO: 0.08 THOUSANDS/ΜL (ref 0–0.1)
BASOPHILS NFR BLD AUTO: 0 % (ref 0–1)
BILIRUB SERPL-MCNC: 0.52 MG/DL (ref 0.2–1)
BUN SERPL-MCNC: 32 MG/DL (ref 5–25)
CALCIUM ALBUM COR SERPL-MCNC: 10.1 MG/DL (ref 8.3–10.1)
CALCIUM SERPL-MCNC: 8.9 MG/DL (ref 8.3–10.1)
CHLORIDE SERPL-SCNC: 103 MMOL/L (ref 100–108)
CO2 SERPL-SCNC: 22 MMOL/L (ref 21–32)
CREAT SERPL-MCNC: 1.62 MG/DL (ref 0.6–1.3)
EOSINOPHIL # BLD AUTO: 0 THOUSAND/ΜL (ref 0–0.61)
EOSINOPHIL NFR BLD AUTO: 0 % (ref 0–6)
ERYTHROCYTE [DISTWIDTH] IN BLOOD BY AUTOMATED COUNT: 16.1 % (ref 11.6–15.1)
GFR SERPL CREATININE-BSD FRML MDRD: 26 ML/MIN/1.73SQ M
GLUCOSE P FAST SERPL-MCNC: 219 MG/DL (ref 65–99)
GLUCOSE SERPL-MCNC: 173 MG/DL (ref 65–140)
GLUCOSE SERPL-MCNC: 176 MG/DL (ref 65–140)
GLUCOSE SERPL-MCNC: 209 MG/DL (ref 65–140)
GLUCOSE SERPL-MCNC: 219 MG/DL (ref 65–140)
GLUCOSE SERPL-MCNC: 286 MG/DL (ref 65–140)
HCT VFR BLD AUTO: 39.5 % (ref 34.8–46.1)
HGB BLD-MCNC: 12.1 G/DL (ref 11.5–15.4)
IMM GRANULOCYTES # BLD AUTO: 0.3 THOUSAND/UL (ref 0–0.2)
IMM GRANULOCYTES NFR BLD AUTO: 1 % (ref 0–2)
LYMPHOCYTES # BLD AUTO: 1.13 THOUSANDS/ΜL (ref 0.6–4.47)
LYMPHOCYTES NFR BLD AUTO: 4 % (ref 14–44)
MCH RBC QN AUTO: 27.3 PG (ref 26.8–34.3)
MCHC RBC AUTO-ENTMCNC: 30.6 G/DL (ref 31.4–37.4)
MCV RBC AUTO: 89 FL (ref 82–98)
MONOCYTES # BLD AUTO: 2.65 THOUSAND/ΜL (ref 0.17–1.22)
MONOCYTES NFR BLD AUTO: 10 % (ref 4–12)
NEUTROPHILS # BLD AUTO: 21.82 THOUSANDS/ΜL (ref 1.85–7.62)
NEUTS SEG NFR BLD AUTO: 85 % (ref 43–75)
NRBC BLD AUTO-RTO: 0 /100 WBCS
PLATELET # BLD AUTO: 445 THOUSANDS/UL (ref 149–390)
PMV BLD AUTO: 10.5 FL (ref 8.9–12.7)
POTASSIUM SERPL-SCNC: 4.3 MMOL/L (ref 3.5–5.3)
PROT SERPL-MCNC: 6.6 G/DL (ref 6.4–8.2)
RBC # BLD AUTO: 4.44 MILLION/UL (ref 3.81–5.12)
SODIUM SERPL-SCNC: 135 MMOL/L (ref 136–145)
WBC # BLD AUTO: 25.98 THOUSAND/UL (ref 4.31–10.16)

## 2020-12-12 PROCEDURE — 85025 COMPLETE CBC W/AUTO DIFF WBC: CPT | Performed by: INTERNAL MEDICINE

## 2020-12-12 PROCEDURE — 99232 SBSQ HOSP IP/OBS MODERATE 35: CPT | Performed by: SURGERY

## 2020-12-12 PROCEDURE — 99232 SBSQ HOSP IP/OBS MODERATE 35: CPT | Performed by: INTERNAL MEDICINE

## 2020-12-12 PROCEDURE — 82948 REAGENT STRIP/BLOOD GLUCOSE: CPT

## 2020-12-12 PROCEDURE — 80053 COMPREHEN METABOLIC PANEL: CPT | Performed by: INTERNAL MEDICINE

## 2020-12-12 PROCEDURE — 94762 N-INVAS EAR/PLS OXIMTRY CONT: CPT

## 2020-12-12 PROCEDURE — 99232 SBSQ HOSP IP/OBS MODERATE 35: CPT | Performed by: NURSE PRACTITIONER

## 2020-12-12 PROCEDURE — 71046 X-RAY EXAM CHEST 2 VIEWS: CPT

## 2020-12-12 RX ORDER — CLONIDINE 0.2 MG/24H
0.2 PATCH, EXTENDED RELEASE TRANSDERMAL WEEKLY
Status: DISCONTINUED | OUTPATIENT
Start: 2020-12-12 | End: 2020-12-15 | Stop reason: HOSPADM

## 2020-12-12 RX ORDER — LISINOPRIL 10 MG/1
10 TABLET ORAL DAILY
Status: DISCONTINUED | OUTPATIENT
Start: 2020-12-12 | End: 2020-12-15 | Stop reason: HOSPADM

## 2020-12-12 RX ORDER — INSULIN GLARGINE 100 [IU]/ML
8 INJECTION, SOLUTION SUBCUTANEOUS
Status: DISCONTINUED | OUTPATIENT
Start: 2020-12-12 | End: 2020-12-13

## 2020-12-12 RX ADMIN — INSULIN LISPRO 1 UNITS: 100 INJECTION, SOLUTION INTRAVENOUS; SUBCUTANEOUS at 17:26

## 2020-12-12 RX ADMIN — INSULIN GLARGINE 8 UNITS: 100 INJECTION, SOLUTION SUBCUTANEOUS at 21:16

## 2020-12-12 RX ADMIN — POLYETHYLENE GLYCOL 3350 17 G: 17 POWDER, FOR SOLUTION ORAL at 10:38

## 2020-12-12 RX ADMIN — PREDNISOLONE ACETATE 1 DROP: 10 SUSPENSION/ DROPS OPHTHALMIC at 10:35

## 2020-12-12 RX ADMIN — SORBITOL SOLUTION (BULK) 30 ML: 70 SOLUTION at 10:43

## 2020-12-12 RX ADMIN — FAMOTIDINE 20 MG: 20 TABLET ORAL at 17:26

## 2020-12-12 RX ADMIN — TIMOLOL MALEATE 1 DROP: 2.5 SOLUTION/ DROPS OPHTHALMIC at 21:16

## 2020-12-12 RX ADMIN — ENOXAPARIN SODIUM 40 MG: 40 INJECTION SUBCUTANEOUS at 10:37

## 2020-12-12 RX ADMIN — AMLODIPINE BESYLATE 10 MG: 10 TABLET ORAL at 10:39

## 2020-12-12 RX ADMIN — CEFTRIAXONE SODIUM 1000 MG: 10 INJECTION, POWDER, FOR SOLUTION INTRAVENOUS at 23:22

## 2020-12-12 RX ADMIN — INSULIN LISPRO 3 UNITS: 100 INJECTION, SOLUTION INTRAVENOUS; SUBCUTANEOUS at 10:36

## 2020-12-12 RX ADMIN — PRAVASTATIN SODIUM 10 MG: 10 TABLET ORAL at 10:39

## 2020-12-12 RX ADMIN — CARVEDILOL 12.5 MG: 12.5 TABLET, FILM COATED ORAL at 17:26

## 2020-12-12 RX ADMIN — CLONIDINE 0.2 MG: 0.2 PATCH TRANSDERMAL at 10:40

## 2020-12-12 RX ADMIN — CARVEDILOL 12.5 MG: 12.5 TABLET, FILM COATED ORAL at 10:39

## 2020-12-12 RX ADMIN — LISINOPRIL 10 MG: 10 TABLET ORAL at 10:39

## 2020-12-12 RX ADMIN — ONDANSETRON 4 MG: 2 INJECTION INTRAMUSCULAR; INTRAVENOUS at 12:18

## 2020-12-12 RX ADMIN — FAMOTIDINE 20 MG: 20 TABLET ORAL at 10:39

## 2020-12-13 LAB
ANION GAP SERPL CALCULATED.3IONS-SCNC: 12 MMOL/L (ref 4–13)
BACTERIA UR CULT: ABNORMAL
BASOPHILS # BLD AUTO: 0.08 THOUSANDS/ΜL (ref 0–0.1)
BASOPHILS NFR BLD AUTO: 0 % (ref 0–1)
BUN SERPL-MCNC: 42 MG/DL (ref 5–25)
CALCIUM SERPL-MCNC: 9.1 MG/DL (ref 8.3–10.1)
CHLORIDE SERPL-SCNC: 102 MMOL/L (ref 100–108)
CO2 SERPL-SCNC: 21 MMOL/L (ref 21–32)
CREAT SERPL-MCNC: 1.86 MG/DL (ref 0.6–1.3)
EOSINOPHIL # BLD AUTO: 0.01 THOUSAND/ΜL (ref 0–0.61)
EOSINOPHIL NFR BLD AUTO: 0 % (ref 0–6)
ERYTHROCYTE [DISTWIDTH] IN BLOOD BY AUTOMATED COUNT: 16.2 % (ref 11.6–15.1)
GFR SERPL CREATININE-BSD FRML MDRD: 22 ML/MIN/1.73SQ M
GLUCOSE SERPL-MCNC: 100 MG/DL (ref 65–140)
GLUCOSE SERPL-MCNC: 110 MG/DL (ref 65–140)
GLUCOSE SERPL-MCNC: 128 MG/DL (ref 65–140)
GLUCOSE SERPL-MCNC: 215 MG/DL (ref 65–140)
GLUCOSE SERPL-MCNC: 92 MG/DL (ref 65–140)
HCT VFR BLD AUTO: 37.7 % (ref 34.8–46.1)
HGB BLD-MCNC: 11.6 G/DL (ref 11.5–15.4)
IMM GRANULOCYTES # BLD AUTO: 0.18 THOUSAND/UL (ref 0–0.2)
IMM GRANULOCYTES NFR BLD AUTO: 1 % (ref 0–2)
LYMPHOCYTES # BLD AUTO: 1.12 THOUSANDS/ΜL (ref 0.6–4.47)
LYMPHOCYTES NFR BLD AUTO: 5 % (ref 14–44)
MCH RBC QN AUTO: 26.9 PG (ref 26.8–34.3)
MCHC RBC AUTO-ENTMCNC: 30.8 G/DL (ref 31.4–37.4)
MCV RBC AUTO: 87 FL (ref 82–98)
MONOCYTES # BLD AUTO: 1.91 THOUSAND/ΜL (ref 0.17–1.22)
MONOCYTES NFR BLD AUTO: 8 % (ref 4–12)
NEUTROPHILS # BLD AUTO: 21.53 THOUSANDS/ΜL (ref 1.85–7.62)
NEUTS SEG NFR BLD AUTO: 86 % (ref 43–75)
NRBC BLD AUTO-RTO: 0 /100 WBCS
PLATELET # BLD AUTO: 417 THOUSANDS/UL (ref 149–390)
PMV BLD AUTO: 9.9 FL (ref 8.9–12.7)
POTASSIUM SERPL-SCNC: 4.2 MMOL/L (ref 3.5–5.3)
RBC # BLD AUTO: 4.32 MILLION/UL (ref 3.81–5.12)
SODIUM SERPL-SCNC: 135 MMOL/L (ref 136–145)
WBC # BLD AUTO: 24.83 THOUSAND/UL (ref 4.31–10.16)

## 2020-12-13 PROCEDURE — 99232 SBSQ HOSP IP/OBS MODERATE 35: CPT | Performed by: INTERNAL MEDICINE

## 2020-12-13 PROCEDURE — 80048 BASIC METABOLIC PNL TOTAL CA: CPT | Performed by: INTERNAL MEDICINE

## 2020-12-13 PROCEDURE — 82948 REAGENT STRIP/BLOOD GLUCOSE: CPT

## 2020-12-13 PROCEDURE — 94762 N-INVAS EAR/PLS OXIMTRY CONT: CPT

## 2020-12-13 PROCEDURE — 99232 SBSQ HOSP IP/OBS MODERATE 35: CPT | Performed by: SURGERY

## 2020-12-13 PROCEDURE — 85025 COMPLETE CBC W/AUTO DIFF WBC: CPT | Performed by: INTERNAL MEDICINE

## 2020-12-13 RX ORDER — SORBITOL SOLUTION 70 %
30 SOLUTION, ORAL MISCELLANEOUS 2 TIMES DAILY
Status: DISCONTINUED | OUTPATIENT
Start: 2020-12-13 | End: 2020-12-15 | Stop reason: HOSPADM

## 2020-12-13 RX ORDER — INSULIN GLARGINE 100 [IU]/ML
10 INJECTION, SOLUTION SUBCUTANEOUS
Status: DISCONTINUED | OUTPATIENT
Start: 2020-12-13 | End: 2020-12-14

## 2020-12-13 RX ORDER — REPAGLINIDE 1 MG/1
1 TABLET ORAL
Status: DISCONTINUED | OUTPATIENT
Start: 2020-12-13 | End: 2020-12-14

## 2020-12-13 RX ADMIN — CARVEDILOL 12.5 MG: 12.5 TABLET, FILM COATED ORAL at 17:30

## 2020-12-13 RX ADMIN — FAMOTIDINE 20 MG: 20 TABLET ORAL at 09:06

## 2020-12-13 RX ADMIN — CARVEDILOL 12.5 MG: 12.5 TABLET, FILM COATED ORAL at 09:06

## 2020-12-13 RX ADMIN — DOCUSATE SODIUM AND SENNOSIDES 1 TABLET: 8.6; 5 TABLET, FILM COATED ORAL at 21:01

## 2020-12-13 RX ADMIN — SORBITOL SOLUTION (BULK) 30 ML: 70 SOLUTION at 15:28

## 2020-12-13 RX ADMIN — FAMOTIDINE 20 MG: 20 TABLET ORAL at 17:31

## 2020-12-13 RX ADMIN — INSULIN GLARGINE 10 UNITS: 100 INJECTION, SOLUTION SUBCUTANEOUS at 22:18

## 2020-12-13 RX ADMIN — TIMOLOL MALEATE 1 DROP: 2.5 SOLUTION/ DROPS OPHTHALMIC at 21:01

## 2020-12-13 RX ADMIN — PRAVASTATIN SODIUM 10 MG: 10 TABLET ORAL at 09:06

## 2020-12-13 RX ADMIN — AMLODIPINE BESYLATE 10 MG: 10 TABLET ORAL at 09:06

## 2020-12-13 RX ADMIN — PREDNISOLONE ACETATE 1 DROP: 10 SUSPENSION/ DROPS OPHTHALMIC at 09:07

## 2020-12-13 RX ADMIN — LISINOPRIL 10 MG: 10 TABLET ORAL at 09:06

## 2020-12-13 RX ADMIN — CEFTRIAXONE SODIUM 1000 MG: 10 INJECTION, POWDER, FOR SOLUTION INTRAVENOUS at 23:49

## 2020-12-13 RX ADMIN — INSULIN LISPRO 2 UNITS: 100 INJECTION, SOLUTION INTRAVENOUS; SUBCUTANEOUS at 17:30

## 2020-12-13 RX ADMIN — REPAGLINIDE 1 MG: 1 TABLET ORAL at 15:31

## 2020-12-13 RX ADMIN — SORBITOL SOLUTION (BULK) 30 ML: 70 SOLUTION at 21:01

## 2020-12-13 RX ADMIN — ENOXAPARIN SODIUM 40 MG: 40 INJECTION SUBCUTANEOUS at 09:06

## 2020-12-14 ENCOUNTER — APPOINTMENT (INPATIENT)
Dept: NUCLEAR MEDICINE | Facility: HOSPITAL | Age: 85
DRG: 445 | End: 2020-12-14
Payer: MEDICARE

## 2020-12-14 LAB
ALBUMIN SERPL BCP-MCNC: 2.5 G/DL (ref 3.5–5)
ALP SERPL-CCNC: 93 U/L (ref 46–116)
ALT SERPL W P-5'-P-CCNC: 15 U/L (ref 12–78)
ANION GAP SERPL CALCULATED.3IONS-SCNC: 8 MMOL/L (ref 4–13)
AST SERPL W P-5'-P-CCNC: 16 U/L (ref 5–45)
BASOPHILS # BLD AUTO: 0.07 THOUSANDS/ΜL (ref 0–0.1)
BASOPHILS NFR BLD AUTO: 0 % (ref 0–1)
BILIRUB DIRECT SERPL-MCNC: 0.13 MG/DL (ref 0–0.2)
BILIRUB SERPL-MCNC: 0.34 MG/DL (ref 0.2–1)
BUN SERPL-MCNC: 48 MG/DL (ref 5–25)
CALCIUM SERPL-MCNC: 8.9 MG/DL (ref 8.3–10.1)
CHLORIDE SERPL-SCNC: 104 MMOL/L (ref 100–108)
CO2 SERPL-SCNC: 26 MMOL/L (ref 21–32)
CREAT SERPL-MCNC: 1.72 MG/DL (ref 0.6–1.3)
EOSINOPHIL # BLD AUTO: 0.03 THOUSAND/ΜL (ref 0–0.61)
EOSINOPHIL NFR BLD AUTO: 0 % (ref 0–6)
ERYTHROCYTE [DISTWIDTH] IN BLOOD BY AUTOMATED COUNT: 15.9 % (ref 11.6–15.1)
GFR SERPL CREATININE-BSD FRML MDRD: 25 ML/MIN/1.73SQ M
GLUCOSE SERPL-MCNC: 115 MG/DL (ref 65–140)
GLUCOSE SERPL-MCNC: 48 MG/DL (ref 65–140)
GLUCOSE SERPL-MCNC: 58 MG/DL (ref 65–140)
GLUCOSE SERPL-MCNC: 86 MG/DL (ref 65–140)
GLUCOSE SERPL-MCNC: 97 MG/DL (ref 65–140)
HCT VFR BLD AUTO: 40.1 % (ref 34.8–46.1)
HGB BLD-MCNC: 12.4 G/DL (ref 11.5–15.4)
IMM GRANULOCYTES # BLD AUTO: 0.14 THOUSAND/UL (ref 0–0.2)
IMM GRANULOCYTES NFR BLD AUTO: 1 % (ref 0–2)
LYMPHOCYTES # BLD AUTO: 0.86 THOUSANDS/ΜL (ref 0.6–4.47)
LYMPHOCYTES NFR BLD AUTO: 5 % (ref 14–44)
MCH RBC QN AUTO: 27.1 PG (ref 26.8–34.3)
MCHC RBC AUTO-ENTMCNC: 30.9 G/DL (ref 31.4–37.4)
MCV RBC AUTO: 88 FL (ref 82–98)
MONOCYTES # BLD AUTO: 1.2 THOUSAND/ΜL (ref 0.17–1.22)
MONOCYTES NFR BLD AUTO: 7 % (ref 4–12)
NEUTROPHILS # BLD AUTO: 15.25 THOUSANDS/ΜL (ref 1.85–7.62)
NEUTS SEG NFR BLD AUTO: 87 % (ref 43–75)
NRBC BLD AUTO-RTO: 0 /100 WBCS
PLATELET # BLD AUTO: 408 THOUSANDS/UL (ref 149–390)
PMV BLD AUTO: 9.9 FL (ref 8.9–12.7)
POTASSIUM SERPL-SCNC: 3.8 MMOL/L (ref 3.5–5.3)
PROT SERPL-MCNC: 7 G/DL (ref 6.4–8.2)
RBC # BLD AUTO: 4.57 MILLION/UL (ref 3.81–5.12)
SODIUM SERPL-SCNC: 138 MMOL/L (ref 136–145)
WBC # BLD AUTO: 17.55 THOUSAND/UL (ref 4.31–10.16)

## 2020-12-14 PROCEDURE — 97110 THERAPEUTIC EXERCISES: CPT | Performed by: PHYSICAL THERAPIST

## 2020-12-14 PROCEDURE — 85025 COMPLETE CBC W/AUTO DIFF WBC: CPT | Performed by: STUDENT IN AN ORGANIZED HEALTH CARE EDUCATION/TRAINING PROGRAM

## 2020-12-14 PROCEDURE — A9537 TC99M MEBROFENIN: HCPCS

## 2020-12-14 PROCEDURE — 97112 NEUROMUSCULAR REEDUCATION: CPT | Performed by: PHYSICAL THERAPIST

## 2020-12-14 PROCEDURE — NC001 PR NO CHARGE: Performed by: SURGERY

## 2020-12-14 PROCEDURE — 82948 REAGENT STRIP/BLOOD GLUCOSE: CPT

## 2020-12-14 PROCEDURE — 80076 HEPATIC FUNCTION PANEL: CPT | Performed by: INTERNAL MEDICINE

## 2020-12-14 PROCEDURE — 80048 BASIC METABOLIC PNL TOTAL CA: CPT | Performed by: INTERNAL MEDICINE

## 2020-12-14 PROCEDURE — G1004 CDSM NDSC: HCPCS

## 2020-12-14 PROCEDURE — 99232 SBSQ HOSP IP/OBS MODERATE 35: CPT | Performed by: NURSE PRACTITIONER

## 2020-12-14 PROCEDURE — 78226 HEPATOBILIARY SYSTEM IMAGING: CPT

## 2020-12-14 PROCEDURE — 99232 SBSQ HOSP IP/OBS MODERATE 35: CPT | Performed by: INTERNAL MEDICINE

## 2020-12-14 PROCEDURE — 97116 GAIT TRAINING THERAPY: CPT | Performed by: PHYSICAL THERAPIST

## 2020-12-14 RX ADMIN — SORBITOL SOLUTION (BULK) 30 ML: 70 SOLUTION at 21:20

## 2020-12-14 RX ADMIN — DOCUSATE SODIUM AND SENNOSIDES 1 TABLET: 8.6; 5 TABLET, FILM COATED ORAL at 21:20

## 2020-12-14 RX ADMIN — SORBITOL SOLUTION (BULK) 30 ML: 70 SOLUTION at 09:28

## 2020-12-14 RX ADMIN — PREDNISOLONE ACETATE 1 DROP: 10 SUSPENSION/ DROPS OPHTHALMIC at 09:28

## 2020-12-14 RX ADMIN — ENOXAPARIN SODIUM 40 MG: 40 INJECTION SUBCUTANEOUS at 09:26

## 2020-12-14 RX ADMIN — AMLODIPINE BESYLATE 10 MG: 10 TABLET ORAL at 09:27

## 2020-12-14 RX ADMIN — TIMOLOL MALEATE 1 DROP: 2.5 SOLUTION/ DROPS OPHTHALMIC at 21:20

## 2020-12-14 RX ADMIN — PRAVASTATIN SODIUM 10 MG: 10 TABLET ORAL at 09:27

## 2020-12-14 RX ADMIN — FAMOTIDINE 20 MG: 20 TABLET ORAL at 09:27

## 2020-12-14 RX ADMIN — LISINOPRIL 10 MG: 10 TABLET ORAL at 09:27

## 2020-12-14 RX ADMIN — CARVEDILOL 12.5 MG: 12.5 TABLET, FILM COATED ORAL at 09:27

## 2020-12-14 RX ADMIN — CEFTRIAXONE SODIUM 1000 MG: 10 INJECTION, POWDER, FOR SOLUTION INTRAVENOUS at 23:20

## 2020-12-14 RX ADMIN — FAMOTIDINE 20 MG: 20 TABLET ORAL at 16:38

## 2020-12-14 RX ADMIN — CARVEDILOL 12.5 MG: 12.5 TABLET, FILM COATED ORAL at 16:38

## 2020-12-15 ENCOUNTER — DOCUMENTATION (OUTPATIENT)
Dept: SOCIAL WORK | Facility: HOSPITAL | Age: 85
End: 2020-12-15

## 2020-12-15 VITALS
HEIGHT: 65 IN | SYSTOLIC BLOOD PRESSURE: 164 MMHG | DIASTOLIC BLOOD PRESSURE: 69 MMHG | BODY MASS INDEX: 23.21 KG/M2 | OXYGEN SATURATION: 94 % | HEART RATE: 68 BPM | WEIGHT: 139.33 LBS | TEMPERATURE: 97.4 F | RESPIRATION RATE: 18 BRPM

## 2020-12-15 LAB
ALBUMIN SERPL BCP-MCNC: 2.2 G/DL (ref 3.5–5)
ALP SERPL-CCNC: 84 U/L (ref 46–116)
ALT SERPL W P-5'-P-CCNC: 16 U/L (ref 12–78)
ANION GAP SERPL CALCULATED.3IONS-SCNC: 9 MMOL/L (ref 4–13)
AST SERPL W P-5'-P-CCNC: 18 U/L (ref 5–45)
BASOPHILS # BLD AUTO: 0.06 THOUSANDS/ΜL (ref 0–0.1)
BASOPHILS NFR BLD AUTO: 1 % (ref 0–1)
BILIRUB SERPL-MCNC: 0.23 MG/DL (ref 0.2–1)
BUN SERPL-MCNC: 46 MG/DL (ref 5–25)
CALCIUM ALBUM COR SERPL-MCNC: 10.4 MG/DL (ref 8.3–10.1)
CALCIUM SERPL-MCNC: 9 MG/DL (ref 8.3–10.1)
CHLORIDE SERPL-SCNC: 104 MMOL/L (ref 100–108)
CO2 SERPL-SCNC: 25 MMOL/L (ref 21–32)
CREAT SERPL-MCNC: 1.53 MG/DL (ref 0.6–1.3)
EOSINOPHIL # BLD AUTO: 0.09 THOUSAND/ΜL (ref 0–0.61)
EOSINOPHIL NFR BLD AUTO: 1 % (ref 0–6)
ERYTHROCYTE [DISTWIDTH] IN BLOOD BY AUTOMATED COUNT: 15.9 % (ref 11.6–15.1)
GFR SERPL CREATININE-BSD FRML MDRD: 28 ML/MIN/1.73SQ M
GLUCOSE SERPL-MCNC: 110 MG/DL (ref 65–140)
GLUCOSE SERPL-MCNC: 169 MG/DL (ref 65–140)
GLUCOSE SERPL-MCNC: 86 MG/DL (ref 65–140)
HCT VFR BLD AUTO: 37.3 % (ref 34.8–46.1)
HGB BLD-MCNC: 11.2 G/DL (ref 11.5–15.4)
IMM GRANULOCYTES # BLD AUTO: 0.09 THOUSAND/UL (ref 0–0.2)
IMM GRANULOCYTES NFR BLD AUTO: 1 % (ref 0–2)
LYMPHOCYTES # BLD AUTO: 1.02 THOUSANDS/ΜL (ref 0.6–4.47)
LYMPHOCYTES NFR BLD AUTO: 8 % (ref 14–44)
MCH RBC QN AUTO: 26.4 PG (ref 26.8–34.3)
MCHC RBC AUTO-ENTMCNC: 30 G/DL (ref 31.4–37.4)
MCV RBC AUTO: 88 FL (ref 82–98)
MONOCYTES # BLD AUTO: 1.36 THOUSAND/ΜL (ref 0.17–1.22)
MONOCYTES NFR BLD AUTO: 11 % (ref 4–12)
NEUTROPHILS # BLD AUTO: 10.27 THOUSANDS/ΜL (ref 1.85–7.62)
NEUTS SEG NFR BLD AUTO: 78 % (ref 43–75)
NRBC BLD AUTO-RTO: 0 /100 WBCS
PLATELET # BLD AUTO: 419 THOUSANDS/UL (ref 149–390)
PMV BLD AUTO: 10 FL (ref 8.9–12.7)
POTASSIUM SERPL-SCNC: 3.9 MMOL/L (ref 3.5–5.3)
PROT SERPL-MCNC: 6.3 G/DL (ref 6.4–8.2)
RBC # BLD AUTO: 4.25 MILLION/UL (ref 3.81–5.12)
SODIUM SERPL-SCNC: 138 MMOL/L (ref 136–145)
WBC # BLD AUTO: 12.89 THOUSAND/UL (ref 4.31–10.16)

## 2020-12-15 PROCEDURE — 85025 COMPLETE CBC W/AUTO DIFF WBC: CPT | Performed by: INTERNAL MEDICINE

## 2020-12-15 PROCEDURE — 82948 REAGENT STRIP/BLOOD GLUCOSE: CPT

## 2020-12-15 PROCEDURE — 97116 GAIT TRAINING THERAPY: CPT

## 2020-12-15 PROCEDURE — 99239 HOSP IP/OBS DSCHRG MGMT >30: CPT | Performed by: INTERNAL MEDICINE

## 2020-12-15 PROCEDURE — 80053 COMPREHEN METABOLIC PANEL: CPT | Performed by: INTERNAL MEDICINE

## 2020-12-15 PROCEDURE — 97530 THERAPEUTIC ACTIVITIES: CPT

## 2020-12-15 PROCEDURE — 97110 THERAPEUTIC EXERCISES: CPT

## 2020-12-15 RX ORDER — CIPROFLOXACIN 500 MG/1
500 TABLET, FILM COATED ORAL EVERY 24 HOURS
Qty: 6 TABLET | Refills: 0 | Status: SHIPPED | OUTPATIENT
Start: 2020-12-15 | End: 2020-12-15 | Stop reason: SDUPTHER

## 2020-12-15 RX ORDER — CIPROFLOXACIN 500 MG/1
500 TABLET, FILM COATED ORAL EVERY 24 HOURS
Qty: 3 TABLET | Refills: 0 | Status: SHIPPED | OUTPATIENT
Start: 2020-12-15 | End: 2020-12-18

## 2020-12-15 RX ORDER — CARVEDILOL 25 MG/1
25 TABLET ORAL 2 TIMES DAILY WITH MEALS
Qty: 60 TABLET | Refills: 0 | Status: SHIPPED | OUTPATIENT
Start: 2020-12-15 | End: 2021-03-23 | Stop reason: ALTCHOICE

## 2020-12-15 RX ORDER — CIPROFLOXACIN 500 MG/1
500 TABLET, FILM COATED ORAL EVERY 12 HOURS SCHEDULED
Qty: 6 TABLET | Refills: 0 | Status: SHIPPED | OUTPATIENT
Start: 2020-12-15 | End: 2020-12-15 | Stop reason: SDUPTHER

## 2020-12-15 RX ORDER — AMOXICILLIN 250 MG
1 CAPSULE ORAL
Qty: 20 TABLET | Refills: 0 | Status: SHIPPED | OUTPATIENT
Start: 2020-12-15

## 2020-12-15 RX ORDER — CLONIDINE 0.1 MG/24H
1 PATCH, EXTENDED RELEASE TRANSDERMAL WEEKLY
Qty: 1 PATCH | Refills: 0 | Status: SHIPPED | OUTPATIENT
Start: 2020-12-18 | End: 2021-03-23 | Stop reason: ALTCHOICE

## 2020-12-15 RX ADMIN — AMLODIPINE BESYLATE 10 MG: 10 TABLET ORAL at 10:00

## 2020-12-15 RX ADMIN — ENOXAPARIN SODIUM 40 MG: 40 INJECTION SUBCUTANEOUS at 10:00

## 2020-12-15 RX ADMIN — PRAVASTATIN SODIUM 10 MG: 10 TABLET ORAL at 10:00

## 2020-12-15 RX ADMIN — PREDNISOLONE ACETATE 1 DROP: 10 SUSPENSION/ DROPS OPHTHALMIC at 10:00

## 2020-12-15 RX ADMIN — FAMOTIDINE 20 MG: 20 TABLET ORAL at 10:00

## 2020-12-15 RX ADMIN — LISINOPRIL 10 MG: 10 TABLET ORAL at 10:00

## 2020-12-15 RX ADMIN — CARVEDILOL 12.5 MG: 12.5 TABLET, FILM COATED ORAL at 10:00

## 2020-12-15 RX ADMIN — SORBITOL SOLUTION (BULK) 30 ML: 70 SOLUTION at 10:00

## 2020-12-15 RX ADMIN — INSULIN LISPRO 1 UNITS: 100 INJECTION, SOLUTION INTRAVENOUS; SUBCUTANEOUS at 12:49

## 2021-02-25 ENCOUNTER — OFFICE VISIT (OUTPATIENT)
Dept: WOUND CARE | Facility: HOSPITAL | Age: 86
End: 2021-02-25
Payer: MEDICARE

## 2021-02-25 VITALS
WEIGHT: 140 LBS | RESPIRATION RATE: 18 BRPM | DIASTOLIC BLOOD PRESSURE: 88 MMHG | BODY MASS INDEX: 24.8 KG/M2 | HEART RATE: 74 BPM | TEMPERATURE: 97.3 F | HEIGHT: 63 IN | SYSTOLIC BLOOD PRESSURE: 164 MMHG

## 2021-02-25 DIAGNOSIS — S81.802A OPEN WOUND OF LEFT LOWER LEG, INITIAL ENCOUNTER: Primary | ICD-10-CM

## 2021-02-25 PROCEDURE — 99213 OFFICE O/P EST LOW 20 MIN: CPT | Performed by: FAMILY MEDICINE

## 2021-02-25 PROCEDURE — 99203 OFFICE O/P NEW LOW 30 MIN: CPT | Performed by: FAMILY MEDICINE

## 2021-02-25 PROCEDURE — 97597 DBRDMT OPN WND 1ST 20 CM/<: CPT | Performed by: FAMILY MEDICINE

## 2021-02-25 RX ORDER — LIDOCAINE HYDROCHLORIDE 40 MG/ML
1 SOLUTION TOPICAL ONCE
Status: COMPLETED | OUTPATIENT
Start: 2021-02-25 | End: 2021-02-25

## 2021-02-25 RX ADMIN — LIDOCAINE HYDROCHLORIDE 1 ML: 40 SOLUTION TOPICAL at 13:46

## 2021-02-25 NOTE — ASSESSMENT & PLAN NOTE
Initial evaluation   Debrided as below  Wound management with ploymem  Compression with Tubigrip  Keep leg elevated as much as possible   Followup one-week or call sooner with questions or concerns

## 2021-02-25 NOTE — PROGRESS NOTES
Patient ID: Austen Deleon is a 80 y o  female Date of Birth 5/2/1923     Chief Complaint  Chief Complaint   Patient presents with    New Patient Visit     left lower leg       Allergies  Atorvastatin, Fentanyl, Hydrocodone-acetaminophen, Metronidazole, Penicillins, Sulfa antibiotics, Tramadol, Metoclopramide, and Oxycodone-acetaminophen    Assessment:    Open wound of left lower leg   Initial evaluation   Debrided as below  Wound management with ploymem  Compression with Tubigrip  Keep leg elevated as much as possible   Followup one-week or call sooner with questions or concerns       Diagnoses and all orders for this visit:    Open wound of left lower leg, initial encounter  -     lidocaine (XYLOCAINE) 4 % topical solution 1 mL  -     Wound cleansing and dressings; Future  -     Wound compression and edema control; Future  -     Debridement              Debridement   Wound 02/25/21 Traumatic Leg Left    Universal Protocol:  Consent: Verbal consent obtained  Consent given by: patient  Time out: Immediately prior to procedure a "time out" was called to verify the correct patient, procedure, equipment, support staff and site/side marked as required    Timeout called at: 2/25/2021 1:40 PM   Patient understanding: patient states understanding of the procedure being performed  Patient identity confirmed: verbally with patient      Performed by: physician  Debridement type: selective  Pain control: lidocaine 4%  Post-debridement measurements  Length (cm): 6 5  Width (cm): 5 6  Depth (cm): 0 1  Percent debrided: 50%  Surface Area (cm^2): 36 4  Area debrided (cm^2): 18 2  Volume (cm^3): 3 64  Devitalized tissue debrided: biofilm  Instrument(s) utilized: curette  Bleeding: small  Hemostasis obtained with: pressure  Procedural pain (0-10): 4  Post-procedural pain: 2   Response to treatment: procedure was tolerated well          Plan:     Wound 02/25/21 Traumatic Leg Left (Active)   Wound Image Images linked 02/25/21 6318 Wound Description Granulation tissue; Epithelialization 02/25/21 1340   [place]-wound Assessment Yellow-brown 02/25/21 1340   Wound Length (cm) 6 5 cm 02/25/21 1340   Wound Width (cm) 5 6 cm 02/25/21 1340   Wound Depth (cm) 0 1 cm 02/25/21 1340   Wound Surface Area (cm^2) 36 4 cm^2 02/25/21 1340   Wound Volume (cm^3) 3 64 cm^3 02/25/21 1340   Calculated Wound Volume (cm^3) 3 64 cm^3 02/25/21 1340   Drainage Amount Moderate 02/25/21 1340   Drainage Description Serosanguineous 02/25/21 1340   Non-staged Wound Description Full thickness 02/25/21 1340   Dressing Status Intact 02/25/21 1340       Wound 02/25/21 Traumatic Leg Left (Active)   Date First Assessed/Time First Assessed: 02/25/21 1339   Pre-Existing Wound: Yes  Primary Wound Type: Traumatic  Location: Leg  Wound Location Orientation: Left       Subjective:        Patient presents for initial evaluation a left lower extremity wound that has been present for approximately 2 weeks after she fell going down the stairs and scraped her leg on the steps  She has been using Dermagran on the wound  Patient is currently living with her daughter but normally lives alone  Her daughter who is present with her today has been doing dressing changes, using saline to wash the wound  She has never worn compression before  She typically sits in a recliner with her legs elevated  She is diabetic, last A1c was Dec 2020 and was 6 8  The following portions of the patient's history were reviewed and updated as appropriate: She  has a past medical history of Basal cell carcinoma, Diabetes mellitus ([address] ), Hypertension, and Migraine  She  reports that she has never smoked  She has never used smokeless tobacco  She reports that she does not drink alcohol or use drugs  She is allergic to atorvastatin; fentanyl; hydrocodone-acetaminophen; metronidazole; penicillins; sulfa antibiotics; tramadol; metoclopramide; and oxycodone-acetaminophen       Review of Systems Constitutional: Negative for chills and fever  HENT: Negative for congestion and sneezing  Respiratory: Negative for cough  Cardiovascular: Positive for leg swelling  Musculoskeletal: Positive for gait problem  Skin: Positive for wound  Psychiatric/Behavioral: Negative for agitation  Objective:       Wound 02/25/21 Traumatic Leg Left (Active)   Wound Image Images linked 02/25/21 1339   Wound Description Granulation tissue; Epithelialization 02/25/21 1340   [place]-wound Assessment Yellow-brown 02/25/21 1340   Wound Length (cm) 6 5 cm 02/25/21 1340   Wound Width (cm) 5 6 cm 02/25/21 1340   Wound Depth (cm) 0 1 cm 02/25/21 1340   Wound Surface Area (cm^2) 36 4 cm^2 02/25/21 1340   Wound Volume (cm^3) 3 64 cm^3 02/25/21 1340   Calculated Wound Volume (cm^3) 3 64 cm^3 02/25/21 1340   Drainage Amount Moderate 02/25/21 1340   Drainage Description Serosanguineous 02/25/21 1340   Non-staged Wound Description Full thickness 02/25/21 1340   Dressing Status Intact 02/25/21 1340       /88   Pulse 74   Temp (!) 97 3 °F (36 3 °C)   Resp 18   Ht 5' 3" (1 6 m)   Wt 63 5 kg (140 lb)   BMI 24 80 kg/m²     Physical Exam  Constitutional:       General: She is not in acute distress  Appearance: Normal appearance  She is not ill-appearing, toxic-appearing or diaphoretic  HENT:      Head: Normocephalic and atraumatic  Right Ear: External ear normal       Left Ear: External ear normal    Eyes:      Conjunctiva/sclera: Conjunctivae normal    Neck:      Musculoskeletal: Neck supple  Pulmonary:      Effort: Pulmonary effort is normal  No respiratory distress  Musculoskeletal:      Left lower leg: Edema present  Skin:     Comments: See wound assessment   Neurological:      Mental Status: She is alert  Gait: Gait abnormal (uses walker)     Psychiatric:         Mood and Affect: Mood normal          Behavior: Behavior normal            Wound Instructions:  Orders Placed This Encounter Procedures    Wound cleansing and dressings     LLE wound:  Wound care weekly at the wound center  Please do not get wet with shower water, sponge bathe  At wound center today:  Wound cleansed with NSS and patted dry  Polymem applied to wound covered with ABD secured with mary  Standing Status:   Future     Standing Expiration Date:   2/25/2022    Wound compression and edema control     LLE wound: spandagrip size E from base of toes to base of knee  Apply in AM, may remove for sleep  Avoid prolonged standing in one place  Elevate leg(s) above the level of the heart when sitting or as much as possible  Standing Status:   Future     Standing Expiration Date:   2/25/2022    Debridement     This order was created via procedure documentation        Diagnosis ICD-10-CM Associated Orders   1   Open wound of left lower leg, initial encounter  S81 802A lidocaine (XYLOCAINE) 4 % topical solution 1 mL     Wound cleansing and dressings     Wound compression and edema control     Debridement

## 2021-02-25 NOTE — PATIENT INSTRUCTIONS
Orders Placed This Encounter   Procedures    Wound cleansing and dressings     LLE wound:  Wound care weekly at the wound center  Please do not get wet with shower water, sponge bathe  At wound center today:  Wound cleansed with NSS and patted dry  Polymem applied to wound covered with ABD secured with mary  Standing Status:   Future     Standing Expiration Date:   2/25/2022    Wound compression and edema control     LLE wound: spandagrip size E from base of toes to base of knee  Apply in AM, may remove for sleep  Avoid prolonged standing in one place  Elevate leg(s) above the level of the heart when sitting or as much as possible       Standing Status:   Future     Standing Expiration Date:   2/25/2022

## 2021-03-04 ENCOUNTER — OFFICE VISIT (OUTPATIENT)
Dept: WOUND CARE | Facility: HOSPITAL | Age: 86
End: 2021-03-04
Payer: MEDICARE

## 2021-03-04 VITALS
HEART RATE: 76 BPM | TEMPERATURE: 97.6 F | SYSTOLIC BLOOD PRESSURE: 144 MMHG | DIASTOLIC BLOOD PRESSURE: 80 MMHG | RESPIRATION RATE: 18 BRPM

## 2021-03-04 DIAGNOSIS — S81.802A OPEN WOUND OF LEFT LOWER LEG, INITIAL ENCOUNTER: Primary | ICD-10-CM

## 2021-03-04 PROCEDURE — 97597 DBRDMT OPN WND 1ST 20 CM/<: CPT | Performed by: FAMILY MEDICINE

## 2021-03-04 RX ORDER — LIDOCAINE HYDROCHLORIDE 40 MG/ML
5 SOLUTION TOPICAL ONCE
Status: COMPLETED | OUTPATIENT
Start: 2021-03-04 | End: 2021-03-04

## 2021-03-04 RX ADMIN — LIDOCAINE HYDROCHLORIDE 5 ML: 40 SOLUTION TOPICAL at 13:54

## 2021-03-04 NOTE — ASSESSMENT & PLAN NOTE
Wound is much improved  Debrided as below   Continue wound management with polymem  Continue compression with Tubigrip  Elevate leg as much as possible   Control DM   Followup one-week or call sooner with questions or concerns

## 2021-03-04 NOTE — PROGRESS NOTES
Patient ID: Den Coroando is a 80 y o  female Date of Birth 5/2/1923     Chief Complaint  Chief Complaint   Patient presents with    Follow Up Wound Care Visit     left leg wound       Allergies  Atorvastatin, Fentanyl, Hydrocodone-acetaminophen, Metronidazole, Penicillins, Sulfa antibiotics, Tramadol, Metoclopramide, and Oxycodone-acetaminophen    Assessment:    Open wound of left lower leg   Wound is much improved  Debrided as below   Continue wound management with polymem  Continue compression with Tubigrip  Elevate leg as much as possible   Control DM   Followup one-week or call sooner with questions or concerns       Diagnoses and all orders for this visit:    Open wound of left lower leg, initial encounter  -     lidocaine (XYLOCAINE) 4 % topical solution 5 mL  -     Wound cleansing and dressings  -     Wound compression and edema control; Future  -     Debridement              Debridement   Wound 02/25/21 Traumatic Leg Left    Universal Protocol:  Consent: Verbal consent obtained  Consent given by: patient  Time out: Immediately prior to procedure a "time out" was called to verify the correct patient, procedure, equipment, support staff and site/side marked as required    Timeout called at: 3/4/2021 2:00 PM   Patient understanding: patient states understanding of the procedure being performed  Patient identity confirmed: verbally with patient      Performed by: physician  Debridement type: selective  Pain control: lidocaine 4%  Post-debridement measurements  Length (cm): 5 4  Width (cm): 4 6  Depth (cm): 0 1  Percent debrided: 10%  Surface Area (cm^2): 24 84  Area debrided (cm^2): 2 48  Volume (cm^3): 2 48  Devitalized tissue debrided: biofilm  Instrument(s) utilized: curette  Bleeding: small  Hemostasis obtained with: pressure  Procedural pain (0-10): 5  Post-procedural pain: 3   Response to treatment: procedure was tolerated well          Plan:     Wound 02/25/21 Traumatic Leg Left (Active)   Wound Image Images linked 03/04/21 1341   Wound Description Beefy red;Epithelialization;Granulation tissue; Hypergranulation;Pink 03/04/21 1345   [place]-wound Assessment Dry;Edema;Scaly; Yellow-brown 03/04/21 1345   Wound Length (cm) 5 4 cm 03/04/21 1345   Wound Width (cm) 4 6 cm 03/04/21 1345   Wound Depth (cm) 0 1 cm 03/04/21 1345   Wound Surface Area (cm^2) 24 84 cm^2 03/04/21 1345   Wound Volume (cm^3) 2 48 cm^3 03/04/21 1345   Calculated Wound Volume (cm^3) 2 48 cm^3 03/04/21 1345   Change in Wound Size % 31 87 03/04/21 1345   Drainage Amount Large 03/04/21 1345   Drainage Description Serosanguineous; Bloody 03/04/21 1345   Non-staged Wound Description Full thickness 03/04/21 1345       Wound 02/25/21 Traumatic Leg Left (Active)   Date First Assessed/Time First Assessed: 02/25/21 1339   Pre-Existing Wound: Yes  Primary Wound Type: Traumatic  Location: Leg  Wound Location Orientation: Left       Subjective:           2/25/21: Patient presents for initial evaluation a left lower extremity wound that has been present for approximately 2 weeks after she fell going down the stairs and scraped her leg on the steps  She has been using Dermagran on the wound  Patient is currently living with her daughter but normally lives alone  Her daughter who is present with her today has been doing dressing changes, using saline to wash the wound  She has never worn compression before  She typically sits in a recliner with her legs elevated  She is diabetic, last A1c was Dec 2020 and was 6 8       3/4/21:  Patient presents for followup of a left lower extremity wound  No significant pain or drainage  Has had polymem on the wound and Tubigrip for compression  The following portions of the patient's history were reviewed and updated as appropriate: She  has a past medical history of Basal cell carcinoma, Diabetes mellitus ([address] ), Hypertension, and Migraine  She  reports that she has never smoked   She has never used smokeless tobacco  She reports that she does not drink alcohol or use drugs  She is allergic to atorvastatin; fentanyl; hydrocodone-acetaminophen; metronidazole; penicillins; sulfa antibiotics; tramadol; metoclopramide; and oxycodone-acetaminophen       Review of Systems   Constitutional: Negative for chills and fever  HENT: Negative for congestion and sneezing  Respiratory: Negative for cough  Skin: Positive for wound  Psychiatric/Behavioral: Negative for agitation  Objective:       Wound 02/25/21 Traumatic Leg Left (Active)   Wound Image Images linked 03/04/21 1341   Wound Description Beefy red;Epithelialization;Granulation tissue; Hypergranulation;Pink 03/04/21 1345   [place]-wound Assessment Dry;Edema;Scaly; Yellow-brown 03/04/21 1345   Wound Length (cm) 5 4 cm 03/04/21 1345   Wound Width (cm) 4 6 cm 03/04/21 1345   Wound Depth (cm) 0 1 cm 03/04/21 1345   Wound Surface Area (cm^2) 24 84 cm^2 03/04/21 1345   Wound Volume (cm^3) 2 48 cm^3 03/04/21 1345   Calculated Wound Volume (cm^3) 2 48 cm^3 03/04/21 1345   Change in Wound Size % 31 87 03/04/21 1345   Drainage Amount Large 03/04/21 1345   Drainage Description Serosanguineous; Bloody 03/04/21 1345   Non-staged Wound Description Full thickness 03/04/21 1345       /80   Pulse 76   Temp 97 6 °F (36 4 °C)   Resp 18     Physical Exam      Wound Instructions:  Orders Placed This Encounter   Procedures    Wound cleansing and dressings     LLE wound:  Wound care weekly at the wound center  Please do not get wet with shower water, sponge bathe      At wound center today:  Wound cleansed with NSS and patted dry    Polymem applied to wound covered with ABD secured with kerlix and tape            Wound compression and edema control      Wound compression and edema control      LLE wound: spandagrip size E from base of toes to base of knee      Apply in AM, may remove for sleep      Avoid prolonged standing in one place      Elevate leg(s) above the level of the heart when sitting or as much     Standing Status:   Future     Standing Expiration Date:   3/4/2022    Debridement     This order was created via procedure documentation        Diagnosis ICD-10-CM Associated Orders   1   Open wound of left lower leg, initial encounter  S81 802A lidocaine (XYLOCAINE) 4 % topical solution 5 mL     Wound cleansing and dressings     Wound compression and edema control     Debridement

## 2021-03-04 NOTE — PATIENT INSTRUCTIONS
Orders Placed This Encounter   Procedures    Wound cleansing and dressings     LLE wound:  Wound care weekly at the wound center  Please do not get wet with shower water, sponge bathe      At wound center today:  Wound cleansed with NSS and patted dry    Polymem applied to wound covered with ABD secured with kerlix and tape            Wound compression and edema control      Wound compression and edema control      LLE wound: spandagrip size E from base of toes to base of knee      Apply in AM, may remove for sleep      Avoid prolonged standing in one place      Elevate leg(s) above the level of the heart when sitting or as much     Standing Status:   Future     Standing Expiration Date:   3/4/2022

## 2021-03-11 ENCOUNTER — OFFICE VISIT (OUTPATIENT)
Dept: WOUND CARE | Facility: HOSPITAL | Age: 86
End: 2021-03-11
Payer: MEDICARE

## 2021-03-11 VITALS
DIASTOLIC BLOOD PRESSURE: 80 MMHG | TEMPERATURE: 97.3 F | SYSTOLIC BLOOD PRESSURE: 146 MMHG | HEART RATE: 72 BPM | RESPIRATION RATE: 18 BRPM

## 2021-03-11 DIAGNOSIS — S81.802A OPEN WOUND OF LEFT LOWER LEG, INITIAL ENCOUNTER: Primary | ICD-10-CM

## 2021-03-11 PROCEDURE — 97597 DBRDMT OPN WND 1ST 20 CM/<: CPT | Performed by: FAMILY MEDICINE

## 2021-03-11 RX ORDER — LIDOCAINE HYDROCHLORIDE 40 MG/ML
5 SOLUTION TOPICAL ONCE
Status: COMPLETED | OUTPATIENT
Start: 2021-03-11 | End: 2021-03-11

## 2021-03-11 RX ADMIN — LIDOCAINE HYDROCHLORIDE 5 ML: 40 SOLUTION TOPICAL at 14:50

## 2021-03-11 NOTE — PROGRESS NOTES
Patient ID: Sho Sims is a 80 y o  female Date of Birth 5/2/1923     Chief Complaint  Chief Complaint   Patient presents with    Follow Up Wound Care Visit     LLE wound       Allergies  Atorvastatin, Fentanyl, Hydrocodone-acetaminophen, Metronidazole, Penicillins, Sulfa antibiotics, Tramadol, Metoclopramide, and Oxycodone-acetaminophen    Assessment:    Open wound of left lower leg    Wound is improved   Debrided as below   Continue wound management with polymem   Ok to stop compression if it is uncomfortable but I do advise patient keep her leg elevated as much as possible   followup one-week or call sooner questions or concerns       Diagnoses and all orders for this visit:    Open wound of left lower leg, initial encounter  -     lidocaine (XYLOCAINE) 4 % topical solution 5 mL  -     Wound cleansing and dressings; Future  -     Debridement              Debridement   Wound 02/25/21 Traumatic Leg Left    Universal Protocol:  Consent: Verbal consent obtained  Consent given by: patient  Time out: Immediately prior to procedure a "time out" was called to verify the correct patient, procedure, equipment, support staff and site/side marked as required    Timeout called at: 3/11/2021 2:45 PM   Patient understanding: patient states understanding of the procedure being performed  Patient identity confirmed: verbally with patient      Performed by: physician  Debridement type: selective  Pain control: lidocaine 4%  Post-debridement measurements  Length (cm): 1 5  Width (cm): 1 2  Depth (cm): 0 1  Percent debrided: 75%  Surface Area (cm^2): 1 8  Area debrided (cm^2): 1 35  Volume (cm^3): 0 18  Devitalized tissue debrided: biofilm  Instrument(s) utilized: curette  Bleeding: small  Hemostasis obtained with: pressure  Procedural pain (0-10): 0  Post-procedural pain: 0   Response to treatment: procedure was tolerated well          Plan:     Wound 02/25/21 Traumatic Leg Left (Active)   Wound Image Images linked 03/11/21 1449   Wound Description Epithelialization;Yellow;Slough;Pink 03/11/21 1449   [place]-wound Assessment Hyperpigmented;Edema;Dry;Scaly 03/11/21 1449   Wound Length (cm) 1 5 cm 03/11/21 1449   Wound Width (cm) 1 2 cm 03/11/21 1449   Wound Depth (cm) 0 1 cm 03/11/21 1449   Wound Surface Area (cm^2) 1 8 cm^2 03/11/21 1449   Wound Volume (cm^3) 0 18 cm^3 03/11/21 1449   Calculated Wound Volume (cm^3) 0 18 cm^3 03/11/21 1449   Change in Wound Size % 95 05 03/11/21 1449   Drainage Amount Scant 03/11/21 1449   Drainage Description Serosanguineous 03/11/21 1449   Non-staged Wound Description Full thickness 03/11/21 1449       Wound 02/25/21 Traumatic Leg Left (Active)   Date First Assessed/Time First Assessed: 02/25/21 1339   Pre-Existing Wound: Yes  Primary Wound Type: Traumatic  Location: Leg  Wound Location Orientation: Left       Subjective:           2/25/21: Patient presents for initial evaluation a left lower extremity wound that has been present for approximately 2 weeks after she fell going down the stairs and scraped her leg on the steps  She has been using Dermagran on the wound  Patient is currently living with her daughter but normally lives alone  Her daughter who is present with her today has been doing dressing changes, using saline to wash the wound  She has never worn compression before  She typically sits in a recliner with her legs elevated  She is diabetic, last A1c was Dec 2020 and was 6 8        3/4/21:  Patient presents for followup of a left lower extremity wound  No significant pain or drainage  Has had polymem on the wound and Tubigrip for compression  3/11/21:   Patient presents for followup of left lower extremity wound  No significant pain or drainage  Has been using polymem on the wound and Tubigrip for compression  Patient is complaining of discomfort with the Tubigrip so her daughter allowed her to take it off yesterday        The following portions of the patient's history were reviewed and updated as appropriate: She  has a past medical history of Basal cell carcinoma, Diabetes mellitus ([address] ), Hypertension, and Migraine  She  reports that she has never smoked  She has never used smokeless tobacco  She reports that she does not drink alcohol or use drugs  She is allergic to atorvastatin; fentanyl; hydrocodone-acetaminophen; metronidazole; penicillins; sulfa antibiotics; tramadol; metoclopramide; and oxycodone-acetaminophen       Review of Systems   Constitutional: Negative for chills and fever  HENT: Negative for congestion and sneezing  Respiratory: Negative for cough  Musculoskeletal: Positive for gait problem  Skin: Positive for wound  Psychiatric/Behavioral: Negative for agitation  Objective:       Wound 02/25/21 Traumatic Leg Left (Active)   Wound Image Images linked 03/11/21 1449   Wound Description Epithelialization;Yellow;Slough;Pink 03/11/21 1449   [place]-wound Assessment Hyperpigmented;Edema;Dry;Scaly 03/11/21 1449   Wound Length (cm) 1 5 cm 03/11/21 1449   Wound Width (cm) 1 2 cm 03/11/21 1449   Wound Depth (cm) 0 1 cm 03/11/21 1449   Wound Surface Area (cm^2) 1 8 cm^2 03/11/21 1449   Wound Volume (cm^3) 0 18 cm^3 03/11/21 1449   Calculated Wound Volume (cm^3) 0 18 cm^3 03/11/21 1449   Change in Wound Size % 95 05 03/11/21 1449   Drainage Amount Scant 03/11/21 1449   Drainage Description Serosanguineous 03/11/21 1449   Non-staged Wound Description Full thickness 03/11/21 1449       /80   Pulse 72   Temp (!) 97 3 °F (36 3 °C)   Resp 18     Physical Exam      Wound Instructions:  Orders Placed This Encounter   Procedures    Wound cleansing and dressings     LLE wound:  Wound care weekly at the wound center  Please do not get wet with shower water, sponge bathe      At wound center today:  Wound cleansed with NSS and patted dry    Polymem applied to wound covered with ABD secured with kerlix and tape                                  Treatments above were completed today at the [facility]     Standing Status:   Future     Standing Expiration Date:   3/11/2022    Debridement     This order was created via procedure documentation        Diagnosis ICD-10-CM Associated Orders   1   Open wound of left lower leg, initial encounter  S81 802A lidocaine (XYLOCAINE) 4 % topical solution 5 mL     Wound cleansing and dressings     Debridement

## 2021-03-11 NOTE — PATIENT INSTRUCTIONS
Orders Placed This Encounter   Procedures    Wound cleansing and dressings     LLE wound:  Wound care weekly at the wound center  Please do not get wet with shower water, sponge bathe      At wound center today:  Wound cleansed with NSS and patted dry    Polymem applied to wound covered with ABD secured with kerlix and tape                                  Treatments above were completed today at the [facility]     Standing Status:   Future     Standing Expiration Date:   3/11/2022

## 2021-03-11 NOTE — ASSESSMENT & PLAN NOTE
Wound is improved   Debrided as below   Continue wound management with polymem   Ok to stop compression if it is uncomfortable but I do advise patient keep her leg elevated as much as possible   followup one-week or call sooner questions or concerns

## 2021-03-15 ENCOUNTER — APPOINTMENT (EMERGENCY)
Dept: RADIOLOGY | Facility: HOSPITAL | Age: 86
DRG: 390 | End: 2021-03-15
Payer: MEDICARE

## 2021-03-15 ENCOUNTER — HOSPITAL ENCOUNTER (INPATIENT)
Facility: HOSPITAL | Age: 86
LOS: 4 days | Discharge: NURSING, REHAB OR LONG-TERM CARE | DRG: 390 | End: 2021-03-19
Attending: EMERGENCY MEDICINE | Admitting: SURGERY
Payer: MEDICARE

## 2021-03-15 DIAGNOSIS — K56.609 SMALL BOWEL OBSTRUCTION (HCC): Primary | ICD-10-CM

## 2021-03-15 LAB
ALBUMIN SERPL BCP-MCNC: 3.1 G/DL (ref 3.5–5)
ALP SERPL-CCNC: 101 U/L (ref 46–116)
ALT SERPL W P-5'-P-CCNC: 11 U/L (ref 12–78)
ANION GAP SERPL CALCULATED.3IONS-SCNC: 3 MMOL/L (ref 4–13)
AST SERPL W P-5'-P-CCNC: 13 U/L (ref 5–45)
BASOPHILS # BLD AUTO: 0.06 THOUSANDS/ΜL (ref 0–0.1)
BASOPHILS NFR BLD AUTO: 0 % (ref 0–1)
BILIRUB SERPL-MCNC: 0.47 MG/DL (ref 0.2–1)
BUN SERPL-MCNC: 23 MG/DL (ref 5–25)
CALCIUM ALBUM COR SERPL-MCNC: 10 MG/DL (ref 8.3–10.1)
CALCIUM SERPL-MCNC: 9.3 MG/DL (ref 8.3–10.1)
CHLORIDE SERPL-SCNC: 108 MMOL/L (ref 100–108)
CO2 SERPL-SCNC: 30 MMOL/L (ref 21–32)
CREAT SERPL-MCNC: 1.16 MG/DL (ref 0.6–1.3)
EOSINOPHIL # BLD AUTO: 0.18 THOUSAND/ΜL (ref 0–0.61)
EOSINOPHIL NFR BLD AUTO: 1 % (ref 0–6)
ERYTHROCYTE [DISTWIDTH] IN BLOOD BY AUTOMATED COUNT: 16.1 % (ref 11.6–15.1)
GFR SERPL CREATININE-BSD FRML MDRD: 40 ML/MIN/1.73SQ M
GLUCOSE SERPL-MCNC: 116 MG/DL (ref 65–140)
HCT VFR BLD AUTO: 39.6 % (ref 34.8–46.1)
HGB BLD-MCNC: 11.6 G/DL (ref 11.5–15.4)
IMM GRANULOCYTES # BLD AUTO: 0.04 THOUSAND/UL (ref 0–0.2)
IMM GRANULOCYTES NFR BLD AUTO: 0 % (ref 0–2)
LIPASE SERPL-CCNC: 30 U/L (ref 73–393)
LYMPHOCYTES # BLD AUTO: 1.58 THOUSANDS/ΜL (ref 0.6–4.47)
LYMPHOCYTES NFR BLD AUTO: 12 % (ref 14–44)
MCH RBC QN AUTO: 26 PG (ref 26.8–34.3)
MCHC RBC AUTO-ENTMCNC: 29.3 G/DL (ref 31.4–37.4)
MCV RBC AUTO: 89 FL (ref 82–98)
MONOCYTES # BLD AUTO: 1.21 THOUSAND/ΜL (ref 0.17–1.22)
MONOCYTES NFR BLD AUTO: 9 % (ref 4–12)
NEUTROPHILS # BLD AUTO: 10.66 THOUSANDS/ΜL (ref 1.85–7.62)
NEUTS SEG NFR BLD AUTO: 78 % (ref 43–75)
NRBC BLD AUTO-RTO: 0 /100 WBCS
PLATELET # BLD AUTO: 618 THOUSANDS/UL (ref 149–390)
PMV BLD AUTO: 9.6 FL (ref 8.9–12.7)
POTASSIUM SERPL-SCNC: 3.5 MMOL/L (ref 3.5–5.3)
PROT SERPL-MCNC: 7.4 G/DL (ref 6.4–8.2)
RBC # BLD AUTO: 4.46 MILLION/UL (ref 3.81–5.12)
SODIUM SERPL-SCNC: 141 MMOL/L (ref 136–145)
WBC # BLD AUTO: 13.73 THOUSAND/UL (ref 4.31–10.16)

## 2021-03-15 PROCEDURE — 96376 TX/PRO/DX INJ SAME DRUG ADON: CPT

## 2021-03-15 PROCEDURE — 99285 EMERGENCY DEPT VISIT HI MDM: CPT

## 2021-03-15 PROCEDURE — 96361 HYDRATE IV INFUSION ADD-ON: CPT

## 2021-03-15 PROCEDURE — 96374 THER/PROPH/DIAG INJ IV PUSH: CPT

## 2021-03-15 PROCEDURE — 36415 COLL VENOUS BLD VENIPUNCTURE: CPT | Performed by: EMERGENCY MEDICINE

## 2021-03-15 PROCEDURE — G1004 CDSM NDSC: HCPCS

## 2021-03-15 PROCEDURE — 74177 CT ABD & PELVIS W/CONTRAST: CPT

## 2021-03-15 PROCEDURE — 85025 COMPLETE CBC W/AUTO DIFF WBC: CPT | Performed by: EMERGENCY MEDICINE

## 2021-03-15 PROCEDURE — 80053 COMPREHEN METABOLIC PANEL: CPT | Performed by: EMERGENCY MEDICINE

## 2021-03-15 PROCEDURE — 99285 EMERGENCY DEPT VISIT HI MDM: CPT | Performed by: EMERGENCY MEDICINE

## 2021-03-15 PROCEDURE — 96375 TX/PRO/DX INJ NEW DRUG ADDON: CPT

## 2021-03-15 PROCEDURE — 83690 ASSAY OF LIPASE: CPT | Performed by: EMERGENCY MEDICINE

## 2021-03-15 RX ORDER — ONDANSETRON 2 MG/ML
4 INJECTION INTRAMUSCULAR; INTRAVENOUS ONCE
Status: COMPLETED | OUTPATIENT
Start: 2021-03-15 | End: 2021-03-15

## 2021-03-15 RX ORDER — HEPARIN SODIUM 5000 [USP'U]/ML
5000 INJECTION, SOLUTION INTRAVENOUS; SUBCUTANEOUS EVERY 8 HOURS SCHEDULED
Status: DISCONTINUED | OUTPATIENT
Start: 2021-03-15 | End: 2021-03-19 | Stop reason: HOSPADM

## 2021-03-15 RX ORDER — HYDROMORPHONE HCL/PF 1 MG/ML
0.2 SYRINGE (ML) INJECTION ONCE
Status: COMPLETED | OUTPATIENT
Start: 2021-03-15 | End: 2021-03-15

## 2021-03-15 RX ORDER — HYDROMORPHONE HCL/PF 1 MG/ML
0.5 SYRINGE (ML) INJECTION
Status: DISCONTINUED | OUTPATIENT
Start: 2021-03-15 | End: 2021-03-19 | Stop reason: HOSPADM

## 2021-03-15 RX ORDER — HYDROMORPHONE HCL/PF 1 MG/ML
0.2 SYRINGE (ML) INJECTION
Status: DISCONTINUED | OUTPATIENT
Start: 2021-03-15 | End: 2021-03-19 | Stop reason: HOSPADM

## 2021-03-15 RX ORDER — ACETAMINOPHEN 325 MG/1
975 TABLET ORAL ONCE
Status: COMPLETED | OUTPATIENT
Start: 2021-03-15 | End: 2021-03-15

## 2021-03-15 RX ORDER — POTASSIUM CHLORIDE 14.9 MG/ML
20 INJECTION INTRAVENOUS
Status: DISPENSED | OUTPATIENT
Start: 2021-03-15 | End: 2021-03-16

## 2021-03-15 RX ORDER — SODIUM CHLORIDE, SODIUM LACTATE, POTASSIUM CHLORIDE, CALCIUM CHLORIDE 600; 310; 30; 20 MG/100ML; MG/100ML; MG/100ML; MG/100ML
100 INJECTION, SOLUTION INTRAVENOUS CONTINUOUS
Status: DISCONTINUED | OUTPATIENT
Start: 2021-03-15 | End: 2021-03-17

## 2021-03-15 RX ORDER — ONDANSETRON 2 MG/ML
4 INJECTION INTRAMUSCULAR; INTRAVENOUS EVERY 6 HOURS PRN
Status: DISCONTINUED | OUTPATIENT
Start: 2021-03-15 | End: 2021-03-19 | Stop reason: HOSPADM

## 2021-03-15 RX ADMIN — FAMOTIDINE 20 MG: 10 INJECTION INTRAVENOUS at 21:49

## 2021-03-15 RX ADMIN — ACETAMINOPHEN 975 MG: 325 TABLET, FILM COATED ORAL at 20:21

## 2021-03-15 RX ADMIN — ONDANSETRON 4 MG: 2 INJECTION INTRAMUSCULAR; INTRAVENOUS at 21:47

## 2021-03-15 RX ADMIN — ONDANSETRON 4 MG: 2 INJECTION INTRAMUSCULAR; INTRAVENOUS at 19:49

## 2021-03-15 RX ADMIN — SODIUM CHLORIDE 1000 ML: 0.9 INJECTION, SOLUTION INTRAVENOUS at 19:49

## 2021-03-15 RX ADMIN — IOHEXOL 100 ML: 350 INJECTION, SOLUTION INTRAVENOUS at 20:41

## 2021-03-15 RX ADMIN — HYDROMORPHONE HYDROCHLORIDE 0.2 MG: 1 INJECTION, SOLUTION INTRAMUSCULAR; INTRAVENOUS; SUBCUTANEOUS at 21:46

## 2021-03-15 NOTE — ED ATTENDING ATTESTATION
3/15/2021  IJorje DO, saw and evaluated the patient  I have discussed the patient with the resident/non-physician practitioner and agree with the resident's/non-physician practitioner's findings, Plan of Care, and MDM as documented in the resident's/non-physician practitioner's note, except where noted  All available labs and Radiology studies were reviewed  I was present for key portions of any procedure(s) performed by the resident/non-physician practitioner and I was immediately available to provide assistance  At this point I agree with the current assessment done in the Emergency Department  I have conducted an independent evaluation of this patient a history and physical is as follows:    Patient is a 60-year-old female, says she woke about 10:00 a m  This morning with some mild epigastric pain which since then has gotten little bit worse  It is worse with nothing and better with nothing, had some nausea and then 1 episode of nonbloody, nonbilious emesis just prior to coming to ED room  Now still feels a bit nauseous  She had 2 normal bowel movements today, despite nurse's notes there was no diarrhea  There is no blood or mucus  Her pain is mild in severity, has just gotten more severe but has not moved or shifted  There is no anorexia although she is afraid that eating might make her vomit  No dysuria, no hematuria, no vaginal bleeding or discharge  No chest pain, no palpitations  No travel history, no sick contacts  Never had this pain before  General:  Patient is well-appearing  Head:  Atraumatic  Eyes:  Conjunctiva pink  ENT:  Mucous membranes are moist  Neck:  Supple  Cardiac:  S1-S2, without murmurs  Lungs:  Clear to auscultation bilaterally  Abdomen:  Mild periumbilical tenderness, no tympany, no rigidity, no guarding, no CVA tenderness    Extremities:  Normal range of motion  Neurologic:  Awake, fluent speech, normal comprehension, AAOx3  Skin:  Pink warm and dry  Psychiatric:  Alert, pleasant, cooperative        ED Course  ED Course as of Mar 15 2349   Mon Mar 15, 2021   2111 Call from radiology, minimally dilated small bowel, no transition point, more likely ileus vs SBO  Slight stranding around ileum, possible diverticulitis        CT abdomen pelvis with contrast   Final Result      Fluid-filled loops of small bowel measuring up to 3 cm with surrounding inflammatory changes  No definite transition point  Findings may represent early small bowel obstruction versus ileus  Recommend small bowel follow-through for further evaluation  Colonic diverticulosis with minimal inflammatory changes around the sigmoid colon which may represent acute diverticulitis  No drainable fluid collection  If not already performed recently, colonoscopy after the acute illness is recommended to rule out possibility of colon cancer mimicking diverticulitis  Markedly distended gallbladder similar to prior study  If there is concern for acute cholecystitis ultrasound should be obtained               I personally discussed this study with Kenyon Holden on 3/15/2021 at 8:59 PM                            Workstation performed: [id number]             Labs Reviewed   CBC AND DIFFERENTIAL - Abnormal       Result Value Ref Range Status    WBC 13 73 (*) 4 31 - 10 16 Thousand/uL Final    RBC 4 46  3 81 - 5 12 Million/uL Final    Hemoglobin 11 6  11 5 - 15 4 g/dL Final    Hematocrit 39 6  34 8 - 46 1 % Final    MCV 89  82 - 98 fL Final    MCH 26 0 (*) 26 8 - 34 3 pg Final    MCHC 29 3 (*) 31 4 - 37 4 g/dL Final    RDW 16 1 (*) 11 6 - 15 1 % Final    MPV 9 6  8 9 - 12 7 fL Final    Platelets 344 (*) 136 - 390 Thousands/uL Final    nRBC 0  /100 WBCs Final    Neutrophils Relative 78 (*) 43 - 75 % Final    Immat GRANS % 0  0 - 2 % Final    Lymphocytes Relative 12 (*) 14 - 44 % Final    Monocytes Relative 9  4 - 12 % Final    Eosinophils Relative 1  0 - 6 % Final    Basophils Relative 0  0 - 1 % Final    Neutrophils Absolute 10 66 (*) 1 85 - 7 62 Thousands/µL Final    Immature Grans Absolute 0 04  0 00 - 0 20 Thousand/uL Final    Lymphocytes Absolute 1 58  0 60 - 4 47 Thousands/µL Final    Monocytes Absolute 1 21  0 17 - 1 22 Thousand/µL Final    Eosinophils Absolute 0 18  0 00 - 0 61 Thousand/µL Final    Basophils Absolute 0 06  0 00 - 0 10 Thousands/µL Final   COMPREHENSIVE METABOLIC PANEL - Abnormal    Sodium 141  136 - 145 mmol/L Final    Potassium 3 5  3 5 - 5 3 mmol/L Final    Chloride 108  100 - 108 mmol/L Final    CO2 30  21 - 32 mmol/L Final    ANION GAP 3 (*) 4 - 13 mmol/L Final    BUN 23  5 - 25 mg/dL Final    Creatinine 1 16  0 60 - 1 30 mg/dL Final    Comment: Standardized to IDMS reference method    Glucose 116  65 - 140 mg/dL Final    Comment: If the patient is fasting, the ADA then defines impaired fasting glucose as > 100 mg/dL and diabetes as > or equal to 123 mg/dL  Specimen collection should occur prior to Sulfasalazine administration due to the potential for falsely depressed results  Specimen collection should occur prior to Sulfapyridine administration due to the potential for falsely elevated results  Calcium 9 3  8 3 - 10 1 mg/dL Final    Corrected Calcium 10 0  8 3 - 10 1 mg/dL Final    AST 13  5 - 45 U/L Final    Comment: Specimen collection should occur prior to Sulfasalazine administration due to the potential for falsely depressed results  ALT 11 (*) 12 - 78 U/L Final    Comment: Specimen collection should occur prior to Sulfasalazine and/or Sulfapyridine administration due to the potential for falsely depressed results  Alkaline Phosphatase 101  46 - 116 U/L Final    Total Protein 7 4  6 4 - 8 2 g/dL Final    Albumin 3 1 (*) 3 5 - 5 0 g/dL Final    Total Bilirubin 0 47  0 20 - 1 00 mg/dL Final    Comment: Use of this assay is not recommended for patients undergoing treatment with eltrombopag due to the potential for falsely elevated results      eGFR 40 ml/min/1 73sq m Final    Narrative:     National Kid[employer] Foundation guidelines for Chronic Kidney Disease (CKD):     Stage 1 with normal or high GFR (GFR > 90 mL/min/1 73 square meters)    Stage 2 Mild CKD (GFR = 60-89 mL/min/1 73 square meters)    Stage 3A Moderate CKD (GFR = 45-59 mL/min/1 73 square meters)    Stage 3B Moderate CKD (GFR = 30-44 mL/min/1 73 square meters)    Stage 4 Severe CKD (GFR = 15-29 mL/min/1 73 square meters)    Stage 5 End Stage CKD (GFR <15 mL/min/1 73 square meters)  Note: GFR calculation is accurate only with a steady state creatinine   LIPASE - Abnormal    Lipase 30 (*) 73 - 393 u/L Final   UA W REFLEX TO MICROSCOPIC WITH REFLEX TO CULTURE   PLATELET COUNT       Patient seen and admitted to the surgical service    Critical Care Time  Procedures

## 2021-03-16 PROBLEM — K56.609 SBO (SMALL BOWEL OBSTRUCTION) (HCC): Status: ACTIVE | Noted: 2021-03-16

## 2021-03-16 LAB
ANION GAP SERPL CALCULATED.3IONS-SCNC: 6 MMOL/L (ref 4–13)
BASOPHILS # BLD AUTO: 0.06 THOUSANDS/ΜL (ref 0–0.1)
BASOPHILS NFR BLD AUTO: 1 % (ref 0–1)
BUN SERPL-MCNC: 20 MG/DL (ref 5–25)
CALCIUM SERPL-MCNC: 8.2 MG/DL (ref 8.3–10.1)
CHLORIDE SERPL-SCNC: 111 MMOL/L (ref 100–108)
CO2 SERPL-SCNC: 25 MMOL/L (ref 21–32)
CREAT SERPL-MCNC: 1.05 MG/DL (ref 0.6–1.3)
EOSINOPHIL # BLD AUTO: 0.2 THOUSAND/ΜL (ref 0–0.61)
EOSINOPHIL NFR BLD AUTO: 2 % (ref 0–6)
ERYTHROCYTE [DISTWIDTH] IN BLOOD BY AUTOMATED COUNT: 16.1 % (ref 11.6–15.1)
GFR SERPL CREATININE-BSD FRML MDRD: 45 ML/MIN/1.73SQ M
GLUCOSE SERPL-MCNC: 113 MG/DL (ref 65–140)
GLUCOSE SERPL-MCNC: 141 MG/DL (ref 65–140)
GLUCOSE SERPL-MCNC: 53 MG/DL (ref 65–140)
GLUCOSE SERPL-MCNC: 62 MG/DL (ref 65–140)
HCT VFR BLD AUTO: 33.6 % (ref 34.8–46.1)
HGB BLD-MCNC: 10 G/DL (ref 11.5–15.4)
IMM GRANULOCYTES # BLD AUTO: 0.04 THOUSAND/UL (ref 0–0.2)
IMM GRANULOCYTES NFR BLD AUTO: 0 % (ref 0–2)
LYMPHOCYTES # BLD AUTO: 2.2 THOUSANDS/ΜL (ref 0.6–4.47)
LYMPHOCYTES NFR BLD AUTO: 19 % (ref 14–44)
MAGNESIUM SERPL-MCNC: 2.1 MG/DL (ref 1.6–2.6)
MCH RBC QN AUTO: 26.6 PG (ref 26.8–34.3)
MCHC RBC AUTO-ENTMCNC: 29.8 G/DL (ref 31.4–37.4)
MCV RBC AUTO: 89 FL (ref 82–98)
MONOCYTES # BLD AUTO: 1.18 THOUSAND/ΜL (ref 0.17–1.22)
MONOCYTES NFR BLD AUTO: 10 % (ref 4–12)
NEUTROPHILS # BLD AUTO: 7.73 THOUSANDS/ΜL (ref 1.85–7.62)
NEUTS SEG NFR BLD AUTO: 68 % (ref 43–75)
NRBC BLD AUTO-RTO: 0 /100 WBCS
PHOSPHATE SERPL-MCNC: 2.9 MG/DL (ref 2.3–4.1)
PLATELET # BLD AUTO: 527 THOUSANDS/UL (ref 149–390)
PMV BLD AUTO: 9.5 FL (ref 8.9–12.7)
POTASSIUM SERPL-SCNC: 3.8 MMOL/L (ref 3.5–5.3)
RBC # BLD AUTO: 3.76 MILLION/UL (ref 3.81–5.12)
SODIUM SERPL-SCNC: 142 MMOL/L (ref 136–145)
WBC # BLD AUTO: 11.41 THOUSAND/UL (ref 4.31–10.16)

## 2021-03-16 PROCEDURE — 83735 ASSAY OF MAGNESIUM: CPT | Performed by: ORTHOPAEDIC SURGERY

## 2021-03-16 PROCEDURE — 80048 BASIC METABOLIC PNL TOTAL CA: CPT | Performed by: ORTHOPAEDIC SURGERY

## 2021-03-16 PROCEDURE — 84100 ASSAY OF PHOSPHORUS: CPT | Performed by: ORTHOPAEDIC SURGERY

## 2021-03-16 PROCEDURE — 36415 COLL VENOUS BLD VENIPUNCTURE: CPT | Performed by: ORTHOPAEDIC SURGERY

## 2021-03-16 PROCEDURE — 99222 1ST HOSP IP/OBS MODERATE 55: CPT | Performed by: SURGERY

## 2021-03-16 PROCEDURE — 85025 COMPLETE CBC W/AUTO DIFF WBC: CPT | Performed by: ORTHOPAEDIC SURGERY

## 2021-03-16 PROCEDURE — 82948 REAGENT STRIP/BLOOD GLUCOSE: CPT

## 2021-03-16 RX ORDER — DEXTROSE MONOHYDRATE 25 G/50ML
50 INJECTION, SOLUTION INTRAVENOUS ONCE
Status: COMPLETED | OUTPATIENT
Start: 2021-03-16 | End: 2021-03-16

## 2021-03-16 RX ORDER — DEXTROSE MONOHYDRATE 25 G/50ML
INJECTION, SOLUTION INTRAVENOUS
Status: COMPLETED
Start: 2021-03-16 | End: 2021-03-16

## 2021-03-16 RX ADMIN — HYDROMORPHONE HYDROCHLORIDE 0.2 MG: 1 INJECTION, SOLUTION INTRAMUSCULAR; INTRAVENOUS; SUBCUTANEOUS at 20:49

## 2021-03-16 RX ADMIN — HEPARIN SODIUM 5000 UNITS: 5000 INJECTION INTRAVENOUS; SUBCUTANEOUS at 12:19

## 2021-03-16 RX ADMIN — DEXTROSE MONOHYDRATE 50 ML: 25 INJECTION, SOLUTION INTRAVENOUS at 08:22

## 2021-03-16 RX ADMIN — SODIUM CHLORIDE, SODIUM LACTATE, POTASSIUM CHLORIDE, AND CALCIUM CHLORIDE 100 ML/HR: .6; .31; .03; .02 INJECTION, SOLUTION INTRAVENOUS at 12:19

## 2021-03-16 RX ADMIN — SODIUM CHLORIDE, SODIUM LACTATE, POTASSIUM CHLORIDE, AND CALCIUM CHLORIDE 100 ML/HR: .6; .31; .03; .02 INJECTION, SOLUTION INTRAVENOUS at 02:41

## 2021-03-16 RX ADMIN — DEXTROSE MONOHYDRATE 50 ML: 500 INJECTION PARENTERAL at 08:22

## 2021-03-16 RX ADMIN — HEPARIN SODIUM 5000 UNITS: 5000 INJECTION INTRAVENOUS; SUBCUTANEOUS at 20:50

## 2021-03-16 RX ADMIN — HEPARIN SODIUM 5000 UNITS: 5000 INJECTION INTRAVENOUS; SUBCUTANEOUS at 02:56

## 2021-03-16 NOTE — QUICK NOTE
Patient is refusing NG tube placement and cannot be convinced otherwise      Bing Lino MD  Surgery, PGY-4

## 2021-03-16 NOTE — ED NOTES
Surgical resident at bedside  Pt placed on 2L nasal cannula        Diane Villagomez RN  03/15/21 7058

## 2021-03-16 NOTE — PLAN OF CARE
Problem: PAIN - ADULT  Goal: Verbalizes/displays adequate comfort level or baseline comfort level  Description: Interventions:  - Encourage patient to monitor pain and request assistance  - Assess pain using appropriate pain scale  - Administer analgesics based on type and severity of pain and evaluate response  - Implement non-pharmacological measures as appropriate and evaluate response  - Consider cultural and social influences on pain and pain management  - Notify physician/advanced practitioner if interventions unsuccessful or patient reports new pain  Outcome: Progressing     Problem: INFECTION - ADULT  Goal: Absence or prevention of progression during hospitalization  Description: INTERVENTIONS:  - Assess and monitor for signs and symptoms of infection  - Monitor lab/diagnostic results  - Monitor all insertion sites, i e  indwelling lines, tubes, and drains  - Monitor endotracheal if appropriate and nasal secretions for changes in amount and color  - [place] appropriate cooling/warming therapies per order  - Administer medications as ordered  - Instruct and encourage patient and family to use good hand hygiene technique  - Identify and instruct in appropriate isolation precautions for identified infection/condition  Outcome: Progressing  Goal: Absence of fever/infection during neutropenic period  Description: INTERVENTIONS:  - Monitor WBC    Outcome: Progressing     Problem: SAFETY ADULT  Goal: Patient will remain free of falls  Description: INTERVENTIONS:  - Assess patient frequently for physical needs  -  Identify cognitive and physical deficits and behaviors that affect risk of falls    -  [place] fall precautions as indicated by assessment   - Educate patient/family on patient safety including physical limitations  - Instruct patient to call for assistance with activity based on assessment  - Modify environment to reduce risk of injury  - Consider OT/PT consult to assist with strengthening/mobility  Outcome: Progressing  Goal: Maintain or return to baseline ADL function  Description: INTERVENTIONS:  -  Assess patient's ability to carry out ADLs; assess patient's baseline for ADL function and identify physical deficits which impact ability to perform ADLs (bathing, care of mouth/teeth, toileting, grooming, dressing, etc )  - Assess/evaluate cause of self-care deficits   - Assess range of motion  - Assess patient's mobility; develop plan if impaired  - Assess patient's need for assistive devices and provide as appropriate  - Encourage maximum independence but intervene and supervise when necessary  - Involve family in performance of ADLs  - Assess for home care needs following discharge   - Consider OT consult to assist with ADL evaluation and planning for discharge  - Provide patient education as appropriate  Outcome: Progressing  Goal: Maintain or return mobility status to optimal level  Description: INTERVENTIONS:  - Assess patient's baseline mobility status (ambulation, transfers, stairs, etc )    - Identify cognitive and physical deficits and behaviors that affect mobility  - Identify mobility aids required to assist with transfers and/or ambulation (gait belt, sit-to-stand, lift, walker, cane, etc )  - [place] fall precautions as indicated by assessment  - Record patient progress and toleration of activity level on Mobility SBAR; progress patient to next Phase/Stage  - Instruct patient to call for assistance with activity based on assessment  - Consider rehabilitation consult to assist with strengthening/weightbearing, etc   Outcome: Progressing     Problem: DISCHARGE PLANNING  Goal: Discharge to home or other facility with appropriate resources  Description: INTERVENTIONS:  - Identify barriers to discharge w/patient and caregiver  - Arrange for needed discharge resources and transportation as appropriate  - Identify discharge learning needs (meds, wound care, etc )  - Arrange for interpretive services to assist at discharge as needed  - Refer to Case Management Department for coordinating discharge planning if the patient needs post-hospital services based on physician/advanced practitioner order or complex needs related to functional status, cognitive ability, or social support system  Outcome: Progressing     Problem: Knowledge Deficit  Goal: Patient/family/caregiver demonstrates understanding of disease process, treatment plan, medications, and discharge instructions  Description: Complete learning assessment and assess knowledge base    Interventions:  - Provide teaching at level of understanding  - Provide teaching via preferred learning methods  Outcome: Progressing

## 2021-03-16 NOTE — ED NOTES
Pt refusing NG tube at this time  This RN explained risk of not having tube placed  Pt verbalized understanding    Dr Den Desai made aware      Mj Callaway RN  03/16/21 0285

## 2021-03-16 NOTE — ED NOTES
Patient's blood glucose rechecked  Now 53 mg/dL  Patient is alert and talking at this time  Message sent to red surgery resident        Nito Joaquin RN  03/16/21 7229

## 2021-03-16 NOTE — QUICK NOTE
Nurse-Patient-Provider rounds were completed with the patient's nurse today, Benita Cox  We discussed the plan is to keep her NPO with aspiration precautions  Continue IV fluids for hydration while NPO  Patient declined NG tube insertion  Patient with some improvement in her abdominal pain subjectively, but denies having any bowel function as of yet  She also denies any nausea, vomiting, belching and or hiccuping  Continue management conservatively for suspected small-bowel obstruction  Gallbladder distension likely result of patient's decreased oral intake as she clinically does not have evidence of acute cholecystitis at this time  Continue to monitor abdominal exam and for return of normal bowel function  We reviewed all of the invasive devices/lines/telemetry orders   - None  DVT Prophylaxis:  - Subcutaneous heparin and SCDs  Pain Assessment / Plan:  - Continue current analgesic regimen  Mobility Assessment / Plan:  - Activity as tolerated with assistance  Goals / Barriers for discharge:  - Not yet appropriate for discharge while awaiting return of bowel function and subsequent toleration of oral diet  Case management following; case and discharge needs discussed  I spoke with the patient's daughter, Sha Wharton, via the phone during rounds well in the patient's room to provide her an update, address her questions and concerns and explain the anticipated hospital course  All questions and concerns were addressed  I spent greater than 23 minutes reviewing the plan with the patient and the nurse, and coordinating care for the day      Cristobal Gunn PA-C  3/16/2021 12:15 PM

## 2021-03-16 NOTE — H&P
Consultation - General Surgery   Richard Piper 80 y o  female MRN: [id number]  Unit/Bed#Gopi Cooper Encounter: [id number]    Assessment/Plan   Assessment:  20-year-old female with hypertension, diabetes, hyperlipidemia, history of hysterectomy, who presents with small-bowel obstruction  Plan:  NPO  NG tube  IV fluids  PPx: SQH    History of Present Illness   HPI:  Richard Piper is a 80 y o  female with HTN, DM, HLD, hysterectomy who presents with abdominal pain, nausea, and emesis  Pain started earlier this morning after having a couple "very large" bowel movements  Mostly right abdomen  She endorsed associated nausea and vomited once  She denied any fevers or chills  She denied chest pain, burning or pain with urination, or increased urinary frequency  CT A/P in the ED revealed an distended small bowel with       Consults    Review of Systems  10/14 systems reviewed and negative except those mentioned in the HPI  Historical Information   Past Medical History:   Diagnosis Date    Basal cell carcinoma     Diabetes mellitus ([facility] [address] )     [place] (hard of hearing[place] 03/15/2021    Hypertension     Migraine     Small bowel obstruction ([facility] [address] ) 03/15/2021     Past Surgical History:   Procedure Laterality Date    HIP SURGERY      ORIF Left Hip     Social History   Social History     Substance and Sexual Activity   Alcohol Use Never    Frequency: Never    Binge frequency: Never     Social History     Substance and Sexual Activity   Drug Use No     E-Cigarette/Vaping    E-Cigarette Use Never User      E-Cigarette/Vaping Substances     Social History     Tobacco Use   Smoking Status Never Smoker   Smokeless Tobacco Never Used     Family History: History reviewed  No pertinent family history  Meds/Allergies   current meds:   No current facility-administered medications for this encounter  and PTA meds:   Prior to Admission Medications   Prescriptions Last Dose Informant Patient Reported? Taking?    Docusate Sodium 100 MG/5ML ENEM   Yes No   Sig: Take 100 mg by mouth   ILEVRO 0 3 % SUSP   Yes No   Sig: USE 1 DROP IN RIGHT EYE STARTING THE DAY OF SURGERY WHEN YOU GET HOME FOR 28 DAYS   SITagliptin Phosphate (JANUVIA PO)  Self Yes No   Sig: Take by mouth daily Pt unsure dose    Timolol Maleate (TIMOPTIC OP)   Yes No   Sig: Administer 1 drop into the left eye daily at bedtime     acetaminophen (TYLENOL) 325 mg tablet   No No   Sig: Take 2 tablets (650 mg total) by mouth every 6 (six) hours as needed for mild pain   amLODIPine (NORVASC) 10 mg tablet   No No   Sig: Take 1 tablet (10 mg total) by mouth daily   carvedilol (COREG) 25 mg tablet   No No   Sig: Take 1 tablet (25 mg total) by mouth 2 (two) times a day with meals   cloNIDine (CATAPRES-TTS-1) 0 1 mg/24 hr   No No   Sig: Place 1 patch (0 1 mg total) on the skin once a week   famotidine (PEPCID) 20 mg tablet   Yes No   Sig: Take 20 mg by mouth 2 (two) times a day   glimepiride (AMARYL) 1 mg tablet   No No   Sig: Take 1 tablet (1 mg total) by mouth daily with breakfast   lisinopril (ZESTRIL) 10 mg tablet   Yes No   Sig: Take 10 mg by mouth    meclizine (ANTIVERT) 12 5 MG tablet   Yes No   Sig: Take 12 5 mg by mouth 2 (two) times a day   naproxen (NAPROSYN) 500 mg tablet   No No   Sig: Take 1 tablet (500 mg total) by mouth every 12 (twelve) hours as needed for mild pain or moderate pain   pravastatin (PRAVACHOL) 10 mg tablet   Yes No   Sig: Take by mouth daily   prednisoLONE acetate (PRED FORTE) 1 % ophthalmic suspension   Yes No   Sig: Administer 1 % into the left eye daily     senna-docusate sodium (SENOKOT S) 8 6-50 mg per tablet   No No   Sig: Take 1 tablet by mouth daily at bedtime      Facility-Administered Medications: None     Allergies   Allergen Reactions    Atorvastatin Hives    Fentanyl Other (See Comments)    Hydrocodone-Acetaminophen GI Intolerance    Metronidazole Other (See Comments)    Penicillins Other (See Comments)    Sulfa Antibiotics Other (See Comments)     takes Amaryl @ home,takes Hyzaar @ home    Tramadol Other (See Comments)    Metoclopramide Rash    Oxycodone-Acetaminophen Rash       Objective   First Vitals:   Blood Pressure: (!) 225/94 (03/15/21 1922)  Pulse: 74 (03/15/21 1918)  Temperature: (!) 97 3 °F (36 3 °C) (03/15/21 1918)  Temp Source: Oral (03/15/21 1918)  Respirations: 20 (03/15/21 1918)  Height: 5' 5" (165 1 cm) (03/15/21 1918)  Weight - Scale: 61 2 kg (135 lb) (03/15/21 1918)  SpO2: 94 % (03/15/21 1918)    Current Vitals:   Blood Pressure: (!) 171/75 (03/15/21 2149)  Pulse: 74 (03/15/21 2149)  Temperature: (!) 97 3 °F (36 3 °C) (03/15/21 1918)  Temp Source: Oral (03/15/21 1918)  Respirations: 18 (03/15/21 2149)  Height: 5' 5" (165 1 cm) (03/15/21 1918)  Weight - Scale: 61 2 kg (135 lb) (03/15/21 1918)  SpO2: 95 % (03/15/21 2149)    No intake or output data in the 24 hours ending 03/15/21 2236    Invasive Devices     Peripheral Intravenous Line            Peripheral IV 03/15/21 Right Antecubital less than 1 day                Physical Exam  Vitals signs and nursing note reviewed  Constitutional:       General: She is not in acute distress  Appearance: She is well-developed  She is not diaphoretic  HENT:      Head: Normocephalic and atraumatic  Right Ear: External ear normal       Left Ear: External ear normal       Nose: Nose normal       Mouth/Throat:      Mouth: Mucous membranes are moist       Pharynx: Oropharynx is clear  Eyes:      General: No scleral icterus  Comments: Left eye cataracts   Neck:      Musculoskeletal: Normal range of motion and neck supple  Vascular: No JVD  Trachea: No tracheal deviation  Cardiovascular:      Rate and Rhythm: Normal rate and regular rhythm  Pulses: Normal pulses  Pulmonary:      Effort: Pulmonary effort is normal  No respiratory distress  Chest:      Chest wall: No tenderness  Abdominal:      General: There is distension  Palpations: Abdomen is soft  Tenderness: There is no abdominal tenderness  There is no guarding or rebound  Comments: tympanic   Musculoskeletal: Normal range of motion  General: No tenderness or deformity  Skin:     General: Skin is warm and dry  Neurological:      Mental Status: She is alert and oriented to person, place, and time  Cranial Nerves: No cranial nerve deficit  Psychiatric:         Mood and Affect: Mood normal          Behavior: Behavior normal          Thought Content: Thought content normal          Judgment: Judgment normal          Lab Results:   CBC:   Lab Results   Component Value Date    WBC 13 73 (H) 03/15/2021    HGB 11 6 03/15/2021    HCT 39 6 03/15/2021    MCV 89 03/15/2021     (H) 03/15/2021    MCH 26 0 (L) 03/15/2021    MCHC 29 3 (L) 03/15/2021    RDW 16 1 (H) 03/15/2021    MPV 9 6 03/15/2021    NRBC 0 03/15/2021   , CMP:   Lab Results   Component Value Date    SODIUM 141 03/15/2021    K 3 5 03/15/2021     03/15/2021    CO2 30 03/15/2021    BUN 23 03/15/2021    CREATININE 1 16 03/15/2021    CALCIUM 9 3 03/15/2021    AST 13 03/15/2021    ALT 11 (L) 03/15/2021    ALKPHOS 101 03/15/2021    EGFR 40 03/15/2021   , Coagulation: No results found for: PT, INR, APTT, Urinalysis: No results found for: Bearl Heman, SPECGRAV, PHUR, LEUKOCYTESUR, NITRITE, PROTEINUA, GLUCOSEU, KETONESU, BILIRUBINUR, BLOODU, Amylase: No results found for: AMYLASE, Lipase:   Lab Results   Component Value Date    LIPASE 30 (L) 03/15/2021     Imaging:   Procedure: Ct Abdomen Pelvis With Contrast    Result Date: 3/15/2021  Narrative: CT ABDOMEN AND PELVIS WITH IV CONTRAST INDICATION:   abd pain  COMPARISON:  December 11, 2020 TECHNIQUE:  CT examination of the abdomen and pelvis was performed  Axial, sagittal, and coronal 2D reformatted images were created from the source data and submitted for interpretation  Radiation dose length product (DLP) for this visit:  402 26 mGy-cm     This examination, like all CT scans performed in the [facility], was performed utilizing techniques to minimize radiation dose exposure, including the use of iterative  reconstruction and automated exposure control  IV Contrast:  100 mL of iohexol (OMNIPAQUE) Enteric Contrast:  Enteric contrast was not administered  FINDINGS: ABDOMEN LOWER CHEST:  Bilateral pleural effusions (right greater than left) or visualized  Atelectasis versus early consolidation in the left lower lobe is seen  LIVER/BILIARY TREE:  Intrahepatic and extrahepatic biliary ductal dilatation is seen  GALLBLADDER:  Markedly distended gallbladder with numerous gallstones and sludge  SPLEEN:  Unremarkable  PANCREAS:  Atrophic pancreas with diffuse dystrophic calcifications likely representing chronic pancreatitis  Mildly enlarged irregular main pancreatic duct similar to prior study  ADRENAL GLANDS:  Unremarkable  KIDNEYS/URETERS:  Small cortical defects in the kidney similar to prior study  No evidence of hydronephrosis STOMACH AND BOWEL:  Colonic diverticulosis with minimal inflammatory changes around the sigmoid colon which may represent acute diverticulitis  No drainable fluid collection  Fluid-filled loops of small bowel with minimal surrounding inflammatory changes  The fluid-filled loops of small bowel measuring up to 3 cm  No definite transition point is visualized  APPENDIX:  No findings to suggest appendicitis  ABDOMINOPELVIC CAVITY:  Small amount of inflammatory changes around the loops of small bowel  VESSELS:  Unremarkable for patient's age  PELVIS REPRODUCTIVE ORGANS:  Unremarkable for patient's age  URINARY BLADDER:  Unremarkable  ABDOMINAL WALL/INGUINAL REGIONS:  Unremarkable  OSSEOUS STRUCTURES:  Status post left hip arthroplasty  Degenerative changes in the spine are visualized  Flowing syndesmophytes in the lower thoracic and lumbar spine    Chronic fracture of bridging L1-2 anterior osteophyte     Impression: Fluid-filled loops of small bowel measuring up to 3 cm with surrounding inflammatory changes  No definite transition point  Findings may represent early small bowel obstruction versus ileus  Recommend small bowel follow-through for further evaluation  Colonic diverticulosis with minimal inflammatory changes around the sigmoid colon which may represent acute diverticulitis  No drainable fluid collection  If not already performed recently, colonoscopy after the acute illness is recommended to rule out possibility of colon cancer mimicking diverticulitis  Markedly distended gallbladder similar to prior study  If there is concern for acute cholecystitis ultrasound should be obtained  I personally discussed this study with Juanna Crigler on 3/15/2021 at 8:59 PM  Workstation performed: [id number] ]    EKG, Pathology, and Other Studies: I have personally reviewed pertinent reports

## 2021-03-16 NOTE — CASE MANAGEMENT
LOS 1 Day  Not a Bundle  Not a Readmission  Unplanned Readmission Risk is 11 GREEN    CM spoke with pt's dtr-Cailin regarding role of CM and d/c planning  Pt lives with dtr-CM updated address in demographics  Pt lives in a 2sh, [address]ste to [address], [address] to second floor  Pt uses a cane and requires min assist with ADLs  Dtr drives pt to appointments  Hx of VNA with [facility] and [facility]  Hx of STR at   PCP is Dr Korey Dangelo  Pt uses [facility] on [address] in [place]  Primary Contact: Valente Sky (dtr) [phone]  Dtr and pt have POA/LW paperwork, but have not completed it  dtr to transport at d/c    CM reviewed d/c planning process including the following: identifying help at home, patient preference for d/c planning needs, Discharge Lounge, [facility] Meds to Bed program, availability of treatment team to discuss questions or concerns patient and/or family may have regarding understanding medications and recognizing signs and symptoms once discharged  CM also encouraged patient to follow up with all recommended appointments after discharge  Patient advised of importance for patient and family to participate in managing patients medical well being

## 2021-03-16 NOTE — ED PROVIDER NOTES
History  Chief Complaint   Patient presents with    Abdominal Pain     Pt c/o midline abd pain and nausea since this morning  States she had 2 episodes of diarrhea prior to pain  51-year-old female presents with abdominal pain  Patient says the abdominal pain began today  She had 2 bowel movements this morning that were normal in consistency  No blood in her stool  Patient says that she has felt nauseous and had a crampy pain in the center of her abdomen all day  Patient had 1 episode of vomiting in the ambulance on the way over  She has not eaten or drank anything today due to the pain and nausea  She has not had any fevers or chills  Patient has never had a pain like this before  She says that she felt normal last night when she went to bed  She has not tried any medications for her symptoms  No one else at home is sick  Prior to Admission Medications   Prescriptions Last Dose Informant Patient Reported?  Taking?   acetaminophen (TYLENOL) 325 mg tablet   No No   Sig: Take 2 tablets (650 mg total) by mouth every 6 (six) hours as needed for mild pain   famotidine (PEPCID) 20 mg tablet   Yes No   Sig: Take 1 tablet by mouth 2 (two) times a day   glimepiride (AMARYL) 1 mg tablet   No No   Sig: Take 1 tablet (1 mg total) by mouth daily with breakfast   lisinopril (ZESTRIL) 10 mg tablet   Yes No   Sig: Take 1 tablet by mouth daily   meclizine (ANTIVERT) 12 5 MG tablet   Yes No   Sig: Take 12 5 mg by mouth 2 (two) times a day   naproxen (NAPROSYN) 500 mg tablet   No No   Sig: Take 1 tablet (500 mg total) by mouth every 12 (twelve) hours as needed for mild pain or moderate pain   pravastatin (PRAVACHOL) 10 mg tablet   Yes No   Sig: Take 1 tablet by mouth daily   prednisoLONE acetate (PRED FORTE) 1 % ophthalmic suspension   Yes No   Sig: Administer 1 drop into the left eye daily   senna-docusate sodium (SENOKOT S) 8 6-50 mg per tablet   No No   Sig: Take 1 tablet by mouth daily at bedtime Facility-Administered Medications: None       Past Medical History:   Diagnosis Date    Basal cell carcinoma     Diabetes mellitus ([facility] )     [place] (hard of hearing) 03/15/2021    Hypertension     Migraine     Pneumonia due to infectious organism 11/12/2018    Small bowel obstruction ([facility] ) 03/15/2021       Past Surgical History:   Procedure Laterality Date    HIP SURGERY      ORIF Left Hip       Family History   Problem Relation Age of Onset    Heart disease Brother     Arthritis Brother     Diabetes Son     Arthritis Son      I have reviewed and agree with the history as documented  E-Cigarette/Vaping    E-Cigarette Use Never User      E-Cigarette/Vaping Substances     Social History     Tobacco Use    Smoking status: Never Smoker    Smokeless tobacco: Never Used   Substance Use Topics    Alcohol use: Never     Frequency: Never     Binge frequency: Never    Drug use: No        Review of Systems   Constitutional: Negative for chills, fatigue and fever  HENT: Negative for congestion, rhinorrhea and sore throat  Eyes: Negative for pain and redness  Respiratory: Negative for cough, chest tightness, shortness of breath and wheezing  Cardiovascular: Negative for chest pain and palpitations  Gastrointestinal: Positive for abdominal pain, nausea and vomiting  Negative for blood in stool and diarrhea  Endocrine: Negative  Genitourinary: Negative for difficulty urinating and hematuria  Musculoskeletal: Negative for back pain and myalgias  Skin: Negative for pallor and rash  Allergic/Immunologic: Negative  Neurological: Negative for dizziness, weakness, light-headedness and headaches  Hematological: Negative          Physical Exam  ED Triage Vitals   Temperature Pulse Respirations Blood Pressure SpO2   03/15/21 1918 03/15/21 1918 03/15/21 1918 03/15/21 1922 03/15/21 1918   (!) 97 3 °F (36 3 °C) 74 20 (!) 225/94 94 %      Temp Source Heart Rate Source Patient Position - Orthostatic VS BP Location FiO2 (%)   03/15/21 1918 03/15/21 1918 03/15/21 1918 03/15/21 1918 --   Oral Monitor Sitting Right arm       Pain Score       03/15/21 1918       9             Orthostatic Vital Signs  Vitals:    03/18/21 1143 03/18/21 1522 03/18/21 2105 03/19/21 0840   BP: 160/65 156/69 162/71 161/65   Pulse: 74 68 77 72   Patient Position - Orthostatic VS:    Lying       Physical Exam  Vitals signs and nursing note reviewed  Constitutional:       General: She is not in acute distress  Appearance: Normal appearance  She is well-developed  She is ill-appearing  HENT:      Head: Normocephalic and atraumatic  Eyes:      Conjunctiva/sclera: Conjunctivae normal    Neck:      Musculoskeletal: Normal range of motion and neck supple  Cardiovascular:      Rate and Rhythm: Normal rate and regular rhythm  Heart sounds: No murmur  Pulmonary:      Effort: Pulmonary effort is normal  No respiratory distress  Breath sounds: Normal breath sounds  No wheezing, rhonchi or rales  Abdominal:      General: Abdomen is flat  There is no distension  Palpations: Abdomen is soft  Tenderness: There is abdominal tenderness in the right lower quadrant and periumbilical area  There is no guarding or rebound  Musculoskeletal: Normal range of motion  Skin:     General: Skin is warm and dry  Neurological:      General: No focal deficit present  Mental Status: She is alert and oriented to person, place, and time           ED Medications  Medications   potassium chloride 20 mEq IVPB (premix) (20 mEq Intravenous Not Given 3/16/21 0523)   sodium chloride 0 9 % bolus 1,000 mL (0 mL Intravenous Stopped 3/15/21 2300)   ondansetron (ZOFRAN) injection 4 mg (4 mg Intravenous Given 3/15/21 1949)   acetaminophen (TYLENOL) tablet 975 mg (975 mg Oral Given 3/15/21 2021)   iohexol (OMNIPAQUE) 350 MG/ML injection (MULTI-DOSE) 100 mL (100 mL Intravenous Given 3/15/21 2041)   ondansetron (ZOFRAN) injection 4 mg (4 mg Intravenous Given 3/15/21 2147)   famotidine (PEPCID) injection 20 mg (20 mg Intravenous Given 3/15/21 2149)   HYDROmorphone (DILAUDID) injection 0 2 mg (0 2 mg Intravenous Given 3/15/21 2146)   dextrose 50 % IV solution 50 mL (50 mL Intravenous Given 3/16/21 0822)   potassium chloride 20 mEq IVPB (premix) (20 mEq Intravenous New Bag 3/17/21 1706)       Diagnostic Studies  Results Reviewed     Procedure Component Value Units Date/Time    COVID19, Influenza A/B, RSV PCR, [facility] [[id number]]  (Normal) Collected: 03/19/21 1128    Lab Status: Final result Specimen: Nares from Nose Updated: 03/19/21 1239     SARS-CoV-2 Negative     INFLUENZA A PCR Negative     INFLUENZA B PCR Negative     RSV PCR Negative    Narrative: This test has been authorized by FDA under an EUA (Emergency Use Assay) for use by authorized laboratories  Clinical caution and judgement should be used with the interpretation of these results with consideration of the clinical impression and other laboratory testing  Testing reported as "Positive" or "Negative" has been proven to be accurate according to standard laboratory validation requirements  All testing is performed with control materials showing appropriate reactivity at standard intervals      Basic metabolic panel [[id number]]  (Abnormal) Collected: 03/18/21 0527    Lab Status: Final result Specimen: Blood from Arm, Left Updated: 03/18/21 0717     Sodium 140 mmol/L      Potassium 4 2 mmol/L      Chloride 109 mmol/L      CO2 28 mmol/L      ANION GAP 3 mmol/L      BUN 13 mg/dL      Creatinine 0 94 mg/dL      Glucose 64 mg/dL      Calcium 8 9 mg/dL      eGFR 51 ml/min/1 73sq m     Narrative:      True guidelines for Chronic Kidney Disease (CKD):     Stage 1 with normal or high GFR (GFR > 90 mL/min/1 73 square meters)    Stage 2 Mild CKD (GFR = 60-89 mL/min/1 73 square meters)    Stage 3A Moderate CKD (GFR = 45-59 mL/min/1 73 square meters)    Stage 3B Moderate CKD (GFR = 30-44 mL/min/1 73 square meters)    Stage 4 Severe CKD (GFR = 15-29 mL/min/1 73 square meters)    Stage 5 End Stage CKD (GFR <15 mL/min/1 73 square meters)  Note: GFR calculation is accurate only with a steady state creatinine    CBC and differential [[id number]]  (Abnormal) Collected: 03/17/21 0635    Lab Status: Final result Specimen: Blood from Arm, Left Updated: 03/17/21 0726     WBC 8 00 Thousand/uL      RBC 3 85 Million/uL      Hemoglobin 10 1 g/dL      Hematocrit 34 3 %      MCV 89 fL      MCH 26 2 pg      MCHC 29 4 g/dL      RDW 16 0 %      MPV 9 9 fL      Platelets 885 Thousands/uL      nRBC 0 /100 WBCs      Neutrophils Relative 65 %      Immat GRANS % 0 %      Lymphocytes Relative 21 %      Monocytes Relative 10 %      Eosinophils Relative 3 %      Basophils Relative 1 %      Neutrophils Absolute 5 19 Thousands/µL      Immature Grans Absolute 0 02 Thousand/uL      Lymphocytes Absolute 1 70 Thousands/µL      Monocytes Absolute 0 77 Thousand/µL      Eosinophils Absolute 0 23 Thousand/µL      Basophils Absolute 0 09 Thousands/µL     Basic metabolic panel [[id number]]  (Abnormal) Collected: 03/17/21 0635    Lab Status: Final result Specimen: Blood from Arm, Left Updated: 03/17/21 5589     Sodium 143 mmol/L      Potassium 3 6 mmol/L      Chloride 112 mmol/L      CO2 28 mmol/L      ANION GAP 3 mmol/L      BUN 14 mg/dL      Creatinine 0 93 mg/dL      Glucose 40 mg/dL      Calcium 8 7 mg/dL      eGFR 52 ml/min/1 73sq m     Narrative:      [place] guidelines for Chronic Kidney Disease (CKD):     Stage 1 with normal or high GFR (GFR > 90 mL/min/1 73 square meters)    Stage 2 Mild CKD (GFR = 60-89 mL/min/1 73 square meters)    Stage 3A Moderate CKD (GFR = 45-59 mL/min/1 73 square meters)    Stage 3B Moderate CKD (GFR = 30-44 mL/min/1 73 square meters)    Stage 4 Severe CKD (GFR = 15-29 mL/min/1 73 square meters)    Stage 5 End Stage CKD (GFR <15 mL/min/1 73 square meters)  Note: GFR calculation is accurate only with a steady state creatinine    Fingerstick Glucose (POCT) [[id number]]  (Normal) Collected: 03/16/21 1024    Lab Status: Final result Updated: 03/16/21 1027     POC Glucose 113 mg/dl     Fingerstick Glucose (POCT) [[id number]]  (Abnormal) Collected: 03/16/21 0859    Lab Status: Final result Updated: 03/16/21 0900     POC Glucose 141 mg/dl     Fingerstick Glucose (POCT) [[id number]]  (Abnormal) Collected: 03/16/21 0814    Lab Status: Final result Updated: 03/16/21 0815     POC Glucose 53 mg/dl     Basic metabolic panel [[id number]]  (Abnormal) Collected: 03/16/21 0531    Lab Status: Final result Specimen: Blood from Arm, Right Updated: 03/16/21 0619     Sodium 142 mmol/L      Potassium 3 8 mmol/L      Chloride 111 mmol/L      CO2 25 mmol/L      ANION GAP 6 mmol/L      BUN 20 mg/dL      Creatinine 1 05 mg/dL      Glucose 62 mg/dL      Calcium 8 2 mg/dL      eGFR 45 ml/min/1 73sq m     Narrative:      [place] guidelines for Chronic Kidney Disease (CKD):     Stage 1 with normal or high GFR (GFR > 90 mL/min/1 73 square meters)    Stage 2 Mild CKD (GFR = 60-89 mL/min/1 73 square meters)    Stage 3A Moderate CKD (GFR = 45-59 mL/min/1 73 square meters)    Stage 3B Moderate CKD (GFR = 30-44 mL/min/1 73 square meters)    Stage 4 Severe CKD (GFR = 15-29 mL/min/1 73 square meters)    Stage 5 End Stage CKD (GFR <15 mL/min/1 73 square meters)  Note: GFR calculation is accurate only with a steady state creatinine    Magnesium [[id number]]  (Normal) Collected: 03/16/21 0531    Lab Status: Final result Specimen: Blood from Arm, Right Updated: 03/16/21 0619     Magnesium 2 1 mg/dL     Phosphorus [[id number]]  (Normal) Collected: 03/16/21 0531    Lab Status: Final result Specimen: Blood from Arm, Right Updated: 03/16/21 0619     Phosphorus 2 9 mg/dL     CBC and differential [[id number]]  (Abnormal) Collected: 03/16/21 0531    Lab Status: Final result Specimen: Blood from Arm, Right Updated: 03/16/21 0541     WBC 11 41 Thousand/uL      RBC 3 76 Million/uL      Hemoglobin 10 0 g/dL      Hematocrit 33 6 %      MCV 89 fL      MCH 26 6 pg      MCHC 29 8 g/dL      RDW 16 1 %      MPV 9 5 fL      Platelets 403 Thousands/uL      nRBC 0 /100 WBCs      Neutrophils Relative 68 %      Immat GRANS % 0 %      Lymphocytes Relative 19 %      Monocytes Relative 10 %      Eosinophils Relative 2 %      Basophils Relative 1 %      Neutrophils Absolute 7 73 Thousands/µL      Immature Grans Absolute 0 04 Thousand/uL      Lymphocytes Absolute 2 20 Thousands/µL      Monocytes Absolute 1 18 Thousand/µL      Eosinophils Absolute 0 20 Thousand/µL      Basophils Absolute 0 06 Thousands/µL     Comprehensive metabolic panel [[id number]]  (Abnormal) Collected: 03/15/21 1949    Lab Status: Final result Specimen: Blood from Arm, Right Updated: 03/15/21 2019     Sodium 141 mmol/L      Potassium 3 5 mmol/L      Chloride 108 mmol/L      CO2 30 mmol/L      ANION GAP 3 mmol/L      BUN 23 mg/dL      Creatinine 1 16 mg/dL      Glucose 116 mg/dL      Calcium 9 3 mg/dL      Corrected Calcium 10 0 mg/dL      AST 13 U/L      ALT 11 U/L      Alkaline Phosphatase 101 U/L      Total Protein 7 4 g/dL      Albumin 3 1 g/dL      Total Bilirubin 0 47 mg/dL      eGFR 40 ml/min/1 73sq m     Narrative:      [place] guidelines for Chronic Kidney Disease (CKD):     Stage 1 with normal or high GFR (GFR > 90 mL/min/1 73 square meters)    Stage 2 Mild CKD (GFR = 60-89 mL/min/1 73 square meters)    Stage 3A Moderate CKD (GFR = 45-59 mL/min/1 73 square meters)    Stage 3B Moderate CKD (GFR = 30-44 mL/min/1 73 square meters)    Stage 4 Severe CKD (GFR = 15-29 mL/min/1 73 square meters)    Stage 5 End Stage CKD (GFR <15 mL/min/1 73 square meters)  Note: GFR calculation is accurate only with a steady state creatinine    Lipase [[id number]]  (Abnormal) Collected: 03/15/21 1949    Lab Status: Final result Specimen: Blood from Arm, Right Updated: 03/15/21 2019     Lipase 30 u/L     CBC and differential [[id number]]  (Abnormal) Collected: 03/15/21 1949    Lab Status: Final result Specimen: Blood from Arm, Right Updated: 03/15/21 2004     WBC 13 73 Thousand/uL      RBC 4 46 Million/uL      Hemoglobin 11 6 g/dL      Hematocrit 39 6 %      MCV 89 fL      MCH 26 0 pg      MCHC 29 3 g/dL      RDW 16 1 %      MPV 9 6 fL      Platelets 408 Thousands/uL      nRBC 0 /100 WBCs      Neutrophils Relative 78 %      Immat GRANS % 0 %      Lymphocytes Relative 12 %      Monocytes Relative 9 %      Eosinophils Relative 1 %      Basophils Relative 0 %      Neutrophils Absolute 10 66 Thousands/µL      Immature Grans Absolute 0 04 Thousand/uL      Lymphocytes Absolute 1 58 Thousands/µL      Monocytes Absolute 1 21 Thousand/µL      Eosinophils Absolute 0 18 Thousand/µL      Basophils Absolute 0 06 Thousands/µL                  CT abdomen pelvis with contrast   Final Result by Kristine Meeks DO (03/15 2112)      Fluid-filled loops of small bowel measuring up to 3 cm with surrounding inflammatory changes  No definite transition point  Findings may represent early small bowel obstruction versus ileus  Recommend small bowel follow-through for further evaluation  Colonic diverticulosis with minimal inflammatory changes around the sigmoid colon which may represent acute diverticulitis  No drainable fluid collection  If not already performed recently, colonoscopy after the acute illness is recommended to rule out possibility of colon cancer mimicking diverticulitis  Markedly distended gallbladder similar to prior study  If there is concern for acute cholecystitis ultrasound should be obtained               I personally discussed this study with Bill Mei on 3/15/2021 at 8:59 PM                            Workstation performed: [id number]               Procedures  Procedures      ED Course  ED Course as of Mar 24 1553 Mon Mar 15, 2021   2146 Surgery coming to evaluate                                          MDM  Number of Diagnoses or Management Options  Small bowel obstruction [facility]): established and improving  Diagnosis management comments: 49-year-old female presents with abdominal pain  Will do basic labs, lipase, urine, and CT abdomen pelvis  Will give Zofran and fluids  Amount and/or Complexity of Data Reviewed  Clinical lab tests: ordered and reviewed  Tests in the radiology section of CPT®: ordered and reviewed  Review and summarize past medical records: yes  Discuss the patient with other providers: yes    Risk of Complications, Morbidity, and/or Mortality  Presenting problems: moderate  Diagnostic procedures: moderate  Management options: moderate    Patient Progress  Patient progress: improved    Patient was found to have a SBO on CT  She was admitted to surgery for management  Disposition  Final diagnoses:   Small bowel obstruction ([address] )     Time reflects when diagnosis was documented in both MDM as applicable and the Disposition within this note     Time User Action Codes Description Comment    3/18/2021  1:34 PM Electa Lax Add [K56 609] Small bowel obstruction [facility])       ED Disposition     None      MD Documentation      Most Recent Value   [facility]ing [facility]Company and [facility])  Danilo  Name, [facility]  [facility] (Company and Unit #)  [facility]      Follow-up Information     Follow up With Specialties Details Why Contact Info Additional Information    Yocasta Collazo DO  Schedule an appointment as soon as possible for a visit in 2 week(s) Please call for appointment within the next 2 weeks to review your hospital encounter   [address] [place] [address]       Juan Antonio Mi DO Wound [address]  [phone]       [address] General Surgery Follow up No general surgical follow-up is necessary  May call with any questions or concerns and/or for an appointment as needed   [address] [facility], [address], [place], [address] [facility]          Discharge Medication List as of 3/19/2021 11:19 AM      CONTINUE these medications which have NOT CHANGED    Details   acetaminophen (TYLENOL) 325 mg tablet Take 2 tablets (650 mg total) by mouth every 6 (six) hours as needed for mild pain, Starting Tue 12/11/2018, No Print      famotidine (PEPCID) 20 mg tablet Take 20 mg by mouth 2 (two) times a day, Historical Med      glimepiride (AMARYL) 1 mg tablet Take 1 tablet (1 mg total) by mouth daily with breakfast, Starting Mon 11/12/2018, Print      lisinopril (ZESTRIL) 10 mg tablet Take 10 mg by mouth , Historical Med      meclizine (ANTIVERT) 12 5 MG tablet Take 12 5 mg by mouth 2 (two) times a day, Starting Fri 3/22/2019, Historical Med      naproxen (NAPROSYN) 500 mg tablet Take 1 tablet (500 mg total) by mouth every 12 (twelve) hours as needed for mild pain or moderate pain, Starting Sun 12/2/2018, Print      pravastatin (PRAVACHOL) 10 mg tablet Take by mouth daily, Historical Med      prednisoLONE acetate (PRED FORTE) 1 % ophthalmic suspension Administer 1 % into the left eye daily  , Starting Tue 2/10/2015, Historical Med      senna-docusate sodium (SENOKOT S) 8 6-50 mg per tablet Take 1 tablet by mouth daily at bedtime, Starting Tue 12/15/2020, Normal      amLODIPine (NORVASC) 10 mg tablet Take 1 tablet (10 mg total) by mouth daily, Starting Mon 10/14/2019, Print      carvedilol (COREG) 25 mg tablet Take 1 tablet (25 mg total) by mouth 2 (two) times a day with meals, Starting Tue 12/15/2020, Normal      cloNIDine (CATAPRES-TTS-1) 0 1 mg/24 hr Place 1 patch (0 1 mg total) on the skin once a week, Starting Fri 12/18/2020, Normal      Docusate Sodium 100 MG/5ML ENEM Take 100 mg by mouth, Historical Med      ILEVRO 0 3 % SUSP USE 1 DROP IN RIGHT EYE STARTING THE DAY OF SURGERY WHEN YOU GET HOME FOR 28 DAYS, Historical Med      SITagliptin Phosphate (JANUVIA PO) Take by mouth daily Pt unsure dose , Historical Med      Timolol Maleate (TIMOPTIC OP) Administer 1 drop into the left eye daily at bedtime  , Historical Med           Outpatient Discharge Orders   Discharge Diet     Shower Daily     Call provider for:  persistent nausea or vomiting     Call provider for:  severe uncontrolled pain     Call provider for:  extreme fatigue     Call provider for:     Discharge Condition:  Improving     Patient Aware of Diagnosis: Yes     Free of Communicable Disease:   Yes     Physical Therapy Eval And Treat     Occupational Therapy Eval and Treat     Activity:  Per Rehab Recommendations       PDMP Review       Value Time User    PDMP Reviewed  Yes 3/19/2021 10:27 AM Júnior Amezcua PA-C           ED Provider  Attending physically available and evaluated Yimi Munozkam  I managed the patient along with the ED Attending      Electronically Signed by         Afshin Pedro DO  03/18/21 3154       Afshin Pedro DO  03/24/21 0316

## 2021-03-16 NOTE — ED NOTES
Resumed care from Andria Strauss RN at this time  Patient resting comfortably in hospital bed        Puja Foote RN  03/16/21 5339

## 2021-03-17 LAB
ANION GAP SERPL CALCULATED.3IONS-SCNC: 3 MMOL/L (ref 4–13)
BASOPHILS # BLD AUTO: 0.09 THOUSANDS/ΜL (ref 0–0.1)
BASOPHILS NFR BLD AUTO: 1 % (ref 0–1)
BUN SERPL-MCNC: 14 MG/DL (ref 5–25)
CALCIUM SERPL-MCNC: 8.7 MG/DL (ref 8.3–10.1)
CHLORIDE SERPL-SCNC: 112 MMOL/L (ref 100–108)
CO2 SERPL-SCNC: 28 MMOL/L (ref 21–32)
CREAT SERPL-MCNC: 0.93 MG/DL (ref 0.6–1.3)
EOSINOPHIL # BLD AUTO: 0.23 THOUSAND/ΜL (ref 0–0.61)
EOSINOPHIL NFR BLD AUTO: 3 % (ref 0–6)
ERYTHROCYTE [DISTWIDTH] IN BLOOD BY AUTOMATED COUNT: 16 % (ref 11.6–15.1)
GFR SERPL CREATININE-BSD FRML MDRD: 52 ML/MIN/1.73SQ M
GLUCOSE SERPL-MCNC: 40 MG/DL (ref 65–140)
HCT VFR BLD AUTO: 34.3 % (ref 34.8–46.1)
HGB BLD-MCNC: 10.1 G/DL (ref 11.5–15.4)
IMM GRANULOCYTES # BLD AUTO: 0.02 THOUSAND/UL (ref 0–0.2)
IMM GRANULOCYTES NFR BLD AUTO: 0 % (ref 0–2)
LYMPHOCYTES # BLD AUTO: 1.7 THOUSANDS/ΜL (ref 0.6–4.47)
LYMPHOCYTES NFR BLD AUTO: 21 % (ref 14–44)
MCH RBC QN AUTO: 26.2 PG (ref 26.8–34.3)
MCHC RBC AUTO-ENTMCNC: 29.4 G/DL (ref 31.4–37.4)
MCV RBC AUTO: 89 FL (ref 82–98)
MONOCYTES # BLD AUTO: 0.77 THOUSAND/ΜL (ref 0.17–1.22)
MONOCYTES NFR BLD AUTO: 10 % (ref 4–12)
NEUTROPHILS # BLD AUTO: 5.19 THOUSANDS/ΜL (ref 1.85–7.62)
NEUTS SEG NFR BLD AUTO: 65 % (ref 43–75)
NRBC BLD AUTO-RTO: 0 /100 WBCS
PLATELET # BLD AUTO: 558 THOUSANDS/UL (ref 149–390)
PMV BLD AUTO: 9.9 FL (ref 8.9–12.7)
POTASSIUM SERPL-SCNC: 3.6 MMOL/L (ref 3.5–5.3)
RBC # BLD AUTO: 3.85 MILLION/UL (ref 3.81–5.12)
SODIUM SERPL-SCNC: 143 MMOL/L (ref 136–145)
WBC # BLD AUTO: 8 THOUSAND/UL (ref 4.31–10.16)

## 2021-03-17 PROCEDURE — 97163 PT EVAL HIGH COMPLEX 45 MIN: CPT

## 2021-03-17 PROCEDURE — 80048 BASIC METABOLIC PNL TOTAL CA: CPT | Performed by: SURGERY

## 2021-03-17 PROCEDURE — 85025 COMPLETE CBC W/AUTO DIFF WBC: CPT | Performed by: SURGERY

## 2021-03-17 PROCEDURE — 99231 SBSQ HOSP IP/OBS SF/LOW 25: CPT | Performed by: SURGERY

## 2021-03-17 PROCEDURE — 97167 OT EVAL HIGH COMPLEX 60 MIN: CPT

## 2021-03-17 PROCEDURE — 97535 SELF CARE MNGMENT TRAINING: CPT

## 2021-03-17 RX ORDER — DEXTROSE, SODIUM CHLORIDE, AND POTASSIUM CHLORIDE 5; .45; .15 G/100ML; G/100ML; G/100ML
100 INJECTION INTRAVENOUS CONTINUOUS
Status: DISCONTINUED | OUTPATIENT
Start: 2021-03-17 | End: 2021-03-18

## 2021-03-17 RX ORDER — LABETALOL 20 MG/4 ML (5 MG/ML) INTRAVENOUS SYRINGE
10 EVERY 4 HOURS PRN
Status: DISCONTINUED | OUTPATIENT
Start: 2021-03-17 | End: 2021-03-19 | Stop reason: HOSPADM

## 2021-03-17 RX ORDER — CARVEDILOL 25 MG/1
25 TABLET ORAL 2 TIMES DAILY WITH MEALS
Status: DISCONTINUED | OUTPATIENT
Start: 2021-03-18 | End: 2021-03-19 | Stop reason: HOSPADM

## 2021-03-17 RX ORDER — POTASSIUM CHLORIDE 14.9 MG/ML
20 INJECTION INTRAVENOUS
Status: COMPLETED | OUTPATIENT
Start: 2021-03-17 | End: 2021-03-17

## 2021-03-17 RX ORDER — AMLODIPINE BESYLATE 10 MG/1
10 TABLET ORAL DAILY
Status: DISCONTINUED | OUTPATIENT
Start: 2021-03-18 | End: 2021-03-19 | Stop reason: HOSPADM

## 2021-03-17 RX ORDER — FAMOTIDINE 20 MG/1
20 TABLET, FILM COATED ORAL DAILY
Status: DISCONTINUED | OUTPATIENT
Start: 2021-03-18 | End: 2021-03-19 | Stop reason: HOSPADM

## 2021-03-17 RX ADMIN — LABETALOL 20 MG/4 ML (5 MG/ML) INTRAVENOUS SYRINGE 10 MG: at 17:11

## 2021-03-17 RX ADMIN — HEPARIN SODIUM 5000 UNITS: 5000 INJECTION INTRAVENOUS; SUBCUTANEOUS at 23:17

## 2021-03-17 RX ADMIN — HEPARIN SODIUM 5000 UNITS: 5000 INJECTION INTRAVENOUS; SUBCUTANEOUS at 06:26

## 2021-03-17 RX ADMIN — HYDROMORPHONE HYDROCHLORIDE 0.2 MG: 1 INJECTION, SOLUTION INTRAMUSCULAR; INTRAVENOUS; SUBCUTANEOUS at 23:17

## 2021-03-17 RX ADMIN — POTASSIUM CHLORIDE 20 MEQ: 14.9 INJECTION, SOLUTION INTRAVENOUS at 15:13

## 2021-03-17 RX ADMIN — SODIUM CHLORIDE, SODIUM LACTATE, POTASSIUM CHLORIDE, AND CALCIUM CHLORIDE 100 ML/HR: .6; .31; .03; .02 INJECTION, SOLUTION INTRAVENOUS at 01:05

## 2021-03-17 RX ADMIN — SODIUM CHLORIDE, SODIUM LACTATE, POTASSIUM CHLORIDE, AND CALCIUM CHLORIDE 100 ML/HR: .6; .31; .03; .02 INJECTION, SOLUTION INTRAVENOUS at 12:13

## 2021-03-17 RX ADMIN — DEXTROSE, SODIUM CHLORIDE, AND POTASSIUM CHLORIDE 100 ML/HR: 5; .45; .15 INJECTION INTRAVENOUS at 14:38

## 2021-03-17 RX ADMIN — HEPARIN SODIUM 5000 UNITS: 5000 INJECTION INTRAVENOUS; SUBCUTANEOUS at 14:38

## 2021-03-17 RX ADMIN — POTASSIUM CHLORIDE 20 MEQ: 14.9 INJECTION, SOLUTION INTRAVENOUS at 17:06

## 2021-03-17 NOTE — CASE MANAGEMENT
Cm called pt room no response  Cm called pts daughter Rach Aguirre [phone]  Explained difference in acute and short term rehab  Germaine Lu asked for str facility list    A post acute care recommendation was made by your care team for STR  Discussed [place] of Choice with Germaine Lu pts daughter List of facilities given to cailin via emailed to [email or web address] Cailin aware the list is custom filtered for them by preference  and that [facility] post acute providers are designated      Cm department to follow up with patient and choices

## 2021-03-17 NOTE — WOUND OSTOMY CARE
Consult Note - Wound   Peggy Mcknight 80 y o  female MRN: [id number]  Unit/Bed#: -81 Encounter: [id number]      History and Present Illness:  Patient is seen for wound assessment  Patient examined in bed and is a moderate asisst to turn  Patient is incontinent  Patient follows at the outpatient wound center for her left leg wound  Patient examined with Espinoza Ruelas  Assessment Findings:   1  Partial thickness wound to left leg        sacro-buttocks and heels intact      See flowsheets for details      Skin care plans:  1-Hydraguard to bilateral sacrum, buttock and heels BID and PRN  2-Elevate heels to offload pressure  3-Ehob cushion in chair when out of bed  4-Moisturize skin daily with skin nourishing cream   5-Turn/reposition q2h or when medically stable for pressure re-distribution on skin  6-Cleanse left leg wound with saline and gauze  Place adaptic, then ABD on wound  Wrap with mary  Change every other day        Call or tigertext with any questions  Wound Care will continue to follow    Wound 02/25/21 Traumatic Leg Left (Active)   Wound Image   03/17/21 1013   Wound Description Clean;Beefy red;Bleeding;Drainage;Fragile;Pink 03/17/21 1013   [place]-wound Assessment Clean;Dry;Fragile;[place] 03/17/21 1013   Wound Length (cm) 4 cm 03/17/21 1013   Wound Width (cm) 2 cm 03/17/21 1013   Wound Surface Area (cm^2) 8 cm^2 03/17/21 1013   Drainage Amount Moderate 03/17/21 1013   Drainage Description Serosanguineous 03/17/21 1013   Non-staged Wound Description Partial thickness 03/17/21 1013   Treatments Cleansed 03/17/21 1013   Dressing Non adherent;ABD 03/17/21 1013

## 2021-03-17 NOTE — PHYSICAL THERAPY NOTE
Physical Therapy Evaluation    Patient Name: Neil Hanna    TLAQC'N Date: 3/17/2021     Problem List  Principal Problem:    SBO (small bowel obstruction) ([facility] [address] )       Past Medical History  Past Medical History:   Diagnosis Date    Basal cell carcinoma     Diabetes mellitus ([facility] )     [place] (hard of hearing) 03/15/2021    Hypertension     Migraine     Small bowel obstruction ([facility] ) 03/15/2021        Past Surgical History  Past Surgical History:   Procedure Laterality Date    HIP SURGERY      ORIF Left Hip      03/17/21 1002   PT Last Visit   PT Visit Date 03/17/21   Note Type   Note type Evaluation   Pain Assessment   Pain Assessment Tool FLACC   Pain Rating: FLACC (Rest) - Face 0   Pain Rating: FLACC (Rest) - Legs 0   Pain Rating: FLACC (Rest) - Activity 0   Pain Rating: FLACC (Rest) - Cry 0   Pain Rating: FLACC (Rest) - Consolability 0   Score: FLACC (Rest) 0   Pain Rating: FLACC (Activity) - Face 0   Pain Rating: FLACC (Activity) - Legs 0   Pain Rating: FLACC (Activity) - Activity 0   Pain Rating: FLACC (Activity) - Cry 0   Pain Rating: FLACC (Activity) - Consolability 0   Score: FLACC (Activity) 0   Home Living   Type of Home House  [facility] with 1 JENSEN and FF to 2nd floor)   Home Layout Two level   Home Equipment Cane   Additional Comments Pt is a question historian  Reports she resides alone however, daughter has been staying with her  Prior Function   Level of [place] Needs assistance with IADLs; Needs assistance with ADLs and functional mobility   Lives With Alone   Receives Help From Family   ADL Assistance Needs assistance   IADLs Needs assistance   Falls in the last 6 months 0   Restrictions/Precautions   Weight Bearing Precautions Per Order No   Other Precautions Cognitive; Chair Alarm; Bed Alarm; Fall Risk;Multiple lines;O2  (5L nc)   Cognition   Overall Cognitive Status Impaired   Arousal/Participation Cooperative   Orientation Level Oriented to person;Oriented to place; Disoriented to time   Memory Decreased recall of precautions;Decreased recall of recent events   Following Commands Follows one step commands with increased time or repetition   Comments Impulsive; required cues for safety  RLE Assessment   RLE Assessment   (at least 3+/5 based on functional mobility)   LLE Assessment   LLE Assessment   (at least 3+/5 based on functional mobility)   Bed Mobility   Sit to Supine 5  Supervision   Additional items Assist x 1; Increased time required;Verbal cues;LE management   Additional Comments Upon arrival, pt was sitting EOB  Noted small pool of blood on the floor; pt with a skin tear, notified LAMONT Barnes  Concluded session with pt supine in bed with bed alarm o   Transfers   Sit to Stand 4  Minimal assistance   Additional items Assist x 1; Increased time required;Verbal cues   Stand to Sit 5  Supervision   Additional items Assist x 1; Increased time required;Verbal cues   Toilet transfer 4  Minimal assistance   Additional items Assist x 1; Increased time required;Standard toilet;Verbal cues   Additional Comments Refused use of RW, unsteady upon standing    Ambulation/Elevation   Gait pattern Shuffling;Decreased foot clearance; Ataxia; Excessively slow; Foward flexed   Gait Assistance 3  Moderate assist   Additional items Assist x 1;Verbal cues  (2nd person for SBA and line management )   Assistive Device   (RW and HHA x 1)   Distance ~10 feet without DME (significantly unsteady); 10 feet with RW, poor RW management   Stair Management Assistance Not tested   Balance   Static Sitting Fair -   Dynamic Sitting Poor +   Static Standing Poor   Dynamic Standing Poor   Ambulatory Poor   Activity Tolerance   Activity Tolerance Patient limited by fatigue   Medical Staff Made Aware DAVIDA Marin   Nurse Made Aware Yes, LAMONT Moncada   Assessment   Prognosis Good   Problem List Decreased strength;Decreased endurance; Impaired balance;Decreased mobility; Impaired judgement;Decreased cognition;Decreased safety awareness   Assessment Pt is 80 y o  female seen for high complexity PT evaluation s/p admission to [facility] on 3/15/21 with abdominal pain, nausea, and emesis  Primary treating dx is SBO; pt refusing NG tube  Comorbidities affecting pt's physical performance at time of assessment include: DM, HTN, migraine, and hx of basal cell carcinoma  Pt is a questionable historian 2* cognition deficit  Per chart, pt resides alone in a 2SH with 1 JENSEN and FF to 2nd floor; reports her daughter has been staying with her  Pt reports she is independent with ADLs however, chart reports pt requires min A x 1  Currently, pt is at supervision level for bed mobility, min A x 1 for functional transfers, and mod A x 1 for ambulation with and without RW  Pt is impulsively and mildly unsteady  Patient's decreased mobility level and increased fall risk is secondary to deficits in cognition, safety awareness, gait deviations, gait speed, balance, and generalized weakness/deconditioning  Current clinical presentation is unstable/unpredictable seen in pt's presentation of ongoing medical management, increased reliance on assistance compared to PLOF, impaired judgement/safety awareness, and significant PMH  Pt to benefit from continued PT tx to address deficits as defined above and maximize level of functional independent mobility and consistency  From PT/mobility standpoint, recommendation at time of d/c would be STR pending progress in order to facilitate return to PLOF  Barriers to Discharge Inaccessible home environment   Goals   Patient Goals To use the bathroom   STG Expiration Date 03/27/21   Short Term Goal #1 In 1-2 weeks, the patient will complete the following 1) Perform all aspect of bed mobility independently 2) Perform functional transfer with LRAD independently  3) Ambulate >150 feet with LRAD at mod I level   4) Negotiate 12 steps with 1 handrail and supervision 5) Patient will improve dynamic standing balance from poor + to fair + in order to perform everyday activities  6) Patient will continue with ongoing rehab services for family education, DME, and D/C planning    Plan   Treatment/Interventions LE strengthening/ROM; Functional transfer training;ADL retraining; Therapeutic exercise; Endurance training;Cognitive reorientation;Patient/family training;Bed mobility;Gait training   PT Frequency   (3-5x/wk)   Recommendation   PT Discharge Recommendation Post-Acute Rehabilitation Services   Equipment Recommended [facility] Recommended Wheeled walker   PT - OK to Discharge Yes   Additional Comments to STR once medically stable   AM-PAC Basic Mobility Inpatient   Turning in Bed Without Bedrails 4   Lying on Back to Sitting on Edge of Flat Bed 4   Moving Bed to Chair 3   Standing Up From Chair 3   Walk in Room 2   Climb 3-5 Stairs 2   Basic Mobility Inpatient Raw Score 18   Basic Mobility Standardized Score 41 05       Thania García PT, DPT

## 2021-03-17 NOTE — RESTORATIVE TECHNICIAN NOTE
Restorative Specialist Mobility Note       Activity: Ambulate in room, Bathroom privileges, Chair, Dangle, Stand at bedside(Educated/encouraged pt to ambulate with assistance 3-4 x's/day  Bed alarm on   Pt callbell, phone/tray within reach )     Assistive Device: Other (Comment)(Assist x1)       Treasure HARRISON, Restorative Technician, [employer] [facility]

## 2021-03-17 NOTE — OCCUPATIONAL THERAPY NOTE
Occupational Therapy Evaluation + Treatment Note     Patient Name: Jeremi Osborne  JPGTT'Y Date: 3/17/2021  Problem List  Principal Problem:    SBO (small bowel obstruction) [facility])    Past Medical History  Past Medical History:   Diagnosis Date    Basal cell carcinoma     Diabetes mellitus ([facility] [address] )     [place]hard of hearing[place] 03/15/2021    Hypertension     Migraine     Small bowel obstruction ([facility] [address] ) 03/15/2021     Past Surgical History  Past Surgical History:   Procedure Laterality Date    HIP SURGERY      ORIF Left Hip          03/17/21 1020   OT Last Visit   OT Visit Date 03/17/21   Note Type   Note type Evaluation   Restrictions/Precautions   Weight Bearing Precautions Per Order No   Other Precautions Cognitive; Chair Alarm; Bed Alarm;O2;Fall Risk;Impulsive;Multiple lines;Hard of hearing  (5L O2; NPO; Aspiration Precautions)   Pain Assessment   Pain Assessment Tool FLACC   Pain Rating: FLACC (Rest) - Face 0   Pain Rating: FLACC (Rest) - Legs 0   Pain Rating: FLACC (Rest) - Activity 0   Pain Rating: FLACC (Rest) - Cry 0   Pain Rating: FLACC (Rest) - Consolability 0   Score: FLACC (Rest) 0   Pain Rating: FLACC (Activity) - Face 0   Pain Rating: FLACC (Activity) - Legs 0   Pain Rating: FLACC (Activity) - Activity 0   Pain Rating: FLACC (Activity) - Cry 0   Pain Rating: FLACC (Activity) - Consolability 0   Score: FLACC (Activity) 0   Home Living   Type of Home House   Home Layout Two level;Bed/bath upstairs;Stairs to enter without rails  (1STE, 12 steps to 2nd floor)   Bathroom Shower/Tub Tub/shower unit  (2nd floor)   Bathroom Toilet Standard   Bathroom Equipment   (pt unsure)   Home Equipment Cane  (used PTA)   Additional Comments Patient questionable historian due to cognition and hard of hearing  Unclear if patient has full or 1/2 bath on first floor, or none  Patient reports she lives alone however recently her daughter has been staying with her for a few weeks   When prompting patient "Are you able to get yourself washed, dressed and to the toilet" she states "yes" however when I asked what her daughter helps with, she says "everything" even when asked if she helps her go to/from bathroom  Patient unreliable historian  Prior Function   Level of [place] Needs assistance with ADLs and functional mobility; Needs assistance with IADLs   Lives With Alone   Receives Help From Family  (reports daughter has been staying with her for a few weeks)   ADL Assistance Needs assistance   IADLs Needs assistance   Falls in the last 6 months 0   Vocational Retired   Comments Pt questionable historian re: PLOF and home setup due to dec cognition  Psychosocial   Psychosocial (WDL) WDL   ADL   Eating Assistance 1  Total Assistance  (NPO)   Grooming Assistance 5  Supervision/Setup   UB Bathing Assistance 4  Minimal Assistance   LB Bathing Assistance 3  Moderate Assistance   UB Dressing Assistance 4  Minimal Assistance   LB Dressing Assistance 3  Moderate Assistance   Toileting Assistance  3  Moderate Assistance   Bed Mobility   Sit to Supine 5  Supervision   Additional items Assist x 1; Increased time required;Verbal cues   Additional Comments Upon arrival, pt was sitting EOB  Noted small pool of blood on the floor; pt with a skin tear, notified RN Fiona Hernandez  Concluded session with pt supine in bed with bed alarm o   Transfers   Sit to Stand 4  Minimal assistance   Additional items Assist x 1; Increased time required;Verbal cues   Stand to Sit 5  Supervision   Additional items Assist x 1; Increased time required;Verbal cues   Stand pivot 4  Minimal assistance   Additional items Assist x 1; Increased time required;Verbal cues   Toilet transfer 4  Minimal assistance   Additional items Assist x 1; Increased time required;Verbal cues;Standard toilet  (w/ rails)   Additional Comments initially attempting to have patient use RW however refusing to use it   patient unsteady without AD attempting w/ furniture grabbing     Functional Mobility Functional Mobility 3  Moderate assistance   Additional Comments Ax1 initially however with fatigue required a 2nd person for SBA for safety and line mgmt  patient using no AD to ambulate into bathroom slow unsteady gait  coming back from bathroom to lay down in bed, patient used RW and poor mgmt noted  Additional items   (RW vs none)   Balance   Static Sitting Fair -   Dynamic Sitting Poor +   Static Standing Poor   Dynamic Standing Poor   Ambulatory Poor   Activity Tolerance   Activity Tolerance Patient limited by fatigue;Treatment limited secondary to medical complications (Comment)  (cognition)   Medical Staff Made Aware PT Sherrie Cruz, RN    Nurse Made Aware yes   RUE Assessment   RUE Assessment WFL   LUE Assessment   LUE Assessment WFL   Hand Function   Gross Motor Coordination Functional   Fine Motor Coordination Functional   Sensation   Light Touch No apparent deficits   Vision - Complex Assessment   Additional Comments L eye cloudy lens   Cognition   Overall Cognitive Status Impaired   Arousal/Participation Alert; Responsive   Attention Attends with cues to redirect   Orientation Level Oriented to person;Oriented to place; Disoriented to time   Memory Decreased recall of precautions;Decreased recall of recent events;Decreased short term memory   Following Commands Follows one step commands with increased time or repetition   Comments impulsive, required cues for safety, sequencing, activity pacing and initiation of tasks  hard of hearing   Assessment   Limitation Decreased ADL status; Decreased UE strength;Decreased Safe judgement during ADL;Decreased cognition;Decreased endurance;Decreased self-care trans;Decreased high-level ADLs   Prognosis Fair;Guarded   Assessment Pt is a 80 y o  female seen for OT evaluation s/p admit to [facility] on 3/15/2021 w/ SBO (small bowel obstruction) ([facility] [address] )  Presented with abdominal pain, nausea, and emesis   Comorbidities affecting pt's functional performance at time of assessment include: type 2 diabetes, hypertension, pneumonia, meningioma, hypertensive urgency, generalized abominal pain, acute cystitis (please see extensive list of comorbidities)  Personal factors affecting pt at time of IE include:steps to enter environment, limited home support, difficulty performing ADLS, difficulty performing IADLS , level of education, limited insight into deficits, flat affect, decreased initiation and engagement , health management  and environment  Prior to admission, pt was requiring assist w/ ADLS, IADLS and functional transfers/mobility with use of SPC  Patient states that she was living alone however her daughter has been helping out and staying with her the last few weeks  Patient is a questionable historian due to cognitive deficits and hard of hearing  Upon evaluation: Patient completing UB ADLs with min A, LB ADLs with mod A and functional transfers with mod A x1 without AD vs RW  Incontinent bowel/bladder  Patient poor safety awareness and insight into deficits and recommend patient be alarmed at all times while in the hospital to decrease risk of falls/injury  Pt presents with the following deficits impacting occupational performance: weakness, decreased ROM, decreased strength, decreased balance, decreased tolerance, impaired attention, impaired initiation, impaired memory, impaired sequencing, impaired problem solving, impulsivity, decreased safety awareness, impaired interpersonal skills and decreased coping skills  Pt to benefit from continued skilled OT tx while in the hospital to address deficits as defined above and maximize level of functional independence w ADL's and functional mobility  Occupational Performance areas to address include: eating, grooming, bathing/shower, toilet hygiene, dressing, socialization, health maintenance, functional mobility, community mobility, clothing management and social participation   The patient's raw score on the AM-PAC Daily Activity inpatient short form is 16, standardized score is 35 96, less than 39 4  Patients at this level are likely to benefit from discharge to post-acute rehabilitation services  Please refer to the recommendation of the Occupational Therapist for safe discharge planning  Goals   Patient Goals use the bathroom   Long Term Goal see below   Plan   Treatment Interventions ADL retraining;Functional transfer training;UE strengthening/ROM; Endurance training;Equipment evaluation/education;Patient/family training;Cognitive reorientation; Fine motor coordination activities; Compensatory technique education; Energy conservation; Activityengagement   Goal Expiration Date 03/31/21   OT Treatment Day 1   OT Frequency 3-5x/wk   Additional Treatment Session   Start Time 1005   End Time 1020   Treatment Assessment patient requesting to use the bathroom  completed functional mobility w/o AD to bathroom w/ mod A x1  noteable fatigue and poor posture and safety awareness noted  cues for safety  patient completed toilet transfers w/ grab bars with min A  increased time to urinate  patient soiled pull up briefs and OT extensively having to educate patient on changing pants and briefs due to incontinence  patient stating "it's fine"  after a few mins patient agreeable to donning new pants and underwear  required mod A for tasks seated on toilet  CGA to CS seated to complete luis angel hygiene  cues for sequencing of tasks  OT assisted doffing brief and pants and donning brief and pants around feet and up to lower legs  min A in standing for balance to hike pants and brief over hips  poor O2 line awareness and OT assisted for safety  ,   Recommendation   OT Discharge Recommendation Post-Acute Rehabilitation Services   OT - OK to Discharge Yes  (to rehab, no to home)   AM-PAC Daily Activity Inpatient   Lower Body Dressing 2   Bathing 2   Toileting 2   Upper Body Dressing 3   Grooming 3   Eating 4  (NPO)   Daily Activity Raw Score 16   Daily Activity Standardized Score (Calc for Raw Score >=11) 35 96   AM-PAC Applied Cognition Inpatient   Following a Speech/Presentation 3   Understanding Ordinary Conversation 3   Taking Medications 2   Remembering Where Things Are Placed or Put Away 1   Remembering List of 4-5 Errands 1   Taking Care of Complicated Tasks 1   Applied Cognition Raw Score 11   Applied Cognition Standardized Score 27 03   Barthel Index   Feeding 0  (NPO)   Bathing 0   Grooming Score 5   Dressing Score 5   Bladder Score 0   Bowels Score 10   Toilet Use Score 5   Transfers (Bed/Chair) Score 10   Mobility (Level Surface) Score 0   Stairs Score 0   Barthel Index Score 35       Occupational Therapy Goals to be met by time of d/c:    1) Pt will improve activity tolerance to G for min 30 min txment sessions  2) Pt will complete all UB and LB ADLs with supervision  3) Pt will complete toileting w/ supervision w/ G hygiene/thoroughness using DME PRN  4) Pt will improve functional transfers on/off all surfaces using DME PRN w/ G balance/safety including toileting w/ supervision  5) Pt will improve fx'l mobility during I/ADl/leisure tasks using DME PRN w/ g balance/safety w/ supervision  6) Pt will engage in ongoing cognitive assessment w/ G participation to A w/ safe d/c planning/recommendations  7) Pt will demonstrate G carryover of pt/caregiver education and training as appropriate w/ mod I  w/ G tolerance  8) Pt will engage in depression screen/leisure interest checklist w/ G participation to monitor s/s depression and ID 3 positive coping strategies to A w/ emotional regulation and management  9) Pt will demonstrate 100% carryover of E C  techniques w/ mod I t/o fx'l I/ADL/leisure tasks w/o cues s/p skilled education  10) Pt will demonstrate improved b/l UE strength by 1 MMT grade to enhance ADLS and functional transfers      Britt Mccord, MS, OTR/L

## 2021-03-17 NOTE — PROGRESS NOTES
No code status on file since admission  Notified primary team (general surgery team) several times today to update  Last night, night LAMONT Bocanegra) also asked them to update code status

## 2021-03-17 NOTE — PLAN OF CARE
Problem: OCCUPATIONAL THERAPY ADULT  Goal: Performs self-care activities at highest level of function for planned discharge setting  See evaluation for individualized goals  Description: Treatment Interventions: ADL retraining, Functional transfer training, UE strengthening/ROM, Endurance training, Equipment evaluation/education, Patient/family training, Cognitive reorientation, Fine motor coordination activities, Compensatory technique education, Energy conservation, Activityengagement          See flowsheet documentation for full assessment, interventions and recommendations     Note: Limitation: Decreased ADL status, Decreased UE strength, Decreased Safe judgement during ADL, Decreased cognition, Decreased endurance, Decreased self-care trans, Decreased high-level ADLs  Prognosis: Fair, Guarded  Assessment: see note     OT Discharge Recommendation: Post-Acute Rehabilitation Services  OT - OK to Discharge: Yes(to rehab, no to home)

## 2021-03-17 NOTE — PLAN OF CARE
Problem: PHYSICAL THERAPY ADULT  Goal: Performs mobility at highest level of function for planned discharge setting  See evaluation for individualized goals  Description: Treatment/Interventions: LE strengthening/ROM, Functional transfer training, ADL retraining, Therapeutic exercise, Endurance training, Cognitive reorientation, Patient/family training, Bed mobility, Gait training  Equipment Recommended: Naresh Vargas       See flowsheet documentation for full assessment, interventions and recommendations  Note: Prognosis: Good  Problem List: Decreased strength, Decreased endurance, Impaired balance, Decreased mobility, Impaired judgement, Decreased cognition, Decreased safety awareness  Assessment: Pt is 80 y o  female seen for high complexity PT evaluation s/p admission to [facility] on 3/15/21 with abdominal pain, nausea, and emesis  Primary treating dx is SBO; pt refusing NG tube  Comorbidities affecting pt's physical performance at time of assessment include: DM, HTN, migraine, and hx of basal cell carcinoma  Pt is a questionable historian 2* cognition deficit  Per chart, pt resides alone in a 2SH with 1 JENSEN and FF to 2nd floor; reports her daughter has been staying with her  Pt reports she is independent with ADLs however, chart reports pt requires min A x 1  Currently, pt is at supervision level for bed mobility, min A x 1 for functional transfers, and mod A x 1 for ambulation with and without RW  Pt is impulsively and mildly unsteady  Patient's decreased mobility level and increased fall risk is secondary to deficits in cognition, safety awareness, gait deviations, gait speed, balance, and generalized weakness/deconditioning  Current clinical presentation is unstable/unpredictable seen in pt's presentation of ongoing medical management, increased reliance on assistance compared to PLOF, impaired judgement/safety awareness, and significant PMH   Pt to benefit from continued PT tx to address deficits as defined above and maximize level of functional independent mobility and consistency  From PT/mobility standpoint, recommendation at time of d/c would be STR pending progress in order to facilitate return to PLOF  Barriers to Discharge: Inaccessible home environment     PT Discharge Recommendation: [id number]     PT - OK to Discharge: Yes    See flowsheet documentation for full assessment

## 2021-03-17 NOTE — PROGRESS NOTES
Progress Note - General Surgery   Nadine Villalpando 80 y o  female MRN: [id number]  Unit/Bed#: -01 Encounter: [id number]    Assessment:  80 y o  female with SBO       Plan:  NPO  Patient refusing NGT  Continue LR @ 100  SQH    Subjective/Objective     Subjective: Denies nausea/emesis, endorses gas but denies BM      Objective:     Vitals: Temp:  [97 7 °F (36 5 °C)-97 9 °F (36 6 °C)] 97 7 °F (36 5 °C)  HR:  [69-77] 77  Resp:  [18] 18  BP: (160-195)/(68-93) 195/93  Body mass index is 22 47 kg/m²  I/O       03/15 0701 - 03/16 0700 03/16 0701 - 03/17 0700    P  O   0    I V  (mL/kg)  975 (15 9)    Total Intake(mL/kg)  975 (15 9)    Net  +975          Unmeasured Urine Occurrence  2 x          Physical Exam:  GEN: NAD  HEENT: MMM  CV: RRR  Lung: Normal effort  Ab: Soft, NT, still somewhat distended  Extrem: No CCE   Neuro: A+Ox3     Lab, Imaging and other studies: I have personally reviewed pertinent reports    , CBC with diff: No results found for: WBC, HGB, HCT, MCV, PLT, ADJUSTEDWBC, MCH, MCHC, RDW, MPV, NRBC, BMP/CMP: No results found for: SODIUM, K, CL, CO2, ANIONGAP, BUN, CREATININE, GLUCOSE, CALCIUM, AST, ALT, ALKPHOS, PROT, BILITOT, EGFR  VTE Pharmacologic Prophylaxis: Heparin  VTE Mechanical Prophylaxis: sequential compression device        Becka Guillory MD  3/17/2021 5:38 AM

## 2021-03-18 LAB
ANION GAP SERPL CALCULATED.3IONS-SCNC: 3 MMOL/L (ref 4–13)
BUN SERPL-MCNC: 13 MG/DL (ref 5–25)
CALCIUM SERPL-MCNC: 8.9 MG/DL (ref 8.3–10.1)
CHLORIDE SERPL-SCNC: 109 MMOL/L (ref 100–108)
CO2 SERPL-SCNC: 28 MMOL/L (ref 21–32)
CREAT SERPL-MCNC: 0.94 MG/DL (ref 0.6–1.3)
GFR SERPL CREATININE-BSD FRML MDRD: 51 ML/MIN/1.73SQ M
GLUCOSE SERPL-MCNC: 64 MG/DL (ref 65–140)
POTASSIUM SERPL-SCNC: 4.2 MMOL/L (ref 3.5–5.3)
SODIUM SERPL-SCNC: 140 MMOL/L (ref 136–145)

## 2021-03-18 PROCEDURE — 80048 BASIC METABOLIC PNL TOTAL CA: CPT | Performed by: SURGERY

## 2021-03-18 PROCEDURE — 99231 SBSQ HOSP IP/OBS SF/LOW 25: CPT | Performed by: SURGERY

## 2021-03-18 RX ORDER — ACETAMINOPHEN 325 MG/1
975 TABLET ORAL EVERY 6 HOURS PRN
Status: DISCONTINUED | OUTPATIENT
Start: 2021-03-18 | End: 2021-03-19 | Stop reason: HOSPADM

## 2021-03-18 RX ORDER — LANOLIN ALCOHOL/MO/W.PET/CERES
3 CREAM (GRAM) TOPICAL
Status: DISCONTINUED | OUTPATIENT
Start: 2021-03-18 | End: 2021-03-19 | Stop reason: HOSPADM

## 2021-03-18 RX ADMIN — LABETALOL 20 MG/4 ML (5 MG/ML) INTRAVENOUS SYRINGE 10 MG: at 11:30

## 2021-03-18 RX ADMIN — HEPARIN SODIUM 5000 UNITS: 5000 INJECTION INTRAVENOUS; SUBCUTANEOUS at 06:12

## 2021-03-18 RX ADMIN — MELATONIN 3 MG: at 21:05

## 2021-03-18 RX ADMIN — FAMOTIDINE 20 MG: 20 TABLET ORAL at 09:45

## 2021-03-18 RX ADMIN — LABETALOL 20 MG/4 ML (5 MG/ML) INTRAVENOUS SYRINGE 10 MG: at 06:12

## 2021-03-18 RX ADMIN — HEPARIN SODIUM 5000 UNITS: 5000 INJECTION INTRAVENOUS; SUBCUTANEOUS at 21:03

## 2021-03-18 RX ADMIN — CARVEDILOL 25 MG: 25 TABLET, FILM COATED ORAL at 17:58

## 2021-03-18 RX ADMIN — MELATONIN 3 MG: at 03:05

## 2021-03-18 RX ADMIN — CARVEDILOL 25 MG: 25 TABLET, FILM COATED ORAL at 09:45

## 2021-03-18 RX ADMIN — HEPARIN SODIUM 5000 UNITS: 5000 INJECTION INTRAVENOUS; SUBCUTANEOUS at 13:50

## 2021-03-18 RX ADMIN — AMLODIPINE BESYLATE 10 MG: 10 TABLET ORAL at 09:44

## 2021-03-18 NOTE — CASE MANAGEMENT
CM received STR choices for 1  MV, 2  FM, 3  CM  Referrals placed in [facility] per request  A post acute care recommendation was made by your care team for STR  Discussed Freedom of Choice with caregiver  List of agencies given to caregiver via emailed  caregiver aware the list is custom filtered for them by preference  and that [facility] post acute providers are designated

## 2021-03-18 NOTE — PLAN OF CARE
Problem: PAIN - ADULT  Goal: Verbalizes/displays adequate comfort level or baseline comfort level  Description: Interventions:  - Encourage patient to monitor pain and request assistance  - Assess pain using appropriate pain scale  - Administer analgesics based on type and severity of pain and evaluate response  - Implement non-pharmacological measures as appropriate and evaluate response  - Consider cultural and social influences on pain and pain management  - Notify physician/advanced practitioner if interventions unsuccessful or patient reports new pain  Outcome: Progressing     Problem: INFECTION - ADULT  Goal: Absence or prevention of progression during hospitalization  Description: INTERVENTIONS:  - Assess and monitor for signs and symptoms of infection  - Monitor lab/diagnostic results  - Monitor all insertion sites, i e  indwelling lines, tubes, and drains  - Monitor endotracheal if appropriate and nasal secretions for changes in amount and color  - [place] appropriate cooling/warming therapies per order  - Administer medications as ordered  - Instruct and encourage patient and family to use good hand hygiene technique  - Identify and instruct in appropriate isolation precautions for identified infection/condition  Outcome: Progressing  Goal: Absence of fever/infection during neutropenic period  Description: INTERVENTIONS:  - Monitor WBC    Outcome: Progressing     Problem: SAFETY ADULT  Goal: Patient will remain free of falls  Description: INTERVENTIONS:  - Assess patient frequently for physical needs  -  Identify cognitive and physical deficits and behaviors that affect risk of falls    -  [place] fall precautions as indicated by assessment   - Educate patient/family on patient safety including physical limitations  - Instruct patient to call for assistance with activity based on assessment  - Modify environment to reduce risk of injury  - Consider OT/PT consult to assist with strengthening/mobility  Outcome: Progressing  Goal: Maintain or return to baseline ADL function  Description: INTERVENTIONS:  -  Assess patient's ability to carry out ADLs; assess patient's baseline for ADL function and identify physical deficits which impact ability to perform ADLs (bathing, care of mouth/teeth, toileting, grooming, dressing, etc )  - Assess/evaluate cause of self-care deficits   - Assess range of motion  - Assess patient's mobility; develop plan if impaired  - Assess patient's need for assistive devices and provide as appropriate  - Encourage maximum independence but intervene and supervise when necessary  - Involve family in performance of ADLs  - Assess for home care needs following discharge   - Consider OT consult to assist with ADL evaluation and planning for discharge  - Provide patient education as appropriate  Outcome: Progressing  Goal: Maintain or return mobility status to optimal level  Description: INTERVENTIONS:  - Assess patient's baseline mobility status (ambulation, transfers, stairs, etc )    - Identify cognitive and physical deficits and behaviors that affect mobility  - Identify mobility aids required to assist with transfers and/or ambulation (gait belt, sit-to-stand, lift, walker, cane, etc )  - [place] fall precautions as indicated by assessment  - Record patient progress and toleration of activity level on Mobility SBAR; progress patient to next Phase/Stage  - Instruct patient to call for assistance with activity based on assessment  - Consider rehabilitation consult to assist with strengthening/weightbearing, etc   Outcome: Progressing     Problem: DISCHARGE PLANNING  Goal: Discharge to home or other facility with appropriate resources  Description: INTERVENTIONS:  - Identify barriers to discharge w/patient and caregiver  - Arrange for needed discharge resources and transportation as appropriate  - Identify discharge learning needs (meds, wound care, etc )  - Arrange for interpretive services to assist at discharge as needed  - Refer to Case Management Department for coordinating discharge planning if the patient needs post-hospital services based on physician/advanced practitioner order or complex needs related to functional status, cognitive ability, or social support system  Outcome: Progressing     Problem: Knowledge Deficit  Goal: Patient/family/caregiver demonstrates understanding of disease process, treatment plan, medications, and discharge instructions  Description: Complete learning assessment and assess knowledge base  Interventions:  - Provide teaching at level of understanding  - Provide teaching via preferred learning methods  Outcome: Progressing     Problem: Prexisting or High Potential for Compromised Skin Integrity  Goal: Skin integrity is maintained or improved  Description: INTERVENTIONS:  - Identify patients at risk for skin breakdown  - Assess and monitor skin integrity  - Assess and monitor nutrition and hydration status  - Monitor labs   - Assess for incontinence   - Turn and reposition patient  - Assist with mobility/ambulation  - Relieve pressure over bony prominences  - Avoid friction and shearing  - Provide appropriate hygiene as needed including keeping skin clean and dry  - Evaluate need for skin moisturizer/barrier cream  - Collaborate with interdisciplinary team   - Patient/family teaching  - Consider wound care consult   Outcome: Progressing     Problem: Potential for Falls  Goal: Patient will remain free of falls  Description: INTERVENTIONS:  - Assess patient frequently for physical needs  -  Identify cognitive and physical deficits and behaviors that affect risk of falls    -  [place] fall precautions as indicated by assessment   - Educate patient/family on patient safety including physical limitations  - Instruct patient to call for assistance with activity based on assessment  - Modify environment to reduce risk of injury  - Consider OT/PT consult to assist with strengthening/mobility  Outcome: Progressing     Problem: GASTROINTESTINAL - ADULT  Goal: Minimal or absence of nausea and/or vomiting  Description: INTERVENTIONS:  - Administer IV fluids if ordered to ensure adequate hydration  - Maintain NPO status until nausea and vomiting are resolved  - Nasogastric tube if ordered  - Administer ordered antiemetic medications as needed  - Provide nonpharmacologic comfort measures as appropriate  - Advance diet as tolerated, if ordered  - Consider nutrition services referral to assist patient with adequate nutrition and appropriate food choices  Outcome: Progressing  Goal: Maintains or returns to baseline bowel function  Description: INTERVENTIONS:  - Assess bowel function  - Encourage oral fluids to ensure adequate hydration  - Administer IV fluids if ordered to ensure adequate hydration  - Administer ordered medications as needed  - Encourage mobilization and activity  - Consider nutritional services referral to assist patient with adequate nutrition and appropriate food choices  Outcome: Progressing  Goal: Maintains adequate nutritional intake  Description: INTERVENTIONS:  - Monitor percentage of each meal consumed  - Identify factors contributing to decreased intake, treat as appropriate  - Assist with meals as needed  - Monitor I&O, weight, and lab values if indicated  - Obtain nutrition services referral as needed  Outcome: Progressing

## 2021-03-18 NOTE — QUICK NOTE
Called patient's daughter at patient's request  I updated her as to her mother's status and plan, which is to hopefully discharge to rehab in the next few days if she tolerates her diet

## 2021-03-18 NOTE — PROGRESS NOTES
Progress Note - General Surgery   [facility] 80 y o  female MRN: [id number]  Unit/Bed#: -01 Encounter: [id number]    Assessment:  80 y o  female with SBO, resolving       Plan:  Encourage PO   Discussed with RN that patient must eat before she can be discharged, RN will try to find her something that she likes so that she can eat  Continue CV/lo fat diet  D/C IVF  If tolerating adequate PO, ok to discharge to rehab    Subjective/Objective     Subjective: Denies nausea/emesis, picked at dinner but did not like the food  Endorses gas but no BM      Objective:     Vitals: Temp:  [97 3 °F (36 3 °C)-98 9 °F (37 2 °C)] 98 9 °F (37 2 °C)  HR:  [71-80] 80  Resp:  [18] 18  BP: (181-196)/(90-95) 188/91  Body mass index is 22 47 kg/m²  I/O       03/16 0701 - 03/17 0700 03/17 0701 - 03/18 0700    P  O  0 360    I V  (mL/kg) 975 (15 9) 1240 (20 3)    Total Intake(mL/kg) 975 (15 9) 1600 (26 1)    Urine (mL/kg/hr)  800 (0 5)    Total Output  800    Net +975 +800          Unmeasured Urine Occurrence 2 x 3 x    Unmeasured Stool Occurrence  0 x          Physical Exam:  GEN: NAD  HEENT: MMM  CV: RRR  Lung: Normal effort  Ab: Soft, ND/NT  Extrem: No CCE   Neuro: A+Ox3     Lab, Imaging and other studies: I have personally reviewed pertinent reports    , CBC with diff:   Lab Results   Component Value Date    WBC 8 00 03/17/2021    HGB 10 1 (L) 03/17/2021    HCT 34 3 (L) 03/17/2021    MCV 89 03/17/2021     (H) 03/17/2021    MCH 26 2 (L) 03/17/2021    MCHC 29 4 (L) 03/17/2021    RDW 16 0 (H) 03/17/2021    MPV 9 9 03/17/2021    NRBC 0 03/17/2021   , BMP/CMP:   Lab Results   Component Value Date    SODIUM 143 03/17/2021    K 3 6 03/17/2021     (H) 03/17/2021    CO2 28 03/17/2021    BUN 14 03/17/2021    CREATININE 0 93 03/17/2021    CALCIUM 8 7 03/17/2021    EGFR 52 03/17/2021     VTE Pharmacologic Prophylaxis: Heparin  VTE Mechanical Prophylaxis: sequential compression device        Moshe Zhu MD  3/18/2021 5:49 AM

## 2021-03-19 VITALS
TEMPERATURE: 98.1 F | BODY MASS INDEX: 22.49 KG/M2 | RESPIRATION RATE: 18 BRPM | DIASTOLIC BLOOD PRESSURE: 65 MMHG | SYSTOLIC BLOOD PRESSURE: 161 MMHG | WEIGHT: 135 LBS | HEIGHT: 65 IN | OXYGEN SATURATION: 97 % | HEART RATE: 72 BPM

## 2021-03-19 LAB
FLUAV RNA RESP QL NAA+PROBE: NEGATIVE
FLUBV RNA RESP QL NAA+PROBE: NEGATIVE
RSV RNA RESP QL NAA+PROBE: NEGATIVE
SARS-COV-2 RNA RESP QL NAA+PROBE: NEGATIVE

## 2021-03-19 PROCEDURE — NC001 PR NO CHARGE: Performed by: SURGERY

## 2021-03-19 PROCEDURE — 0241U HB NFCT DS VIR RESP RNA 4 TRGT: CPT | Performed by: SURGERY

## 2021-03-19 PROCEDURE — 99238 HOSP IP/OBS DSCHRG MGMT 30/<: CPT | Performed by: PHYSICIAN ASSISTANT

## 2021-03-19 PROCEDURE — 97535 SELF CARE MNGMENT TRAINING: CPT

## 2021-03-19 RX ADMIN — AMLODIPINE BESYLATE 10 MG: 10 TABLET ORAL at 09:44

## 2021-03-19 RX ADMIN — FAMOTIDINE 20 MG: 20 TABLET ORAL at 09:44

## 2021-03-19 RX ADMIN — HEPARIN SODIUM 5000 UNITS: 5000 INJECTION INTRAVENOUS; SUBCUTANEOUS at 05:38

## 2021-03-19 RX ADMIN — CARVEDILOL 25 MG: 25 TABLET, FILM COATED ORAL at 09:44

## 2021-03-19 NOTE — PLAN OF CARE
Problem: OCCUPATIONAL THERAPY ADULT  Goal: Performs self-care activities at highest level of function for planned discharge setting  See evaluation for individualized goals  Description: Treatment Interventions: ADL retraining, Functional transfer training, UE strengthening/ROM, Endurance training, Equipment evaluation/education, Patient/family training, Cognitive reorientation, Fine motor coordination activities, Compensatory technique education, Energy conservation, Activityengagement          See flowsheet documentation for full assessment, interventions and recommendations  Outcome: Progressing  Note: Limitation: Decreased ADL status, Decreased UE strength, Decreased Safe judgement during ADL, Decreased cognition, Decreased endurance, Decreased self-care trans, Decreased high-level ADLs  Prognosis: Fair, Guarded  Assessment: Patient participated in Skilled OT session this date with interventions consisting of ADL re training with the use of correct body mechnaics, Energy Conservation techniques and safety awareness and fall prevention techniques   Patient agreeable to OT treatment session, upon arrival patient was found supine in bed  In comparison to previous session, patient with improvements in activity tolerance/transfers*   Patient requiring ocassional safety reminders  Patient continues to be functioning below baseline level, occupational performance remains limited secondary to factors listed above and increased risk for falls and injury  From OT standpoint, recommendation at time of d/c would be Short Term Rehab  Patient to benefit from continued Occupational Therapy treatment while in the hospital to address deficits as defined above and maximize level of functional independence with ADLs and functional mobility  The patient's raw score on the AM-PAC Daily Activity inpatient short form is 17, standardized score is 37 26, less than 39 4   Patients at this level are likely to benefit from discharge to post-acute rehabilitation services  Please refer to the recommendation of the Occupational Therapist for safe discharge planning  OT Discharge Recommendation: Post-Acute Rehabilitation Services  OT - OK to Discharge:  Yes

## 2021-03-19 NOTE — CASE MANAGEMENT
[facility] able to accept pt for d/c today  CM arranged BLS transport for 1pm p/u via [facility]    CM informed REGINE, RN, talatr-Cailin, and CB-A[facility]

## 2021-03-19 NOTE — PROGRESS NOTES
Progress Note - General Surgery   [place] Cyndi 80 y o  female MRN: [id number]  Unit/Bed#: -01 Encounter: [id number]    Assessment:  80 y o  female with SBO  Plan:  Regular diet, encourage PO intake  OOB/ambulate  Rehab dispo today      Subjective/Objective     Subjective: tolerated PO but doesn't like the food, nibbled at some things, denies nausea/vomiting  Passing flatus but no BM      Objective:     Vitals: Temp:  [98 1 °F (36 7 °C)-99 1 °F (37 3 °C)] 99 1 °F (37 3 °C)  HR:  [68-83] 77  BP: (156-193)/(65-91) 162/71  Body mass index is 22 47 kg/m²  I/O       03/16 0701 - 03/17 0700 03/17 0701 - 03/18 0700    P  O  0 360    I V  (mL/kg) 975 (15 9) 1240 (20 3)    Total Intake(mL/kg) 975 (15 9) 1600 (26 1)    Urine (mL/kg/hr)  800 (0 5)    Total Output  800    Net +975 +800          Unmeasured Urine Occurrence 2 x 3 x    Unmeasured Stool Occurrence  0 x          Physical Exam:  NAD, alert and cooperative  Normocephalic, atraumatic  MMM, EOMI, PERRLA  Norm resp effort on RA  RRR  Abd soft, NT/ND  No calf tenderness or peripheral edema  Motor/sensation intact in distal extremities  CN grossly intact  -rash/lesions      Lab, Imaging and other studies: I have personally reviewed pertinent reports    , CBC with diff:   No results found for: WBC, HGB, HCT, MCV, PLT, ADJUSTEDWBC, MCH, MCHC, RDW, MPV, NRBC, BMP/CMP:   No results found for: SODIUM, K, CL, CO2, ANIONGAP, BUN, CREATININE, GLUCOSE, CALCIUM, AST, ALT, ALKPHOS, PROT, BILITOT, EGFR  VTE Pharmacologic Prophylaxis: Heparin  VTE Mechanical Prophylaxis: sequential compression device        Fifi Braun MD  3/19/2021 6:04 AM

## 2021-03-19 NOTE — TRANSPORTATION MEDICAL NECESSITY
Section I - General Information    Name of Patient: Sharyn Mccurdy                 : 1923    Medicare #: [id number]  Transport Date: 21 (PCS is valid for round trips on this date and for all repetitive trips in the 60-day range as noted below )  Origin: [address] [place], [place] [id number] [address]                                 Destination: [facility]  Is the pt's stay covered under Medicare Part A (PPS/DRG)   []     Closest appropriate facility? If no, why is transport to more distant facility required? Yes  If hospice pt, is this transport related to pt's terminal illness? NA   Section II - Medical Necessity Questionnaire  Ambulance transportation is medically necessary only if other means of transport are contraindicated or would be potentially harmful to the patient  To meet this requirement, the patient must either be "bed confined" or suffer from a condition such that transport by means other than ambulance is contraindicated by the patient's condition  The following questions must be answered by the medical professional signing below for this form to be valid:    1)  Describe the MEDICAL CONDITION (physical and/or mental) of this patient AT [address] [place] [address] that requires the patient to be transported in an ambulance and why transport by other means is contraindicated by the patient's condition: confused, 2LO2, fall risk, impulsive    2) Is the patient "bed confined" as defined below? No  To be "be confined" the patient must satisfy all three of the following conditions: (1) unable to get up from bed without Assistance; AND (2) unable to ambulate; AND (3) unable to sit in a chair or wheelchair  3) Can this patient safely be transported by car or wheelchair van (i e , seated during transport without a medical attendant or monitoring)?    No    4) In addition to completing questions 1-3 above, please check any of the following conditions that apply*:   *Note: supporting documentation for any boxes checked must be maintained in the patient's medical records  If hosp-hosp transfer, describe services needed at 2nd facility not available at 1st facility? Patient is confused  Medical attendant required   Requires oxygen-unable to self administer  Section III - Signature of Physician or Healthcare Professional  I certify that the above information is true and correct based on my evaluation of this patient, and represent that the patient requires transport by ambulance and that other forms of transport are contraindicated  I understand that this information will be used by the Centers for Medicare and Medicaid Services (CMS) to support the determination of medical necessity for ambulance services, and I represent that I have personal knowledge of the patient's condition at time of transport  []  If this box is checked, I also certify that the patient is physically or mentally incapable of signing the ambulance service's claim and that the institution with which I am affiliated has furnished care, services, or assistance to the patient  My signature below is made on behalf of the patient pursuant to 42 CFR §424 36(b)(4)  In accordance with 42 CFR §424 37, the specific reason(s) that the patient is physically or mentally incapable of signing the claim form is as follows:    Signature of Physician* or Healthcare Professional_________________________________  Signature Date 03/19/21 (For scheduled repetitive transports, this form is not valid for transports performed more than 60 days after this date)    Printed Name & Credentials of Physician or Healthcare Professional (MD, DO, RN, etc )___STEPHEN Branch_______  *Form must be signed by patient's attending physician for scheduled, repetitive transports   For non-repetitive, unscheduled ambulance transports, if unable to obtain the signature of the attending physician, any of the following may sign (choose appropriate option below)  [] Physician Assistant []  Clinical Nurse Specialist []  Registered Nurse  []  Nurse Practitioner  [x] Discharge Planner

## 2021-03-19 NOTE — DISCHARGE INSTRUCTIONS
Acute Care Surgery Discharge Instructions    Please follow-up as instructed or on an as needed basis  If you need a follow-up appointment, please call the office when you leave to schedule an appointment  Activity:  - You may resume activity as tolerated with assistance  - Continue PT and OT evaluation and treatment as indicated  Diet:    - You may resume your normal diet  Medications:  - You should continue your current medication regimenafter discharge unless otherwise instructed  Please refer to your discharge medication list for further details  - You may become constipated, especially if taking pain medications  You may take any over the counter stool softeners or laxatives as needed  Examples: Milk of Magnesia, Colace, Senna  Additional Instructions:  - May shower daily and/or bathe normally  - If you have any questions or concerns after discharge please call the office   - Call office or return to ER if fever greater than 101, chills, persistent nausea/vomiting, and/or worsening/uncontrollable pain

## 2021-03-19 NOTE — RESTORATIVE TECHNICIAN NOTE
Restorative Specialist Mobility Note       Activity: Ambulate in room, Bathroom privileges, Dangle, Stand at bedside(Educated/encouraged pt to ambulate with assistance 3-4 x's/day  Bed alarm on   Pt callbell, phone/tray within reach )     Assistive Device: Front wheel walker    Daniel HARRISON, Restorative Technician, [employer]

## 2021-03-19 NOTE — ASSESSMENT & PLAN NOTE
- Small bowel obstruction, present on admission, likely secondary to adhesive disease now resolved  - Continue diet as tolerated  - Continue to monitor for bowel function  - Activity as tolerated with assistance  - Continue PT and OT evaluation and treatment as indicated  - Stable for discharge on 03/19/2021 to post acute care facility for rehab

## 2021-03-19 NOTE — DISCHARGE SUMMARY
[facility]  Discharge- Yimi Perez 5/2/1923, 80 y o  female MRN: [id number]  Unit/Bed#: -01 Encounter: [id number]  Primary Care Provider: King Peace DO   Date and time admitted to hospital: 3/15/2021  7:12 PM    * SBO (small bowel obstruction) [facility])  Assessment & Plan  - Small bowel obstruction, present on admission, likely secondary to adhesive disease now resolved  - Continue diet as tolerated  - Continue to monitor for bowel function  - Activity as tolerated with assistance  - Continue PT and OT evaluation and treatment as indicated  - Stable for discharge on 03/19/2021 to post acute care facility for rehab  Discharge Summary - General Surgery   Yimi Perez 80 y o  female MRN: [id number]  Unit/Bed#: -01 Encounter: [id number]    Admission Date: 3/15/2021     Discharge Date: 3/19/2021    Admitting Diagnosis: No admission diagnoses are documented for this encounter  Discharge Diagnosis: See above  Attending and Service: Dr Alex Alicea, Acute Care Surgical Services  Consulting Physician(s): None  Imaging and Procedures Performed:     Ct Abdomen Pelvis With Contrast    Result Date: 3/15/2021  Impression: Fluid-filled loops of small bowel measuring up to 3 cm with surrounding inflammatory changes  No definite transition point  Findings may represent early small bowel obstruction versus ileus  Recommend small bowel follow-through for further evaluation  Colonic diverticulosis with minimal inflammatory changes around the sigmoid colon which may represent acute diverticulitis  No drainable fluid collection  If not already performed recently, colonoscopy after the acute illness is recommended to rule out possibility of colon cancer mimicking diverticulitis  Markedly distended gallbladder similar to prior study  If there is concern for acute cholecystitis ultrasound should be obtained    I personally discussed this study with Bulmaro Cam on 3/15/2021 at 8:59 PM  Workstation performed: [id number]     Hospital Course: Richard Piper is a 59-year-old female who presented with abdominal pain associated with nausea vomiting that began the morning of presentation  The pain began the morning of presentation after the patient had a couple very large bowel movements  The pain she endorse was mostly on the right side  She denied any other associated symptoms  On her initial evaluation mother surgical service, she was afebrile with normal vital signs other than hypertension; she was noted to have cataract of her left eye; her abdomen was soft, but was distended and tympanic without tenderness; the remainder of her exam was unremarkable  Her initial workup included labs in the above-noted CT scan  She was admitted to the acute care surgery service with a small-bowel obstruction  She was kept NPO, started on IV fluids and analgesia as needed as well as DVT prophylaxis  An NG tube was recommended for bowel decompression, but the patient declined insertion of the tube  She was closely monitored with aspiration precautions and serial abdominal exams  The patient slowly improved and continued to have a benign abdominal exam throughout her hospitalization  She was kept NPO until her abdominal distention improved and she began to have return of bowel function  Upon return of bowel function and improvement of her abdominal distension, her diet was advanced as tolerated  Once tolerating an oral diet, her IV fluids were discontinued and her home medications were resumed  PT and OT were consulted and recommended rehab  Case Management assisted with disposition planning to a post acute care facility  She was deemed stable for discharge on 03/19/2021  On discharge, the patient is instructed to follow-up with the patient's primary care provider within the next 2 weeks to review the events of the recent hospitalization   The patient is instructed to follow-up with the Acute Care Surgical Services as needed  The patient is instructed to follow the provided discharge instructions  Condition at Discharge: fair     Discharge instructions/Information to patient and family:   See after visit summary for information provided to patient and family  Provisions for Follow-Up Care:  See after visit summary for information related to follow-up care and any pertinent home health orders  Disposition: See After Visit Summary for discharge disposition information  Planned Readmission: No    Discharge Statement   I spent 20 minutes discharging the patient  This time was spent on the day of discharge  I had direct contact with the patient on the day of discharge  Additional documentation is required if more than 30 minutes were spent on discharge  Discharge Medications:  See after visit summary for reconciled discharge medications provided to patient and family      Alistair Anthony PA-C  3/19/2021  10:24 AM

## 2021-03-19 NOTE — OCCUPATIONAL THERAPY NOTE
OccupationalTherapy Progress Note     Patient Name: Cherry Mercedes  [id number] Date: 3/19/2021  Problem List  Principal Problem:    SBO (small bowel obstruction) ([facility] [address] )        03/19/21 1015   OT Last Visit   OT Visit Date 03/19/21   Note Type   Note Type Treatment   Restrictions/Precautions   Weight Bearing Precautions Per Order No   Other Precautions Cognitive; Chair Alarm; Bed Alarm;Hard of hearing  (2L NC)   General   Response to Previous Treatment Patient with no complaints from previous session   Lifestyle   Autonomy PTA PT REPORTS SHE WAS VERY I W/ ADLS/IADLS/FUNCTIONAL MOBILITY- SOMEWHAT INCONSISTENT HISTORIAN   Reciprocal Relationships LIVES W/ DTR   Intrinsic Gratification SPENDING TIME W/ FAMILY   Pain Assessment   Pain Assessment Tool Pain Assessment not indicated - pt denies pain   Pain Score No Pain   ADL   Where Assessed Edge of bed   Grooming Assistance 5  Supervision/Setup   Grooming Deficit Wash/dry hands; Wash/dry face; Teeth care;Brushing hair   Grooming Comments COMPLETED SEATED   UB Bathing Assistance 4  Minimal Assistance   UB Bathing Deficit Right arm;Left arm; Chest   UB Bathing Comments ASSIST W/ BACK   LB Bathing Assistance 3  Moderate Assistance   LB Bathing Deficit Right lower leg including foot; Left lower leg including foot   UB Dressing Assistance 4  Minimal Assistance   UB Dressing Deficit Pull over head   LB Dressing Assistance 3  Moderate Assistance   LB Dressing Deficit Thread RLE into pants; Thread LLE into pants; Thread RLE into underwear; Thread LLE into underwear;Pull up over hips;Don/doff R shoe;Don/doff L shoe   Toileting Assistance  4  Minimal Assistance   Toileting Deficit Clothing management up;Clothing management down   Toileting Comments ASSIST FOR PULLING UP UNDERPANTS AND PANTS   Bed Mobility   Supine to Sit 4  Minimal assistance   Additional items Assist x 1; Increased time required;Verbal cues   Sit to Supine 5  Supervision   Additional items Increased time required Transfers   Sit to Stand 4  Minimal assistance   Additional items Assist x 1; Increased time required;Verbal cues   Stand to Sit 4  Minimal assistance   Additional items Assist x 1; Increased time required;Verbal cues   Toilet transfer 4  Minimal assistance   Additional items Assist x 1; Increased time required;Verbal cues   Functional Mobility   Functional Mobility 3  Moderate assistance   Additional Comments HHA- REFUSED RW- REQUIRED HHA DUE TO FURNITURE WALKING AND CLINGING TO CURTAIN   Additional items Hand hold assistance   Coordination   Fine Motor ABLE TO SCREW/UNSCREW CAPS   Cognition   Overall Cognitive Status Impaired   Arousal/Participation Cooperative   Attention Attends with cues to redirect   Orientation Level Oriented to person;Oriented to place;Oriented to situation;Disoriented to time   Memory Decreased recall of precautions;Decreased recall of recent events   Following Commands Follows one step commands with increased time or repetition   Comments PT REQUIRING CUES FOR SAFETY AT TIMES   Activity Tolerance   Activity Tolerance Patient limited by fatigue   Medical Staff Made Aware RN CONNA[facility]   Assessment   Assessment Patient participated in Skilled OT session this date with interventions consisting of ADL re training with the use of correct body mechnaics, Energy Conservation techniques and safety awareness and fall prevention techniques   Patient agreeable to OT treatment session, upon arrival patient was found supine in bed  In comparison to previous session, patient with improvements in activity tolerance/transfers*   Patient requiring ocassional safety reminders  Patient continues to be functioning below baseline level, occupational performance remains limited secondary to factors listed above and increased risk for falls and injury  From OT standpoint, recommendation at time of d/c would be Short Term Rehab     Patient to benefit from continued Occupational Therapy treatment while in the hospital to address deficits as defined above and maximize level of functional independence with ADLs and functional mobility  The patient's raw score on the AM-PAC Daily Activity inpatient short form is 17, standardized score is 37 26, less than 39 4  Patients at this level are likely to benefit from discharge to post-acute rehabilitation services  Please refer to the recommendation of the Occupational Therapist for safe discharge planning  Plan   Treatment Interventions ADL retraining;Functional transfer training; Endurance training;Patient/family training; Compensatory technique education; Energy conservation   Goal Expiration Date 03/31/21   OT Treatment Day 2   OT Frequency 3-5x/wk   Recommendation   OT Discharge Recommendation Post-Acute Rehabilitation Services   OT - OK to Discharge Yes   AM-PAC Daily Activity Inpatient   Lower Body Dressing 2   Bathing 2   Toileting 3   Upper Body Dressing 3   Grooming 3   Eating 4   Daily Activity Raw Score 17   Daily Activity Standardized Score (Calc for Raw Score >=11) 37 26   AM-PAC Applied Cognition Inpatient   Following a Speech/Presentation 3   Understanding Ordinary Conversation 4   Taking Medications 2   Remembering Where Things Are Placed or Put Away 2   Remembering List of 4-5 Errands 2   Taking Care of Complicated Tasks 2   Applied Cognition Raw Score 15   Applied Cognition Standardized Score 33 54   Modified [place] Scale   Modified [place] Scale 4     Nahomi Gutierrez MS, OTR/L

## 2021-03-22 ENCOUNTER — NURSING HOME VISIT (OUTPATIENT)
Dept: GERIATRICS | Facility: OTHER | Age: 86
End: 2021-03-22
Payer: MEDICARE

## 2021-03-22 DIAGNOSIS — I10 HYPERTENSION, UNSPECIFIED TYPE: ICD-10-CM

## 2021-03-22 DIAGNOSIS — D75.839 THROMBOCYTOSIS: ICD-10-CM

## 2021-03-22 DIAGNOSIS — Z71.89 GOALS OF CARE, COUNSELING/DISCUSSION: ICD-10-CM

## 2021-03-22 DIAGNOSIS — K21.00 GASTROESOPHAGEAL REFLUX DISEASE WITH ESOPHAGITIS, UNSPECIFIED WHETHER HEMORRHAGE: ICD-10-CM

## 2021-03-22 DIAGNOSIS — K56.609 SBO (SMALL BOWEL OBSTRUCTION) (HCC): Primary | ICD-10-CM

## 2021-03-22 DIAGNOSIS — H81.10 BENIGN PAROXYSMAL POSITIONAL VERTIGO, UNSPECIFIED LATERALITY: ICD-10-CM

## 2021-03-22 DIAGNOSIS — E11.9 TYPE 2 DIABETES MELLITUS WITHOUT COMPLICATION, WITHOUT LONG-TERM CURRENT USE OF INSULIN (HCC): Chronic | ICD-10-CM

## 2021-03-22 PROCEDURE — 99306 1ST NF CARE HIGH MDM 50: CPT | Performed by: INTERNAL MEDICINE

## 2021-03-23 ENCOUNTER — TELEPHONE (OUTPATIENT)
Dept: OTHER | Facility: OTHER | Age: 86
End: 2021-03-23

## 2021-03-23 PROBLEM — Z71.89 GOALS OF CARE, COUNSELING/DISCUSSION: Status: ACTIVE | Noted: 2021-03-23

## 2021-03-23 PROBLEM — E78.5 HYPERLIPIDEMIA: Status: ACTIVE | Noted: 2021-03-23

## 2021-03-23 PROBLEM — Z79.899 MEDICATION MANAGEMENT: Status: ACTIVE | Noted: 2021-03-23

## 2021-03-23 PROBLEM — F32.A DEPRESSIVE DISORDER: Status: ACTIVE | Noted: 2021-03-23

## 2021-03-23 PROBLEM — D75.839 THROMBOCYTOSIS: Status: ACTIVE | Noted: 2021-03-23

## 2021-03-23 PROBLEM — M48.00 SPINAL STENOSIS: Status: ACTIVE | Noted: 2021-03-23

## 2021-03-23 PROBLEM — D51.0 PERNICIOUS ANEMIA: Status: ACTIVE | Noted: 2021-03-23

## 2021-03-23 PROBLEM — K55.1 MESENTERIC VASCULAR INSUFFICIENCY (HCC): Status: ACTIVE | Noted: 2021-03-23

## 2021-03-23 PROBLEM — R10.84 GENERALIZED ABDOMINAL PAIN: Status: RESOLVED | Noted: 2020-12-11 | Resolved: 2021-03-23

## 2021-03-23 PROBLEM — J18.9 PNEUMONIA DUE TO INFECTIOUS ORGANISM: Status: RESOLVED | Noted: 2018-11-12 | Resolved: 2021-03-23

## 2021-03-23 PROBLEM — K21.00 GERD WITH ESOPHAGITIS: Status: ACTIVE | Noted: 2021-03-23

## 2021-03-23 PROBLEM — N30.00 ACUTE CYSTITIS WITHOUT HEMATURIA: Status: RESOLVED | Noted: 2020-12-11 | Resolved: 2021-03-23

## 2021-03-23 PROBLEM — H81.10 BENIGN PAROXYSMAL POSITIONAL VERTIGO: Status: ACTIVE | Noted: 2021-03-23

## 2021-03-23 PROBLEM — I10 HYPERTENSION: Status: ACTIVE | Noted: 2018-11-09

## 2021-03-23 PROBLEM — M35.3 POLYMYALGIA RHEUMATICA (HCC): Status: ACTIVE | Noted: 2021-03-23

## 2021-03-23 PROBLEM — H54.40 BLIND LEFT EYE: Status: ACTIVE | Noted: 2021-03-23

## 2021-03-23 PROBLEM — I16.0 HYPERTENSIVE URGENCY: Status: RESOLVED | Noted: 2019-10-11 | Resolved: 2021-03-23

## 2021-03-23 RX ORDER — CARVEDILOL 25 MG/1
1 TABLET ORAL 2 TIMES DAILY WITH MEALS
COMMUNITY

## 2021-03-23 RX ORDER — AMLODIPINE BESYLATE 10 MG/1
1 TABLET ORAL DAILY
COMMUNITY

## 2021-03-23 RX ORDER — DOCUSATE SODIUM 100 MG/1
1 CAPSULE, LIQUID FILLED ORAL DAILY
COMMUNITY

## 2021-03-23 NOTE — ASSESSMENT & PLAN NOTE
· December 11, 2020:  Hemoglobin A1c:  6 8%   · For now, I will continue her on her outpatient regimen of Januvia and Amaryl  · Will continue with monitoring of her fingerstick blood sugar levels and adjust her medications, as indicated   · She will follow-up with her PCP upon discharge

## 2021-03-23 NOTE — ASSESSMENT & PLAN NOTE
· Her blood pressure has been mildly elevated with amlodipine 10 mg daily, carvedilol 25 mg twice daily, and lisinopril 10 mg daily   · For now, I will continue her on this regimen as it is her routine regimen as an outpatient  · Will continue to monitor for change in condition and need to up titrate medication   · She will follow-up with her PCP upon discharge

## 2021-03-23 NOTE — ASSESSMENT & PLAN NOTE
· March 17, 2021:  Platelet count: [id number]   · Asymptomatic   · Will continue with clinical and periodic laboratory monitoring for change in condition

## 2021-03-23 NOTE — ASSESSMENT & PLAN NOTE
· Status post hospitalization from March 15, 2021 through March 19, 2021   · Treated with conservative management of NPO status, intravenous fluid resuscitation status, and analgesia   · Deemed improved at the time of discharge   · Will order PT/OT evaluation and treatment to assist her in returning to her prior level of functioning   · She will follow-up with the surgery service, as planned   · Will continue to monitor for change in condition  · She will follow-up with her PCP upon discharge

## 2021-03-23 NOTE — ASSESSMENT & PLAN NOTE
· Will continue her on her outpatient regimen of meclizine twice daily   · Will continue to monitor for change in condition  · She will follow-up with her PCP upon discharge

## 2021-03-23 NOTE — TELEPHONE ENCOUNTER
TigerText:    Cassie Mooney [address]/ [place]/ [address] [facility], [place] 06 07 [phone]/ Chest x ray results

## 2021-03-23 NOTE — ASSESSMENT & PLAN NOTE
· As discussed with her during my visit, she wishes for her resuscitation status to be "do not resuscitate "

## 2021-03-23 NOTE — ASSESSMENT & PLAN NOTE
· Will continue her on her outpatient regimen of famotidine twice daily   · Will continue to monitor for change in condition   · She will follow-up with her PCP upon discharge

## 2021-03-24 ENCOUNTER — APPOINTMENT (EMERGENCY)
Dept: RADIOLOGY | Facility: HOSPITAL | Age: 86
End: 2021-03-24
Payer: MEDICARE

## 2021-03-24 ENCOUNTER — HOSPITAL ENCOUNTER (EMERGENCY)
Facility: HOSPITAL | Age: 86
Discharge: HOME | End: 2021-03-24
Attending: EMERGENCY MEDICINE
Payer: MEDICARE

## 2021-03-24 ENCOUNTER — NURSING HOME VISIT (OUTPATIENT)
Dept: GERIATRICS | Facility: OTHER | Age: 86
End: 2021-03-24
Payer: MEDICARE

## 2021-03-24 VITALS
HEIGHT: 65 IN | WEIGHT: 145 LBS | RESPIRATION RATE: 18 BRPM | DIASTOLIC BLOOD PRESSURE: 67 MMHG | BODY MASS INDEX: 24.16 KG/M2 | HEART RATE: 66 BPM | OXYGEN SATURATION: 95 % | SYSTOLIC BLOOD PRESSURE: 139 MMHG

## 2021-03-24 VITALS
SYSTOLIC BLOOD PRESSURE: 152 MMHG | HEART RATE: 76 BPM | TEMPERATURE: 98.9 F | WEIGHT: 145 LBS | BODY MASS INDEX: 24.13 KG/M2 | RESPIRATION RATE: 18 BRPM | DIASTOLIC BLOOD PRESSURE: 52 MMHG | OXYGEN SATURATION: 90 %

## 2021-03-24 DIAGNOSIS — E11.9 TYPE 2 DIABETES MELLITUS WITHOUT COMPLICATION, WITHOUT LONG-TERM CURRENT USE OF INSULIN (HCC): Chronic | ICD-10-CM

## 2021-03-24 DIAGNOSIS — I10 HYPERTENSION, UNSPECIFIED TYPE: ICD-10-CM

## 2021-03-24 DIAGNOSIS — J98.11 ATELECTASIS: ICD-10-CM

## 2021-03-24 DIAGNOSIS — N17.9 AKI (ACUTE KIDNEY INJURY) (HCC): ICD-10-CM

## 2021-03-24 DIAGNOSIS — E78.5 HYPERLIPIDEMIA: ICD-10-CM

## 2021-03-24 DIAGNOSIS — J90 PLEURAL EFFUSION: ICD-10-CM

## 2021-03-24 DIAGNOSIS — R09.02 HYPOXIA: Primary | ICD-10-CM

## 2021-03-24 DIAGNOSIS — R79.89 ELEVATED BRAIN NATRIURETIC PEPTIDE (BNP) LEVEL: ICD-10-CM

## 2021-03-24 DIAGNOSIS — Z01.89 ENCOUNTER FOR GERIATRIC ASSESSMENT: Primary | ICD-10-CM

## 2021-03-24 LAB
ALBUMIN SERPL BCP-MCNC: 2.9 G/DL (ref 3.5–5)
ALP SERPL-CCNC: 93 U/L (ref 46–116)
ALT SERPL W P-5'-P-CCNC: 11 U/L (ref 12–78)
ANION GAP SERPL CALCULATED.3IONS-SCNC: 4 MMOL/L (ref 4–13)
AST SERPL W P-5'-P-CCNC: 7 U/L (ref 5–45)
ATRIAL RATE: 69 BPM
BASOPHILS # BLD AUTO: 0.09 THOUSANDS/ΜL (ref 0–0.1)
BASOPHILS NFR BLD AUTO: 1 % (ref 0–1)
BILIRUB SERPL-MCNC: 0.53 MG/DL (ref 0.2–1)
BUN SERPL-MCNC: 22 MG/DL (ref 5–25)
CALCIUM ALBUM COR SERPL-MCNC: 9.9 MG/DL (ref 8.3–10.1)
CALCIUM SERPL-MCNC: 9 MG/DL (ref 8.3–10.1)
CHLORIDE SERPL-SCNC: 107 MMOL/L (ref 100–108)
CO2 SERPL-SCNC: 31 MMOL/L (ref 21–32)
CREAT SERPL-MCNC: 1.51 MG/DL (ref 0.6–1.3)
EOSINOPHIL # BLD AUTO: 0.23 THOUSAND/ΜL (ref 0–0.61)
EOSINOPHIL NFR BLD AUTO: 3 % (ref 0–6)
ERYTHROCYTE [DISTWIDTH] IN BLOOD BY AUTOMATED COUNT: 15.8 % (ref 11.6–15.1)
FLUAV RNA RESP QL NAA+PROBE: NEGATIVE
FLUBV RNA RESP QL NAA+PROBE: NEGATIVE
GFR SERPL CREATININE-BSD FRML MDRD: 29 ML/MIN/1.73SQ M
GLUCOSE SERPL-MCNC: 208 MG/DL (ref 65–140)
HCT VFR BLD AUTO: 38.1 % (ref 34.8–46.1)
HGB BLD-MCNC: 11.2 G/DL (ref 11.5–15.4)
IMM GRANULOCYTES # BLD AUTO: 0.03 THOUSAND/UL (ref 0–0.2)
IMM GRANULOCYTES NFR BLD AUTO: 0 % (ref 0–2)
LYMPHOCYTES # BLD AUTO: 1.85 THOUSANDS/ΜL (ref 0.6–4.47)
LYMPHOCYTES NFR BLD AUTO: 22 % (ref 14–44)
MCH RBC QN AUTO: 26.4 PG (ref 26.8–34.3)
MCHC RBC AUTO-ENTMCNC: 29.4 G/DL (ref 31.4–37.4)
MCV RBC AUTO: 90 FL (ref 82–98)
MONOCYTES # BLD AUTO: 0.94 THOUSAND/ΜL (ref 0.17–1.22)
MONOCYTES NFR BLD AUTO: 11 % (ref 4–12)
NEUTROPHILS # BLD AUTO: 5.12 THOUSANDS/ΜL (ref 1.85–7.62)
NEUTS SEG NFR BLD AUTO: 63 % (ref 43–75)
NRBC BLD AUTO-RTO: 0 /100 WBCS
NT-PROBNP SERPL-MCNC: 1021 PG/ML
P AXIS: 62 DEGREES
PLATELET # BLD AUTO: 620 THOUSANDS/UL (ref 149–390)
PMV BLD AUTO: 9.9 FL (ref 8.9–12.7)
POTASSIUM SERPL-SCNC: 4.4 MMOL/L (ref 3.5–5.3)
PR INTERVAL: 172 MS
PROT SERPL-MCNC: 7.5 G/DL (ref 6.4–8.2)
QRS AXIS: -5 DEGREES
QRSD INTERVAL: 96 MS
QT INTERVAL: 404 MS
QTC INTERVAL: 432 MS
RBC # BLD AUTO: 4.25 MILLION/UL (ref 3.81–5.12)
RSV RNA RESP QL NAA+PROBE: NEGATIVE
SARS-COV-2 RNA RESP QL NAA+PROBE: NEGATIVE
SODIUM SERPL-SCNC: 142 MMOL/L (ref 136–145)
T WAVE AXIS: 39 DEGREES
TROPONIN I SERPL-MCNC: <0.02 NG/ML
TROPONIN I SERPL-MCNC: <0.02 NG/ML
VENTRICULAR RATE: 69 BPM
WBC # BLD AUTO: 8.26 THOUSAND/UL (ref 4.31–10.16)

## 2021-03-24 PROCEDURE — 99309 SBSQ NF CARE MODERATE MDM 30: CPT | Performed by: PHYSICIAN ASSISTANT

## 2021-03-24 PROCEDURE — 96360 HYDRATION IV INFUSION INIT: CPT

## 2021-03-24 PROCEDURE — 71275 CT ANGIOGRAPHY CHEST: CPT

## 2021-03-24 PROCEDURE — 93010 ELECTROCARDIOGRAM REPORT: CPT | Performed by: INTERNAL MEDICINE

## 2021-03-24 PROCEDURE — 84484 ASSAY OF TROPONIN QUANT: CPT | Performed by: EMERGENCY MEDICINE

## 2021-03-24 PROCEDURE — 36415 COLL VENOUS BLD VENIPUNCTURE: CPT | Performed by: EMERGENCY MEDICINE

## 2021-03-24 PROCEDURE — 0241U HB NFCT DS VIR RESP RNA 4 TRGT: CPT | Performed by: EMERGENCY MEDICINE

## 2021-03-24 PROCEDURE — 99284 EMERGENCY DEPT VISIT MOD MDM: CPT

## 2021-03-24 PROCEDURE — 80053 COMPREHEN METABOLIC PANEL: CPT | Performed by: EMERGENCY MEDICINE

## 2021-03-24 PROCEDURE — 83880 ASSAY OF NATRIURETIC PEPTIDE: CPT | Performed by: EMERGENCY MEDICINE

## 2021-03-24 PROCEDURE — 96361 HYDRATE IV INFUSION ADD-ON: CPT

## 2021-03-24 PROCEDURE — 93005 ELECTROCARDIOGRAM TRACING: CPT

## 2021-03-24 PROCEDURE — 99284 EMERGENCY DEPT VISIT MOD MDM: CPT | Performed by: EMERGENCY MEDICINE

## 2021-03-24 PROCEDURE — 85025 COMPLETE CBC W/AUTO DIFF WBC: CPT | Performed by: EMERGENCY MEDICINE

## 2021-03-24 RX ADMIN — IODIXANOL 85 ML: 320 INJECTION, SOLUTION INTRAVASCULAR at 15:30

## 2021-03-24 RX ADMIN — SODIUM CHLORIDE 500 ML: 0.9 INJECTION, SOLUTION INTRAVENOUS at 13:50

## 2021-03-24 NOTE — ED PROVIDER NOTES
History  Chief Complaint   Patient presents with    Medical Problem     Pt brought in by EMS from [facility]  Facility sent pt in for a CT scan and PE study  Pt received "some sort of shot" today but does not know what it was  Staff called EMS for repiratory complaints but pt has no complaints  80year old female presents with reportedly low oxygen saturation level from rehab facility  Per EMS was sent in for PE rule out scan  Has been in rehab since recent admission for SBO treated non-operatively  Patient has no complaints at this time including any chest pain, abdominal pain, shortness of breath, cough, fever, chills, nausea, vomiting, diarrhea, bloody or dark stool, urinary symptoms  Spoke with daughter who received call from rehab confirming above  Daughter endorses some mild cognitive impairment  Plans to have mother back home after rehab stint  Patient denies PMH  Diabetes and hypertension per chart  Patient admits she is a "bad patient" in terms of her diabetic management  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  Taking?   acetaminophen (TYLENOL) 325 mg tablet   No No   Sig: Take 2 tablets (650 mg total) by mouth every 6 (six) hours as needed for mild pain   amLODIPine (NORVASC) 10 mg tablet   Yes No   Sig: Take 1 tablet by mouth daily   carvedilol (COREG) 25 mg tablet   Yes No   Sig: Take 1 tablet by mouth 2 (two) times a day with meals   docusate sodium (COLACE) 100 mg capsule   Yes No   Sig: Take 1 capsule by mouth daily   famotidine (PEPCID) 20 mg tablet   Yes No   Sig: Take 1 tablet by mouth 2 (two) times a day   glimepiride (AMARYL) 1 mg tablet   No No   Sig: Take 1 tablet (1 mg total) by mouth daily with breakfast   lisinopril (ZESTRIL) 10 mg tablet   Yes No   Sig: Take 1 tablet by mouth daily   meclizine (ANTIVERT) 12 5 MG tablet   Yes No Sig: Take 12 5 mg by mouth 2 (two) times a day   naproxen (NAPROSYN) 500 mg tablet   No No   Sig: Take 1 tablet (500 mg total) by mouth every 12 (twelve) hours as needed for mild pain or moderate pain   pravastatin (PRAVACHOL) 10 mg tablet   Yes No   Sig: Take 1 tablet by mouth daily   prednisoLONE acetate (PRED FORTE) 1 % ophthalmic suspension   Yes No   Sig: Administer 1 drop into the left eye daily   senna-docusate sodium (SENOKOT S) 8 6-50 mg per tablet   No No   Sig: Take 1 tablet by mouth daily at bedtime   sitaGLIPtin (JANUVIA) 50 mg tablet   Yes No   Sig: Take 1 tablet by mouth daily   timolol (BETIMOL) 0 5 % ophthalmic solution   Yes No   Sig: Administer 1 drop into the left eye daily at bedtime      Facility-Administered Medications: None       Past Medical History:   Diagnosis Date    Basal cell carcinoma     Diabetes mellitus ([email or web address]     [place] (hard of hearing) 03/15/2021    Hypertension     Migraine     Pneumonia due to infectious organism 11/12/2018    Small bowel obstruction ([email or web address] ) 03/15/2021       Past Surgical History:   Procedure Laterality Date    HIP SURGERY      ORIF Left Hip       Family History   Problem Relation Age of Onset    Heart disease Brother     Arthritis Brother     Diabetes Son     Arthritis Son      I have reviewed and agree with the history as documented  E-Cigarette/Vaping    E-Cigarette Use Never User      E-Cigarette/Vaping Substances     Social History     Tobacco Use    Smoking status: Never Smoker    Smokeless tobacco: Never Used   Substance Use Topics    Alcohol use: Never     Frequency: Never     Binge frequency: Never    Drug use: No        Review of Systems   Constitutional: Negative for chills and fever  HENT: Negative for ear pain, sinus pain and sore throat  Eyes: Negative for pain  Respiratory: Negative for shortness of breath  Cardiovascular: Negative for chest pain     Gastrointestinal: Negative for abdominal pain, diarrhea, nausea and vomiting  Genitourinary: Negative for difficulty urinating and flank pain  Musculoskeletal: Negative for back pain and neck pain  Neurological: Negative for headaches  All other systems reviewed and are negative  Physical Exam  ED Triage Vitals [03/24/21 1301]   Temp Pulse Respirations Blood Pressure SpO2   -- 68 18 132/66 92 %      Temp src Heart Rate Source Patient Position - Orthostatic VS BP Location FiO2 (%)   -- Monitor Lying Right arm --      Pain Score       --             Orthostatic Vital Signs  Vitals:    03/24/21 1301 03/24/21 1512 03/24/21 1703 03/24/21 1730   BP: 132/66 151/78 141/66 139/67   Pulse: 68 66 69 66   Patient Position - Orthostatic VS: Lying Lying Lying Lying       Physical Exam  Vitals signs and nursing note reviewed  Constitutional:       General: She is not in acute distress  Appearance: She is well-developed  She is not toxic-appearing or diaphoretic  Comments: Frail appearing   HENT:      Head: Normocephalic  Nose: Nose normal       Mouth/Throat:      Mouth: Mucous membranes are moist    Eyes:      General:         Right eye: No discharge  Conjunctiva/sclera: Conjunctivae normal       Comments: Left corneal scar   Neck:      Musculoskeletal: Normal range of motion  Cardiovascular:      Rate and Rhythm: Normal rate and regular rhythm  Pulmonary:      Effort: Pulmonary effort is normal  No respiratory distress  Breath sounds: No stridor  Rales (mild more prominent at left base) present  Abdominal:      General: There is no distension  Palpations: Abdomen is soft  Tenderness: There is no abdominal tenderness  There is no guarding or rebound  Skin:     General: Skin is warm and dry  Capillary Refill: Capillary refill takes less than 2 seconds  Neurological:      Mental Status: She is alert and oriented to person, place, and time  Cranial Nerves: No cranial nerve deficit     Psychiatric:         Mood and Affect: Mood normal          Behavior: Behavior normal       Comments: Pleasant, cooperative           ED Medications  Medications   sodium chloride 0 9 % bolus 500 mL (0 mL Intravenous Stopped 3/24/21 1657)   iodixanol (VISIPAQUE) 320 MG/ML injection 85 mL (85 mL Intravenous Given 3/24/21 1530)       Diagnostic Studies  Results Reviewed     Procedure Component Value Units Date/Time    Troponin I repeat in 3hrs [[id number]]  (Normal) Collected: 03/24/21 1700    Lab Status: Final result Specimen: Blood from Arm, Right Updated: 03/24/21 1727     Troponin I <0 02 ng/mL     NT-BNP PRO [[id number]]  (Abnormal) Collected: 03/24/21 1349    Lab Status: Final result Specimen: Blood from Arm, Right Updated: 03/24/21 1639     NT-proBNP 1,021 pg/mL     COVID19, Influenza A/B, RSV PCR, [facility] [[id number]]  (Normal) Collected: 03/24/21 1349    Lab Status: Final result Specimen: Nares from Nasopharyngeal Swab Updated: 03/24/21 1455     SARS-CoV-2 Negative     INFLUENZA A PCR Negative     INFLUENZA B PCR Negative     RSV PCR Negative    Narrative: This test has been authorized by FDA under an EUA (Emergency Use Assay) for use by authorized laboratories  Clinical caution and judgement should be used with the interpretation of these results with consideration of the clinical impression and other laboratory testing  Testing reported as "Positive" or "Negative" has been proven to be accurate according to standard laboratory validation requirements  All testing is performed with control materials showing appropriate reactivity at standard intervals      Comprehensive metabolic panel [[id number]]  (Abnormal) Collected: 03/24/21 1349    Lab Status: Final result Specimen: Blood from Arm, Right Updated: 03/24/21 1435     Sodium 142 mmol/L      Potassium 4 4 mmol/L      Chloride 107 mmol/L      CO2 31 mmol/L      ANION GAP 4 mmol/L      BUN 22 mg/dL      Creatinine 1 51 mg/dL      Glucose 208 mg/dL      Calcium 9 0 mg/dL      Corrected Calcium 9 9 mg/dL      AST 7 U/L      ALT 11 U/L      Alkaline Phosphatase 93 U/L      Total Protein 7 5 g/dL      Albumin 2 9 g/dL      Total Bilirubin 0 53 mg/dL      eGFR 29 ml/min/1 73sq m     Narrative:      [place] guidelines for Chronic Kidney Disease (CKD):     Stage 1 with normal or high GFR (GFR > 90 mL/min/1 73 square meters)    Stage 2 Mild CKD (GFR = 60-89 mL/min/1 73 square meters)    Stage 3A Moderate CKD (GFR = 45-59 mL/min/1 73 square meters)    Stage 3B Moderate CKD (GFR = 30-44 mL/min/1 73 square meters)    Stage 4 Severe CKD (GFR = 15-29 mL/min/1 73 square meters)    Stage 5 End Stage CKD (GFR <15 mL/min/1 73 square meters)  Note: GFR calculation is accurate only with a steady state creatinine    Troponin I [[id number]]  (Normal) Collected: 03/24/21 1349    Lab Status: Final result Specimen: Blood from Arm, Right Updated: 03/24/21 1435     Troponin I <0 02 ng/mL     CBC and differential [[id number]]  (Abnormal) Collected: 03/24/21 1349    Lab Status: Final result Specimen: Blood from Arm, Right Updated: 03/24/21 1408     WBC 8 26 Thousand/uL      RBC 4 25 Million/uL      Hemoglobin 11 2 g/dL      Hematocrit 38 1 %      MCV 90 fL      MCH 26 4 pg      MCHC 29 4 g/dL      RDW 15 8 %      MPV 9 9 fL      Platelets 428 Thousands/uL      nRBC 0 /100 WBCs      Neutrophils Relative 63 %      Immat GRANS % 0 %      Lymphocytes Relative 22 %      Monocytes Relative 11 %      Eosinophils Relative 3 %      Basophils Relative 1 %      Neutrophils Absolute 5 12 Thousands/µL      Immature Grans Absolute 0 03 Thousand/uL      Lymphocytes Absolute 1 85 Thousands/µL      Monocytes Absolute 0 94 Thousand/µL      Eosinophils Absolute 0 23 Thousand/µL      Basophils Absolute 0 09 Thousands/µL                  CTA ED chest PE study   Final Result by Nena Morris MD (03/24 1606)   No pulmonary embolism       Bilateral moderate effusion right greater than left with reflux of contrast into IVC, evaluate for heart failure      Mild enlargement of the mediastinal lymph nodes as compared to the study from 2018 likely reactive      Compressive atelectasis both lower lobes      Linear opacities right lower lobe compatible with atelectasis      Atrophic pancreas with calcification and pancreatic ductal dilation as seen on the previous study of 2018 related to chronic pancreatitis               Workstation performed: [id number]               Procedures  Procedures      ED Course  ED Course as of Mar 25 1124   Wed Mar 24, 2021   1515 EKG shows NSR, rate of 69, left axis, normal intervals, no obvious ST elevations or depressions  Overall no significant changes compared to prior  SBIRT 22yo+      Most Recent Value   SBIRT (24 yo +)   In order to provide better care to our patients, we are screening all of our patients for alcohol and drug use  Would it be okay to ask you these screening questions?   Unable to answer at this time Filed at: 03/24/2021 1501          Wells' Criteria for PE      Most Recent Value   Wells' Criteria for PE   Clinical signs and symptoms of DVT  0 Filed at: 03/24/2021 1331   PE is primary diagnosis or equally likely  3 Filed at: 03/24/2021 1331   HR >100  0 Filed at: 03/24/2021 1331   Immobilization at least 3 days or Surgery in the previous 4 weeks  1 5 Filed at: 03/24/2021 1331   Previous, objectively diagnosed PE or DVT  0 Filed at: 03/24/2021 1331   Hemoptysis  0 Filed at: 03/24/2021 1331   Malignancy with treatment within 6 months or palliative  0 Filed at: 03/24/2021 1331   Wells' Criteria Total  4 5 Filed at: 03/24/2021 1331            MDM  Number of Diagnoses or Management Options  JOSÉ MIGUEL (acute kidney injury) ([facility] ):   Atelectasis:   Elevated brain natriuretic peptide (BNP) level:   Hyperlipidemia:   Hypertension, unspecified type:   Hypoxia:   Pleural effusion:   Type 2 diabetes mellitus without complication, without long-term current use of insulin [facility]):   Diagnosis management comments: Labs and scan  Including delta troponin  Add on cardiac bnp  Discussed with patient and daughter JOSÉ MIGUEL, bnp elevation, scan findings  Recommend admission but patient wants to return to rehab  Respect patient's preference and decision making capacity and daughter is in agreement  Cardiology follow up  PCP follow-up, return precautions discussed  Disposition  Final diagnoses:   Hypoxia   Type 2 diabetes mellitus without complication, without long-term current use of insulin ([facility])   Hyperlipidemia   Hypertension, unspecified type   Elevated brain natriuretic peptide (BNP) level   JOSÉ MIGUEL (acute kidney injury) ([facility] )   Pleural effusion   Atelectasis     Time reflects when diagnosis was documented in both MDM as applicable and the Disposition within this note     Time User Action Codes Description Comment    3/24/2021  6:07 PM Jr Finger Add [R09 02] Hypoxia     3/24/2021  6:07 PM Jr Finger Add [E11 9] Type 2 diabetes mellitus without complication, without long-term current use of insulin ([facility] )     3/24/2021  6:07 PM Jr Finger Add [E78 5] Hyperlipidemia     3/24/2021  6:07 PM Aletta Ridge T Add [I10] Hypertension, unspecified type     3/24/2021  6:08 PM Aletta Ridge T Add [R79 89] Elevated brain natriuretic peptide (BNP) level     3/24/2021  6:08 PM Jr Finger Add [N17 9] JOSÉ MIGUEL (acute kidney injury) ([facility] )     3/24/2021  6:10 PM Jr Finger Add [J90] Pleural effusion     3/24/2021  6:10 PM Jr Finger Add [U84 87] Atelectasis       ED Disposition     ED Disposition Condition Date/Time Comment    Discharge Stable Wed Mar 24, 2021  6:06 PM [address] [place] [address] discharge to home/self care              Follow-up Information     Follow up With Specialties Details Why Contact Info Additional Information    Jessenia Moes, DO  Schedule an appointment as soon as possible for a visit  For follow up  [address] [facility] Emergency Department Emergency Medicine Go to  If symptoms worsen [address]  [address] [facility] [address], [address], [place], [place], [place] [address]    [address] [facility] Schedule an appointment as soon as possible for a visit  For follow up   [address], [address] [place], [place], [place], [address]          Discharge Medication List as of 3/24/2021  6:16 PM      CONTINUE these medications which have NOT CHANGED    Details   acetaminophen (TYLENOL) 325 mg tablet Take 2 tablets (650 mg total) by mouth every 6 (six) hours as needed for mild pain, Starting Tue 12/11/2018, No Print      amLODIPine (NORVASC) 10 mg tablet Take 1 tablet by mouth daily, Historical Med      carvedilol (COREG) 25 mg tablet Take 1 tablet by mouth 2 (two) times a day with meals, Historical Med      docusate sodium (COLACE) 100 mg capsule Take 1 capsule by mouth daily, Historical Med      famotidine (PEPCID) 20 mg tablet Take 1 tablet by mouth 2 (two) times a day, Historical Med      glimepiride (AMARYL) 1 mg tablet Take 1 tablet (1 mg total) by mouth daily with breakfast, Starting Mon 11/12/2018, Print      lisinopril (ZESTRIL) 10 mg tablet Take 1 tablet by mouth daily, Historical Med      meclizine (ANTIVERT) 12 5 MG tablet Take 12 5 mg by mouth 2 (two) times a day, Starting Fri 3/22/2019, Historical Med      naproxen (NAPROSYN) 500 mg tablet Take 1 tablet (500 mg total) by mouth every 12 (twelve) hours as needed for mild pain or moderate pain, Starting Sun 12/2/2018, Print      pravastatin (PRAVACHOL) 10 mg tablet Take 1 tablet by mouth daily, Historical Med      prednisoLONE acetate (PRED FORTE) 1 % ophthalmic suspension Administer 1 drop into the left eye daily, Starting Tue 2/10/2015, Historical Med      senna-docusate sodium (SENOKOT S) 8 6-50 mg per tablet Take 1 tablet by mouth daily at bedtime, Starting Tue 12/15/2020, Normal      sitaGLIPtin (JANUVIA) 50 mg tablet Take 1 tablet by mouth daily, Historical Med      timolol (BETIMOL) 0 5 % ophthalmic solution Administer 1 drop into the left eye daily at bedtime, Historical Med               PDMP Review       Value Time User    PDMP Reviewed  Yes 3/19/2021 10:27 AM Real Hutchinson PA-C           ED Provider  Attending physically available and evaluated [place] Screen  I managed the patient along with the ED Attending      Electronically Signed by         Franklin Sarkar MD  03/25/21 7418

## 2021-03-24 NOTE — ED ATTENDING ATTESTATION
3/24/2021  IWilla MD, saw and evaluated the patient  I have discussed the patient with the resident/non-physician practitioner and agree with the resident's/non-physician practitioner's findings, Plan of Care, and MDM as documented in the resident's/non-physician practitioner's note, except where noted  All available labs and Radiology studies were reviewed  I was present for key portions of any procedure(s) performed by the resident/non-physician practitioner and I was immediately available to provide assistance  At this point I agree with the current assessment done in the Emergency Department  I have conducted an independent evaluation of this patient a history and physical is as follows:    ED Course     80year-old female, recent admission to the hospital for small bowel obstruction that was managed non operatively, presenting to the emergency department for evaluation of low oxygen level while she was at rehab facility  Patient herself has no complaints  Patient denies shortness of breath, cough, chest pain  Patient denies new lower extremity pain or swelling  Patient denies abdominal pain, nausea, vomiting, diarrhea  Ten systems reviewed negative except as noted  Vital signs were reviewed  Patient's oxygen saturation varies between 90 to 94% on room air  Head is normocephalic atraumatic  The patient is noted to have left eyelid and eye abnormality which is her baseline  Mucous membranes are dry  Neck is supple without meningismus  Lungs are diminished bilateral bases  No wheezes rales or rhonchi are appreciated  Heart is regular rate rhythm no murmurs rubs or gallops  Abdomen is nondistended, soft and nontender  Extremities are remarkable for some scattered eschars over the left lower extremity  She has trace bilateral lower extremity edema  No calf tenderness  Negative Homans sign  No tenderness over the deep venous system  Patient is awake, alert, oriented  The motor is 5/5 bilateral upper lower extremities  Patient has been previously evaluated at rehab with chest x-ray that is negative by report  Plan is to evaluate the patient for PE  Labs Reviewed   CBC AND DIFFERENTIAL - Abnormal       Result Value Ref Range Status    WBC 8 26  4 31 - 10 16 Thousand/uL Final    RBC 4 25  3 81 - 5 12 Million/uL Final    Hemoglobin 11 2 (*) 11 5 - 15 4 g/dL Final    Hematocrit 38 1  34 8 - 46 1 % Final    MCV 90  82 - 98 fL Final    MCH 26 4 (*) 26 8 - 34 3 pg Final    MCHC 29 4 (*) 31 4 - 37 4 g/dL Final    RDW 15 8 (*) 11 6 - 15 1 % Final    MPV 9 9  8 9 - 12 7 fL Final    Platelets 328 (*) 169 - 390 Thousands/uL Final    nRBC 0  /100 WBCs Final    Neutrophils Relative 63  43 - 75 % Final    Immat GRANS % 0  0 - 2 % Final    Lymphocytes Relative 22  14 - 44 % Final    Monocytes Relative 11  4 - 12 % Final    Eosinophils Relative 3  0 - 6 % Final    Basophils Relative 1  0 - 1 % Final    Neutrophils Absolute 5 12  1 85 - 7 62 Thousands/µL Final    Immature Grans Absolute 0 03  0 00 - 0 20 Thousand/uL Final    Lymphocytes Absolute 1 85  0 60 - 4 47 Thousands/µL Final    Monocytes Absolute 0 94  0 17 - 1 22 Thousand/µL Final    Eosinophils Absolute 0 23  0 00 - 0 61 Thousand/µL Final    Basophils Absolute 0 09  0 00 - 0 10 Thousands/µL Final   COMPREHENSIVE METABOLIC PANEL - Abnormal    Sodium 142  136 - 145 mmol/L Final    Potassium 4 4  3 5 - 5 3 mmol/L Final    Chloride 107  100 - 108 mmol/L Final    CO2 31  21 - 32 mmol/L Final    ANION GAP 4  4 - 13 mmol/L Final    BUN 22  5 - 25 mg/dL Final    Creatinine 1 51 (*) 0 60 - 1 30 mg/dL Final    Comment: Standardized to IDMS reference method    Glucose 208 (*) 65 - 140 mg/dL Final    Comment: If the patient is fasting, the ADA then defines impaired fasting glucose as > 100 mg/dL and diabetes as > or equal to 123 mg/dL    Specimen collection should occur prior to Sulfasalazine administration due to the potential for falsely depressed results  Specimen collection should occur prior to Sulfapyridine administration due to the potential for falsely elevated results  Calcium 9 0  8 3 - 10 1 mg/dL Final    Corrected Calcium 9 9  8 3 - 10 1 mg/dL Final    AST 7  5 - 45 U/L Final    Comment: Specimen collection should occur prior to Sulfasalazine administration due to the potential for falsely depressed results  ALT 11 (*) 12 - 78 U/L Final    Comment: Specimen collection should occur prior to Sulfasalazine and/or Sulfapyridine administration due to the potential for falsely depressed results  Alkaline Phosphatase 93  46 - 116 U/L Final    Total Protein 7 5  6 4 - 8 2 g/dL Final    Albumin 2 9 (*) 3 5 - 5 0 g/dL Final    Total Bilirubin 0 53  0 20 - 1 00 mg/dL Final    Comment: Use of this assay is not recommended for patients undergoing treatment with eltrombopag due to the potential for falsely elevated results  eGFR 29  ml/min/1 73sq m Final    Narrative:     [place] guidelines for Chronic Kidney Disease (CKD):     Stage 1 with normal or high GFR (GFR > 90 mL/min/1 73 square meters)    Stage 2 Mild CKD (GFR = 60-89 mL/min/1 73 square meters)    Stage 3A Moderate CKD (GFR = 45-59 mL/min/1 73 square meters)    Stage 3B Moderate CKD (GFR = 30-44 mL/min/1 73 square meters)    Stage 4 Severe CKD (GFR = 15-29 mL/min/1 73 square meters)    Stage 5 End Stage CKD (GFR <15 mL/min/1 73 square meters)  Note: GFR calculation is accurate only with a steady state creatinine   NT-BNP PRO (BRAIN NATRIURETIC PEPTIDE) - Abnormal    NT-proBNP 1,021 (*) <450 pg/mL Final   COVID19, INFLUENZA A/B, RSV PCR, [facility] - Normal    SARS-CoV-2 Negative  Negative Final    INFLUENZA A PCR Negative  Negative Final    INFLUENZA B PCR Negative  Negative Final    RSV PCR Negative  Negative Final    Narrative: This test has been authorized by FDA under an EUA (Emergency Use Assay) for use by authorized laboratories    Clinical caution and judgement should be used with the interpretation of these results with consideration of the clinical impression and other laboratory testing  Testing reported as "Positive" or "Negative" has been proven to be accurate according to standard laboratory validation requirements  All testing is performed with control materials showing appropriate reactivity at standard intervals  TROPONIN I - Normal    Troponin I <0 02  <=0 04 ng/mL Final    Comment: Siemens Chemistry analyzer 99% cutoff is > 0 04 ng/mL in network labs     o cTnI 99% cutoff is useful only when applied to patients in the clinical setting of myocardial ischemia   o cTnI 99% cutoff should be interpreted in the context of clinical history, ECG findings and possibly cardiac imaging to establish correct diagnosis  o cTnI 99% cutoff may be suggestive but clearly not indicative of a coronary event without the clinical setting of myocardial ischemia  TROPONIN I - Normal    Troponin I <0 02  <=0 04 ng/mL Final    Comment: Siemens Chemistry analyzer 99% cutoff is > 0 04 ng/mL in network labs     o cTnI 99% cutoff is useful only when applied to patients in the clinical setting of myocardial ischemia   o cTnI 99% cutoff should be interpreted in the context of clinical history, ECG findings and possibly cardiac imaging to establish correct diagnosis  o cTnI 99% cutoff may be suggestive but clearly not indicative of a coronary event without the clinical setting of myocardial ischemia           CTA ED chest PE study   Final Result   No pulmonary embolism       Bilateral moderate effusion right greater than left with reflux of contrast into IVC, evaluate for heart failure      Mild enlargement of the mediastinal lymph nodes as compared to the study from 2018 likely reactive      Compressive atelectasis both lower lobes      Linear opacities right lower lobe compatible with atelectasis      Atrophic pancreas with calcification and pancreatic ductal dilation as seen on the previous study of 2018 related to chronic pancreatitis               Workstation performed: [id number]           Patient and daughter were aware of findings of chest CT and lab work demonstrating mild acute kidney injury  Patient is requesting discharge  We will recommend follow-up with cardiology as an outpatient            Critical Care Time  Procedures

## 2021-03-24 NOTE — ED NOTES
BARBARA LUQUE transport back to [facility] scheduled for 2000       Chantel Wells, LAMONT  03/24/21 [id number]

## 2021-03-24 NOTE — DISCHARGE INSTRUCTIONS
Your lab work showed acute kidney injury, elevated cardiac BNP and pleural effusions  You  likely have some degree of heart failure  It is recommended that you follow-up with a cardiologist, for which we have entered an ambulatory referral  We recommended that you stay in the hospital for observation, but respect your wish to return to rehab

## 2021-03-24 NOTE — PROGRESS NOTES
[address]  [phone] U[address]  Code 31      NAME: Ayleen Redd  AGE: 80 y o  SEX: female [id number]    DATE OF ENCOUNTER: 3/24/2021    CODE STATUS: DNR    Assessment and Plan     Problem List Items Addressed This Visit        Respiratory    Hypoxia - Primary     No prior pulmonary diagnosis  Was not on oxygen prior to this hospitalization  Recent admit for SBO, now here for short term rehab  Feels generally weak and fatigued  Now tolerating a regular diet but was NPO for days in the hospital  Getting PT here  Was found to be hypoxic in the 80's on RA, improved to 90's on 2L nasal cannula  Denies SOB  No chest pain  Ordered CXR, no pneumonia, no CHF  Will repeat blood work for AM  Likely component of general physical deconditioning  Will continue to monitor closely  No orders of the defined types were placed in this encounter  Chief Complaint   No chief complaint on file  History of Present Illness   80year old female being seen today in collaboration with nursing for hypoxia  Nursing states she was noted to have hypoxia but was asymptomatic  Patient only complains of feeling generally tired and fatigued since her recent hospitalization for SBO  She was NPO for a few days in the hospital  No fever  No SOB or chest pain  She is eating well now  She was living at home independently until recently  She was not on oxygen at home  The following portions of the patient's history were reviewed and updated as appropriate: allergies, current medications, past family history, past medical history, past social history, past surgical history and problem list     Review of Systems   Review of Systems   Constitutional: Positive for fatigue  Negative for chills and fever  Neurological: Positive for dizziness            Active Problem List     Patient Active Problem List   Diagnosis    Type 2 diabetes mellitus without complication, without long-term current use of insulin ([facility] )    Hypertension    Meningioma ([facility] )    Open wound of left lower leg    [facility]) [facility])    Goals of care, counseling/discussion    Polymyalgia rheumatica ([facility])    GERD with esophagitis    Depressive disorder    Mesenteric vascular insufficiency ([facility])    Blind left eye    Spinal stenosis    Hyperlipidemia    Benign paroxysmal positional vertigo    Pernicious anemia    Medication management    Thrombocytosis ([facility] )    Hypoxia         Objective     There were no vitals taken for this visit  Physical Exam  Vitals signs reviewed  Constitutional:       General: She is not in acute distress  Appearance: She is normal weight  She is not ill-appearing or diaphoretic  Cardiovascular:      Rate and Rhythm: Normal rate and regular rhythm  Pulmonary:      Effort: Pulmonary effort is normal  No respiratory distress  Breath sounds: No wheezing or rales  Abdominal:      General: Abdomen is flat  There is no distension  Palpations: Abdomen is soft  Tenderness: There is no abdominal tenderness  Musculoskeletal:         General: No swelling  Comments: No calf swelling or tenderness   Skin:     General: Skin is warm and dry  Coloration: Skin is not jaundiced  Findings: No erythema  Neurological:      Mental Status: She is alert and oriented to person, place, and time  Psychiatric:         Mood and Affect: Mood normal          Behavior: Behavior normal          Pertinent Laboratory/Diagnostic Studies:    CXR-no pneumonia or CHF    Current Medications   Medications reviewed and updated in facility chart

## 2021-03-24 NOTE — ASSESSMENT & PLAN NOTE
No prior pulmonary diagnosis  Was not on oxygen prior to this hospitalization  Recent admit for SBO, now here for short term rehab  Feels generally weak and fatigued  Now tolerating a regular diet but was NPO for days in the hospital  Getting PT here  Was found to be hypoxic in the 80's on RA, improved to 90's on 2L nasal cannula  Denies SOB  No chest pain  Ordered CXR, no pneumonia, no CHF  Will repeat blood work for AM  Likely component of general physical deconditioning  Will continue to monitor closely

## 2021-03-26 ENCOUNTER — NURSING HOME VISIT (OUTPATIENT)
Dept: GERIATRICS | Facility: OTHER | Age: 86
End: 2021-03-26
Payer: MEDICARE

## 2021-03-26 VITALS
TEMPERATURE: 98.9 F | DIASTOLIC BLOOD PRESSURE: 67 MMHG | BODY MASS INDEX: 24.1 KG/M2 | WEIGHT: 144.8 LBS | HEART RATE: 78 BPM | OXYGEN SATURATION: 98 % | RESPIRATION RATE: 20 BRPM | SYSTOLIC BLOOD PRESSURE: 154 MMHG

## 2021-03-26 DIAGNOSIS — N17.9 AKI (ACUTE KIDNEY INJURY) (HCC): Primary | ICD-10-CM

## 2021-03-26 PROCEDURE — 99308 SBSQ NF CARE LOW MDM 20: CPT | Performed by: PHYSICIAN ASSISTANT

## 2021-03-26 NOTE — ASSESSMENT & PLAN NOTE
Recently sent to ED for hypoxia  Had CT/PE study which was negative  sats improved  Creat was 1 5 in hospital  Admission was recommended but patient refused  Creatinine today 1 26  Continue to encourage PO fluids  Avoid nephrotoxins  Repeat blood work ordered for next week  Will follow

## 2021-03-29 ENCOUNTER — NURSING HOME VISIT (OUTPATIENT)
Dept: GERIATRICS | Facility: OTHER | Age: 86
End: 2021-03-29
Payer: MEDICARE

## 2021-03-29 DIAGNOSIS — R26.2 AMBULATORY DYSFUNCTION: ICD-10-CM

## 2021-03-29 DIAGNOSIS — I10 HYPERTENSION, UNSPECIFIED TYPE: ICD-10-CM

## 2021-03-29 DIAGNOSIS — J90 PLEURAL EFFUSION, BILATERAL: ICD-10-CM

## 2021-03-29 DIAGNOSIS — K56.609 SBO (SMALL BOWEL OBSTRUCTION) (HCC): Primary | ICD-10-CM

## 2021-03-29 DIAGNOSIS — K21.00 GASTROESOPHAGEAL REFLUX DISEASE WITH ESOPHAGITIS, UNSPECIFIED WHETHER HEMORRHAGE: ICD-10-CM

## 2021-03-29 DIAGNOSIS — N17.9 AKI (ACUTE KIDNEY INJURY) (HCC): ICD-10-CM

## 2021-03-29 DIAGNOSIS — E11.9 TYPE 2 DIABETES MELLITUS WITHOUT COMPLICATION, WITHOUT LONG-TERM CURRENT USE OF INSULIN (HCC): Chronic | ICD-10-CM

## 2021-03-29 DIAGNOSIS — D75.839 THROMBOCYTOSIS: ICD-10-CM

## 2021-03-29 PROCEDURE — 99309 SBSQ NF CARE MODERATE MDM 30: CPT | Performed by: NURSE PRACTITIONER

## 2021-03-29 NOTE — ASSESSMENT & PLAN NOTE
Improved renal functions (3/26/2021)  Nursing to continue to monitor oral fluid and meal completion  BMP on 4/2/2021

## 2021-03-29 NOTE — ASSESSMENT & PLAN NOTE
As seen on imaging (CT scan 3/24/2021): Moderate, R>L  Possibly related to CHF:  - cardiomegaly  - Elevated BNP  Continue O2 supplement  Continue Duo-neb TID to complete 5 days  Will consider starting daily diuretics if persistent SOB  Will repeat BNP level in 1 week: 4/2/2021  Will continue for now

## 2021-03-29 NOTE — ASSESSMENT & PLAN NOTE
Lab Results   Component Value Date    HGBA1C 6 8 (H) 12/11/2020   Goal: < 8%  FBG range (3//2021) = 84 to 154  Continue Glimepiride 1mg daily + Januvia 50mg daily

## 2021-03-29 NOTE — ASSESSMENT & PLAN NOTE
BP range (3/20-29/2021) = 112/47 to 162/69  HR range (3/20-29/2021) = 62 to 80/min  Continue the following meds:  * Amlodipine 10mg daily  * Carvedilol 25mg BID  * Lisinopril 10mg daily  Will continue to monitor for now

## 2021-03-29 NOTE — ASSESSMENT & PLAN NOTE
Improved Platelet level (8/57/6263)  Current Platelet: 844 (9/43/2115) <= 620 (H: 3/24/2021)  CBC with diff and CMP on 3/31/2021  Will continue to monitor for now

## 2021-03-29 NOTE — PROGRESS NOTES
Progress Note    Location: [facility]  POS: 31 (SNF)    Assessment/Plan:    SBO (small bowel obstruction) ([facility] [address] )  Deemed resolved prior to admission to [facility]  Review of BM log showed daily bowel movements  Review of meal and oral fluid completion: % per meal  Will continue to monitor for now  Pleural effusion, bilateral  As seen on imaging (CT scan 3/24/2021): Moderate, R>L  Possibly related to CHF:  - cardiomegaly  - Elevated BNP  Continue O2 supplement  Continue Duo-neb TID to complete 5 days  Will consider starting daily diuretics if persistent SOB  Will repeat BNP level in 1 week: 4/2/2021  Will continue for now  Type 2 diabetes mellitus without complication, without long-term current use of insulin ([facility])    Lab Results   Component Value Date    HGBA1C 6 8 (H) 12/11/2020   Goal: < 8%  FBG range (3//2021) = 84 to 154  Continue Glimepiride 1mg daily + Januvia 50mg daily    JOSÉ MIGUEL (acute kidney injury) ([facility] [address] )  Improved renal functions (3/26/2021)  Nursing to continue to monitor oral fluid and meal completion  BMP on 4/2/2021  Hypertension  BP range (3/20-29/2021) = 112/47 to 162/69  HR range (3/20-29/2021) = 62 to 80/min  Continue the following meds:  * Amlodipine 10mg daily  * Carvedilol 25mg BID  * Lisinopril 10mg daily  Will continue to monitor for now  GERD with esophagitis  Continue Famotidine 20mg BID    Thrombocytosis ([facility])  Improved Platelet level (1/93/4713)  Current Platelet: 690 (0/06/0666) <= 620 (H: 3/24/2021)  CBC with diff and CMP on 3/31/2021  Will continue to monitor for now  Ambulatory dysfunction  Continue 24/7 [facility] supportive care and management  Continue PT/OT/ST as scheduled  Fall precaution       Chief complaint / Reason for visit: Follow-up visit    History of Present Illness: This is a 80 y o  Female patient currently admitted at [facility] (3/19/2021 to present) following discharge from acute care hospitalization [facility]) with Dx of Small Bowel Obstruction       [facility] sudden onset SOB with associated hypoxia  CT scan of chest with IV done showed Bilateral moderate pleural effusion R>L  No PE seen  Patient also Dx with JOSÉ MIGUEL and elevated BP level  Patient returned to facility on the same day  Patient is seen and examined today to follow-up acute and chronic medical conditions as mentioned above and DM Type 2, HTN, GERD and ambulatory dysfunction  Patient is in bed for this visit - sleepy but easily awakened - inconsistently participating,stated, " I'm tired"  Limited ROS assessment on this visit - denies pain, SOB, chest pain  Per nursing, this is baseline for patient, " she does not engaged in conversation, poorly participating in therapy session but the one time I accompanied her for an out-side appointment, patient was verbally engaged, clear coherent speech and talking up a storm with regards to her past history, about family but as soon as we got back from the appointment, patient resumed back to being uncommunicative status"  Spoke with patient's daughter - updated on visit today, discussed most recent lab results  Questions answered  Daughter acknowledged that patient usually "perks up" later in the afternoon  Review of Systems:  Per history of present illness, all other systems reviewed and negative      HISTORY:  Medical Hx: Reviewed, unchanged  Family Hx: Reviewed, unchanged  Soc Hx: Reviewed,  unchanged    ALLERGY: Reviewed, unchanged  Allergies   Allergen Reactions    Atorvastatin Hives    Fentanyl Other (See Comments)    Hydrocodone-Acetaminophen GI Intolerance    Metronidazole Other (See Comments)    Penicillins Other (See Comments)    Sulfa Antibiotics Other (See Comments)     takes Amaryl @ home,takes Hyzaar @ home    Tramadol Other (See Comments)    Metoclopramide Rash    Oxycodone-Acetaminophen Rash        PHYSICAL EXAM:  Vital Signs: T97 8F -P63 -R18 BP: 123/49 SpO2: 95% 2L/min  Weight: 143 5 lbs (3/29/2021) <= 143 7 lbs (3/27/2021) <= 145 0 lbs (3/20/2021)    Physical Exam  Vitals signs and nursing note reviewed  Constitutional:       General: She is not in acute distress  Appearance: Normal appearance  She is normal weight  She is not ill-appearing, toxic-appearing or diaphoretic  HENT:      Head: Normocephalic and atraumatic  Right Ear: Tympanic membrane, ear canal and external ear normal  There is no impacted cerumen  Left Ear: Tympanic membrane, ear canal and external ear normal  There is no impacted cerumen  Nose: Nose normal  No congestion or rhinorrhea  Mouth/Throat:      Mouth: Mucous membranes are moist       Pharynx: Oropharynx is clear  No oropharyngeal exudate or posterior oropharyngeal erythema  Eyes:      General: No scleral icterus  Right eye: No discharge  Left eye: No discharge  Conjunctiva/sclera: Conjunctivae normal       Pupils: Pupils are equal, round, and reactive to light  Neck:      Musculoskeletal: Normal range of motion and neck supple  No neck rigidity or muscular tenderness  Vascular: No carotid bruit  Cardiovascular:      Rate and Rhythm: Normal rate and regular rhythm  Heart sounds: Normal heart sounds  No murmur  No friction rub  No gallop  Pulmonary:      Effort: Pulmonary effort is normal  No respiratory distress  Breath sounds: No stridor  Rales present  No wheezing or rhonchi  Comments: Scattered moist rales to Right fields, generally diminished breath sounds versus poor inspiratory effort  O2 via nc  No dyspnea noted  Chest:      Chest wall: No tenderness  Abdominal:      General: Bowel sounds are normal  There is no distension  Palpations: Abdomen is soft  There is no mass  Tenderness: There is no abdominal tenderness  There is no right CVA tenderness, left CVA tenderness, guarding or rebound  Hernia: No hernia is present     Genitourinary:     Comments: Non distended bladder  Musculoskeletal:         General: No swelling, tenderness, deformity or signs of injury  Right lower leg: Edema present  Left lower leg: Edema present  Comments: Ambulatory dysfunction  Slight BLE edema - non pitting  Lymphadenopathy:      Cervical: No cervical adenopathy  Skin:     General: Skin is warm and dry  Capillary Refill: Capillary refill takes less than 2 seconds  Coloration: Skin is not jaundiced or pale  Findings: No bruising, erythema, lesion or rash  Comments: Multiple open wounds to BLE on varying degree of resolution, xerosis cutis   Neurological:      Mental Status: She is alert and oriented to person, place, and time  Mental status is at baseline  Psychiatric:      Comments: Sleepy, inconsistently participating during this visit  Laboratory results / Imaging reivewed: Hard copy in medical chart:    * BMP (3/26/2021) = WNL except:  Crea: 1 26 (H) <= 1 31 (H: 3/25/2021) <= 1 51 (H: 3/24/2021: [facility] Epic record)  Co: 32 (H)  GFR: 32 (L)    * CBC with diff (3/26/2021) = WNL except:  Hbg: 10 1 (L) <= 11 2 (L: 3/24/2021)  Hct: 30 6 (L)  <= 38 1 (WNL: 3/24/2021)  RBC: 3 67 (L) <= 4 25 (WNL: 3/24/2021)  Platelet: 740 (H) <= 620 (H: 3/24/2021)  RDW: 16 1 (H) <= 15 8 (L: 3/24/2021)    * CXR (3/23/2021)  = Possible slight left pleural effusion/ thickening vs  soft tissue attenuation  = Slight opacity to Right lung base  = Cardiomegaly    * BNP (3/24/2021) = 1,021 (H)      Current Medications:   All medications reviewed and updated in [address] [place] [address],  [id number]  3/29/2021

## 2021-03-29 NOTE — ASSESSMENT & PLAN NOTE
Deemed resolved prior to admission to [facility]  Review of BM log showed daily bowel movements  Review of meal and oral fluid completion: % per meal  Will continue to monitor for now

## 2021-03-30 DIAGNOSIS — Z23 ENCOUNTER FOR IMMUNIZATION: ICD-10-CM

## 2021-03-31 ENCOUNTER — NURSING HOME VISIT (OUTPATIENT)
Dept: GERIATRICS | Facility: OTHER | Age: 86
End: 2021-03-31
Payer: MEDICARE

## 2021-03-31 DIAGNOSIS — R09.02 HYPOXIA: Primary | ICD-10-CM

## 2021-03-31 PROCEDURE — 99308 SBSQ NF CARE LOW MDM 20: CPT | Performed by: INTERNAL MEDICINE

## 2021-04-01 RX ORDER — FUROSEMIDE 20 MG/1
20 TABLET ORAL DAILY
COMMUNITY
Start: 2021-03-30 | End: 2021-04-04 | Stop reason: ALTCHOICE

## 2021-04-01 RX ORDER — POTASSIUM CHLORIDE 750 MG/1
10 TABLET, EXTENDED RELEASE ORAL DAILY
COMMUNITY
Start: 2021-03-30 | End: 2021-04-04 | Stop reason: ALTCHOICE

## 2021-04-01 NOTE — PROGRESS NOTES
[address]  [address] [place] [address]  Facility: [facility]  31    NAME: Amirah Will  AGE: 80 y o  SEX: female    DATE OF ENCOUNTER: 3/31/2021    Code status:  DNR    Assessment and Plan     1  Hypoxia  Assessment & Plan:  · Nursing and therapy report low pulse oximetry result with ambulation and sometimes at rest   · Nursing reports and patient endorses that she does not feel short of breath  · Evaluation in the emergency room failed to reveal a pulmonary embolus   · Bilateral pleural effusions were seen with some indirect evidence of congestive heart failure  · Reportedly, patient and family did not want to be admitted to the hospital   · I have started her on a trial of diuretic therapy for presumed congestive heart failure  · Will continue with clinical and periodic laboratory monitoring for change in condition        All medications and routine orders were reviewed and updated as needed  Plan discussed with:  Nursing staff  Chief Complaint     She is seen for a visit to update the care and treatment of her hypoxia  History of Present Illness     She is a 59-year-old woman with the comorbidities of type 2 diabetes mellitus, hypertension, and recent hospitalization for a small-bowel obstruction who is seen for a follow-up visit to update the care and treatment of her hypoxia  Currently, she is at the rehabilitation facility after being hospitalized for a small-bowel obstruction that was treated successfully with conservative management  Nursing and therapy report that her pulse oximetry drops below 90% with activity and sometimes at rest   They endorse that she does not complain of shortness of breath  She was seen in the emergency room on March 24, 2021 to rule out pulmonary embolism secondary to her recent hospitalization and decreased mobility  CTA of her chest failed to reveal a pulmonary embolus    However, bilateral pleural effusions were seen with indirect evidence of congestive heart failure  Reportedly, patient and her family declined admission to the hospital for further evaluation and management  Today, she denies chest pain, shortness of breath, and orthopnea  She denies swelling in her legs  She does admit to some shortness of breath with exertion  She notes an overall feeling of lack of energy  The following portions of the patient's history were reviewed and updated as appropriate: current medications, past family history, past medical history, past social history, past surgical history and problem list     Allergies: Allergies   Allergen Reactions    Atorvastatin Hives    Fentanyl Other (See Comments)    Hydrocodone-Acetaminophen GI Intolerance    Metronidazole Other (See Comments)    Penicillins Other (See Comments)    Sulfa Antibiotics Other (See Comments)     takes Amaryl @ home,takes Hyzaar @ home    Tramadol Other (See Comments)    Metoclopramide Rash    Oxycodone-Acetaminophen Rash       Review of Systems     Review of Systems   Constitutional: Positive for fatigue ( see HPI)  Respiratory: Negative for shortness of breath  Cardiovascular: Negative for chest pain and leg swelling  Medications and orders     All medications reviewed and updated in Nursing Home EMR  Objective     Vitals:  Weight 144 2 lb, temperature 98 5° F, pulse 68, respiratory 20, blood pressure 138/52, fasting fingerstick blood sugar 118, pulse oximetry 95% on supplemental oxygen  Physical Exam  Vitals signs reviewed  Constitutional:       General: She is awake  She is not in acute distress  Appearance: She is well-developed  She is not ill-appearing, toxic-appearing or diaphoretic  Comments: She appears comfortable lying flat in bed, her stated age, and frail  HENT:      Mouth/Throat:      Mouth: Mucous membranes are moist    Eyes:      General: No scleral icterus       Conjunctiva/sclera: Conjunctivae normal    Cardiovascular:      Rate and Rhythm: Normal rate and regular rhythm  Heart sounds: Normal heart sounds  No friction rub  No gallop  Comments: There is trace bilateral distal pretibial edema  Pulmonary:      Effort: Pulmonary effort is normal  No respiratory distress  Breath sounds: Normal breath sounds  No stridor  No wheezing or rales  Chest:      Chest wall: No tenderness  Abdominal:      General: Abdomen is flat  There is no distension  Palpations: Abdomen is soft  There is no mass  Tenderness: There is no abdominal tenderness  There is no guarding or rebound  Neurological:      Mental Status: She is alert  Psychiatric:         Mood and Affect: Mood normal          Behavior: Behavior normal  Behavior is cooperative  Pertinent Laboratory/Diagnostic Studies: The following labs were reviewed please see chart or hospital paperwork for details  March 31, 2021:     CBC with diff:  WBC count 7 6, hemoglobin 10 2, hematocrit 12 1, platelet count [id number]     CMP:  Sodium 140, potassium 5, BUN 21, creatinine 1 34, fasting blood sugar 102, EGFR 33, LFTs within normal limits except albumin 2 6 and total protein 6    - Treatment plan reviewed with nursing staff      Maile Babinski, M D   4/1/2021 8:40 AM

## 2021-04-01 NOTE — ASSESSMENT & PLAN NOTE
· Nursing and therapy report low pulse oximetry result with ambulation and sometimes at rest   · Nursing reports and patient endorses that she does not feel short of breath  · Evaluation in the emergency room failed to reveal a pulmonary embolus   · Bilateral pleural effusions were seen with some indirect evidence of congestive heart failure  · Reportedly, patient and family did not want to be admitted to the hospital   · I have started her on a trial of diuretic therapy for presumed congestive heart failure  · Will continue with clinical and periodic laboratory monitoring for change in condition

## 2021-04-02 ENCOUNTER — NURSING HOME VISIT (OUTPATIENT)
Dept: GERIATRICS | Facility: OTHER | Age: 86
End: 2021-04-02
Payer: MEDICARE

## 2021-04-02 DIAGNOSIS — R09.02 HYPOXIA: Primary | ICD-10-CM

## 2021-04-02 DIAGNOSIS — E87.5 HYPERKALEMIA: ICD-10-CM

## 2021-04-02 DIAGNOSIS — R53.83 OTHER FATIGUE: ICD-10-CM

## 2021-04-02 PROCEDURE — 99308 SBSQ NF CARE LOW MDM 20: CPT | Performed by: INTERNAL MEDICINE

## 2021-04-04 PROBLEM — E87.5 HYPERKALEMIA: Status: ACTIVE | Noted: 2021-04-04

## 2021-04-05 NOTE — PROGRESS NOTES
Amish [address]  [address] [place] [address]  Facility: [facility]  31    NAME: Yoselyn Caban  AGE: 80 y o  SEX: female    DATE OF ENCOUNTER: 4/2/2021    Code status:  DNR    Assessment and Plan     1  Hypoxia  Assessment & Plan:  · Nursing and patient endorse no change in symptoms/ pulse oximetry readings with 3 days of trial of diuretic therapy   · Slight worsening of kidney function with mild elevation in creatinine level   · BNP from earlier today within normal limits   · Question related to bilateral pleural effusions  · Will continue with current therapy and re-evaluate in 2-3 days  · Will continue to monitor for change in condition      2  Other fatigue  Assessment & Plan:  · Questions source of her symptom   · She denies feeling depressed  · She notes periodic episodes of this in the past  · Will check follow-up laboratory testing on April 5, 2021 including TSH level   · Will continue with PT/ OT evaluation and treatment to assist her in returning to her prior level function   · Will continue to monitor for change in condition      3  Hyperkalemia  Assessment & Plan:  · Likely secondary to diuretic therapy with supplementation  · Will discontinue both medications   · Will continue with clinical and periodic laboratory monitoring   · Follow-up laboratory testing has been ordered for April 5, 2021        All medications and routine orders were reviewed and updated as needed  Plan discussed with:  Patient and nursing staff  Chief Complaint     She is seen for a follow-up visit to update the care treatment of her hypoxia  History of Present Illness     She is a 80-year-old woman who is seen for a follow-up visit to update the care and treatment of her hypoxia  She has completed a 3 day trial course of diuretic therapy  Nursing endorses that she continues not to participate fully with her therapy    She endorses having a good visit with her family earlier today  She reports a generalized feeling of fatigue and lack of motivation  She denies feeling depressed  She denies chest pain, shortness of breath, and orthopnea  The following portions of the patient's history were reviewed and updated as appropriate: current medications, past family history, past medical history, past social history, past surgical history and problem list     Allergies: Allergies   Allergen Reactions    Atorvastatin Hives    Fentanyl Other (See Comments)    Hydrocodone-Acetaminophen GI Intolerance    Metronidazole Other (See Comments)    Penicillins Other (See Comments)    Sulfa Antibiotics Other (See Comments)     takes Amaryl @ home,takes Hyzaar @ home    Tramadol Other (See Comments)    Metoclopramide Rash    Oxycodone-Acetaminophen Rash       Review of Systems     Review of Systems   Constitutional: Positive for fatigue  Respiratory: Negative for chest tightness and shortness of breath  Cardiovascular: Negative for chest pain and leg swelling  Medications and orders     All medications reviewed and updated in Nursing Home EMR  Objective     Vitals:   Weight 142 lb ( decreased 2 lb), pulse 71, blood pressure 119/51, pulse oximetry 92% on supplemental oxygen  Physical Exam  Constitutional:       General: She is not in acute distress  Appearance: Normal appearance  She is not ill-appearing, toxic-appearing or diaphoretic  Cardiovascular:      Rate and Rhythm: Normal rate and regular rhythm  Heart sounds: No murmur  No friction rub  No gallop  Comments: There is no pretibial edema, bilaterally  Pulmonary:      Effort: Pulmonary effort is normal  No respiratory distress  Breath sounds: Normal breath sounds  No stridor  No wheezing, rhonchi or rales  Neurological:      Mental Status: She is alert     Psychiatric:         Mood and Affect: Mood normal          Behavior: Behavior normal          Pertinent Laboratory/Diagnostic Studies: The following labs were reviewed please see chart or hospital paperwork for details  April 2, 2021:     BMP:  Sodium 139, potassium 5 4, BUN 27, creatinine 1 52, fasting blood sugar 108, EGFR 29     BNP:  73    - Treatment plan reviewed with patient and nursing staff      BECKY Hayes   4/4/2021 11:30 PM

## 2021-04-05 NOTE — ASSESSMENT & PLAN NOTE
· Likely secondary to diuretic therapy with supplementation  · Will discontinue both medications   · Will continue with clinical and periodic laboratory monitoring   · Follow-up laboratory testing has been ordered for April 5, 2021

## 2021-04-05 NOTE — ASSESSMENT & PLAN NOTE
· Questions source of her symptom   · She denies feeling depressed  · She notes periodic episodes of this in the past  · Will check follow-up laboratory testing on April 5, 2021 including TSH level   · Will continue with PT/ OT evaluation and treatment to assist her in returning to her prior level function   · Will continue to monitor for change in condition

## 2021-04-05 NOTE — ASSESSMENT & PLAN NOTE
· Nursing and patient endorse no change in symptoms/ pulse oximetry readings with 3 days of trial of diuretic therapy   · Slight worsening of kidney function with mild elevation in creatinine level   · BNP from earlier today within normal limits   · Question related to bilateral pleural effusions  · Will continue with current therapy and re-evaluate in 2-3 days  · Will continue to monitor for change in condition

## 2021-04-08 ENCOUNTER — NURSING HOME VISIT (OUTPATIENT)
Dept: GERIATRICS | Facility: OTHER | Age: 86
End: 2021-04-08
Payer: MEDICARE

## 2021-04-08 DIAGNOSIS — E11.9 TYPE 2 DIABETES MELLITUS WITHOUT COMPLICATION, WITHOUT LONG-TERM CURRENT USE OF INSULIN (HCC): Chronic | ICD-10-CM

## 2021-04-08 DIAGNOSIS — K56.609 SBO (SMALL BOWEL OBSTRUCTION) (HCC): ICD-10-CM

## 2021-04-08 DIAGNOSIS — N17.9 AKI (ACUTE KIDNEY INJURY) (HCC): Primary | ICD-10-CM

## 2021-04-08 DIAGNOSIS — R53.83 OTHER FATIGUE: ICD-10-CM

## 2021-04-08 DIAGNOSIS — R26.2 AMBULATORY DYSFUNCTION: ICD-10-CM

## 2021-04-08 DIAGNOSIS — K21.00 GASTROESOPHAGEAL REFLUX DISEASE WITH ESOPHAGITIS, UNSPECIFIED WHETHER HEMORRHAGE: ICD-10-CM

## 2021-04-08 DIAGNOSIS — E87.5 HYPERKALEMIA: ICD-10-CM

## 2021-04-08 DIAGNOSIS — I10 HYPERTENSION, UNSPECIFIED TYPE: ICD-10-CM

## 2021-04-08 DIAGNOSIS — J90 PLEURAL EFFUSION, BILATERAL: ICD-10-CM

## 2021-04-08 PROCEDURE — 99309 SBSQ NF CARE MODERATE MDM 30: CPT | Performed by: NURSE PRACTITIONER

## 2021-04-08 NOTE — PROGRESS NOTES
Progress Note    Location: [facility]  POS: 31 (SNF)    Assessment/Plan:    JOSÉ MIGUEL ([facility] [address] )  Improved renal functions (4/8/2021)  Nursing to continue to actively offer oral fluids as tolerated  Pleural effusion, bilateral  As seen on imaging (CT scan 3/24/2021): Moderate, R>L  Patient reported baseline breathing patterns on this visit  Continue O2 supplement via nc  No hypoxia  Will continue for now      Type 2 diabetes mellitus without complication, without long-term current use of insulin ([facility])           Lab Results   Component Value Date     HGBA1C 6 8 (H) 12/11/2020   Goal: < 8%  Stable  FBG range (4/1-8/2021) = 86 to 168  Continue Glimepiride 1mg daily + Januvia 50mg daily  Ave  Meal completion: %   Will continue to monitor     Hypertension  Stable and controlled with current meds:  BP range (4/1-8/2021) = 113/50 to 142/64  HR range (4/1-8/2021) = 67 to 76/min  Continue the following meds:  * Amlodipine 10mg daily  * Carvedilol 25mg BID  * Lisinopril 10mg daily  Will continue to monitor     GERD with esophagitis  Continue Famotidine 20mg BID     Ambulatory dysfunction  Continue 24/7 [facility] supportive care and management  Continue PT/OT/ST as scheduled  Fall precaution     Other Fatigue  TSH WNL (4/5/2021)  Good appetite for lunch and dinner meals: %  Renal functions stable and improved (4/8/2021)  More alert and engaged on this visit  Consult Psychiatry for evaluation and management     SBO (small bowel obstruction) ([facility] [address] )  Deemed resolved prior to admission to [facility]  Daily bowel movements on review    Hyperkalemia  Deemed resolved (4/8/2021)    Chief complaint / Reason for visit: Follow-up visit    History of Present Illness: This is a 80 y o  Female patient currently admitted at [facility] (3/19/2021 to present) following discharge from acute care hospitalization [facility]  [facility]) with Dx of Small Bowel Obstruction       Patient recently transferred to [facility] on 3/24/2021 for sudden onset SOB with associated hypoxia   CT scan of chest with IV done showed Bilateral moderate pleural effusion R>L  No PE seen  Patient also Dx with JOSÉ MIGUEL and elevated BP level  Patient is seen and examined today to follow up acute and chronic medical conditions as mentioned above:  Hypertension, DM type 2, JOSÉ MIGUEL, Persistent Fatigue, Hyperkalemia and ambulatory dysfunction  Patient was initially found to sleep in bed on this visit - easily awakened and is able to maintain wakefulness  Patient is calm cooperative and more engaged than prior visits  Patient denies any acute medical concerns for this visit including pain dizziness headaches or generalized fatigue  Per nursing, patient remains with fatigue like mentation usually improved in the afternoons - patient is also reported to have irregular sleep patterns at night - at baseline wakes up late in the morning mostly past 10:00 a m  TSH and BNP levels are within normal, SpO2: 90-91% on RA - pulse ox improved to 90 2-93% on 2 L of O2 via nasal cannula  Review of prior visits from another provider showed patient is managed for JOSÉ MIGUEL triggered by diuretic use to relieve pulmonary congestion  Renal functions improved as of today's results with hyperkalemia back to normal limits  Will recommend repeat chest imaging to follow-up bilateral pleural effusions status upon discharge from short-term rehab  Review of Systems:  Per history of present illness, all other systems reviewed and negative      HISTORY:  Medical Hx: Reviewed, unchanged  Family Hx: Reviewed, unchanged  Soc Hx: Reviewed,  unchanged    ALLERGY: Reviewed, unchanged  Allergies   Allergen Reactions    Atorvastatin Hives    Fentanyl Other (See Comments)    Hydrocodone-Acetaminophen GI Intolerance    Metronidazole Other (See Comments)    Penicillins Other (See Comments)    Sulfa Antibiotics Other (See Comments)     takes Amaryl @ home,takes Hyzaar @ home    Tramadol Other (See Comments)    Metoclopramide Rash    Oxycodone-Acetaminophen Rash        PHYSICAL EXAM:  Vital Signs: T98 7F -P72 -R18 BP: 132/53 SpO2: 90-92% on 2L/min  Weight: 141 0 lbs (4/8/2021)     Physical Exam  Vitals signs and nursing note reviewed  Constitutional:       General: She is not in acute distress  Appearance: Normal appearance  She is normal weight  She is not ill-appearing, toxic-appearing or diaphoretic  HENT:      Head: Normocephalic and atraumatic  Right Ear: Tympanic membrane, ear canal and external ear normal  There is no impacted cerumen  Left Ear: Tympanic membrane, ear canal and external ear normal  There is no impacted cerumen  Nose: Nose normal  No congestion or rhinorrhea  Mouth/Throat:      Mouth: Mucous membranes are moist       Pharynx: Oropharynx is clear  No oropharyngeal exudate or posterior oropharyngeal erythema  Eyes:      General: No scleral icterus  Right eye: No discharge  Left eye: No discharge  Conjunctiva/sclera: Conjunctivae normal       Pupils: Pupils are equal, round, and reactive to light  Comments: Cataract versus old scar tissue over Left eye  Neck:      Musculoskeletal: Normal range of motion and neck supple  No neck rigidity or muscular tenderness  Vascular: No carotid bruit  Cardiovascular:      Rate and Rhythm: Normal rate and regular rhythm  Heart sounds: Normal heart sounds  No murmur  No friction rub  No gallop  Pulmonary:      Effort: Pulmonary effort is normal  No respiratory distress  Breath sounds: No stridor  No wheezing, rhonchi or rales  Comments: No dyspnea  On O2 via nc  Chest:      Chest wall: No tenderness  Abdominal:      General: Bowel sounds are normal  There is no distension  Palpations: Abdomen is soft  There is no mass  Tenderness: There is no abdominal tenderness  There is no right CVA tenderness, left CVA tenderness, guarding or rebound  Hernia: No hernia is present     Genitourinary: Comments: Non distended bladder  Musculoskeletal:         General: No swelling, tenderness, deformity or signs of injury  Right lower leg: No edema  Left lower leg: No edema  Comments: Ambulatory dysfunction  Slight BLE edema - non pitting  Lymphadenopathy:      Cervical: No cervical adenopathy  Skin:     General: Skin is warm and dry  Capillary Refill: Capillary refill takes less than 2 seconds  Coloration: Skin is not jaundiced or pale  Findings: No bruising, erythema, lesion or rash  Comments: Multiple open wounds to BLE on varying degree of resolution  xerosis cutis   Neurological:      Mental Status: She is alert  Mental status is at baseline  Comments: Verbal with clear speech - oriented to name/birthday/ current month and year  Psychiatric:      Comments: Sleepy versus just waking up but able to maintain alertness  Cooperative  Laboratory results / Imaging reviewed: Hard copies in medical chart:    * BMP (4/8/2021) = WNL except:  Crea: 1 49 (H) <= 1 51 (H: 4/5/2021)  GFR: 29 (L) <= 29 (L: 4/5/2021)    * TSH(4/5/2021) = 0 38 WNL)    * BNP (4/2/2021) = 73 (WNL)    Current Medications:   All medications reviewed and updated in [address], CRNP  4/8/2021

## 2021-04-16 ENCOUNTER — NURSING HOME VISIT (OUTPATIENT)
Dept: GERIATRICS | Facility: OTHER | Age: 86
End: 2021-04-16
Payer: MEDICARE

## 2021-04-16 VITALS
RESPIRATION RATE: 20 BRPM | TEMPERATURE: 98.9 F | DIASTOLIC BLOOD PRESSURE: 52 MMHG | BODY MASS INDEX: 22.9 KG/M2 | HEART RATE: 75 BPM | SYSTOLIC BLOOD PRESSURE: 136 MMHG | WEIGHT: 137.6 LBS

## 2021-04-16 DIAGNOSIS — H81.10 BENIGN PAROXYSMAL POSITIONAL VERTIGO, UNSPECIFIED LATERALITY: ICD-10-CM

## 2021-04-16 DIAGNOSIS — K21.00 GASTROESOPHAGEAL REFLUX DISEASE WITH ESOPHAGITIS, UNSPECIFIED WHETHER HEMORRHAGE: Primary | ICD-10-CM

## 2021-04-16 DIAGNOSIS — E78.5 HYPERLIPIDEMIA, UNSPECIFIED HYPERLIPIDEMIA TYPE: ICD-10-CM

## 2021-04-16 DIAGNOSIS — E11.9 TYPE 2 DIABETES MELLITUS WITHOUT COMPLICATION, WITHOUT LONG-TERM CURRENT USE OF INSULIN (HCC): Chronic | ICD-10-CM

## 2021-04-16 DIAGNOSIS — N17.9 AKI (ACUTE KIDNEY INJURY) (HCC): ICD-10-CM

## 2021-04-16 DIAGNOSIS — I10 HYPERTENSION, UNSPECIFIED TYPE: ICD-10-CM

## 2021-04-16 PROCEDURE — 99309 SBSQ NF CARE MODERATE MDM 30: CPT | Performed by: PHYSICIAN ASSISTANT

## 2021-04-16 NOTE — ASSESSMENT & PLAN NOTE
Lab Results   Component Value Date    HGBA1C 6 8 (H) 12/11/2020       Blood sugar log reviewed, stable  Continue glimepiride/januvia

## 2021-04-16 NOTE — PROGRESS NOTES
[facility]  [address]  [phone] [address]  Code  32      NAME: Yoselyn Caban  AGE: 80 y o  SEX: female [id number]    DATE OF ENCOUNTER: 4/16/2021    CODE STATUS: DNR    Assessment and Plan     Problem List Items Addressed This Visit        Digestive    GERD with esophagitis - Primary     Continue famotidine            Endocrine    Type 2 diabetes mellitus without complication, without long-term current use of insulin ([facility]CC) (Chronic)       Lab Results   Component Value Date    HGBA1C 6 8 (H) 12/11/2020       Blood sugar log reviewed, stable  Continue glimepiride/januvia            Cardiovascular and Mediastinum    Hypertension     Continue amlodipine/carvedilol            Nervous and Auditory    Benign paroxysmal positional vertigo     Continue meclizine            Genitourinary    JOSÉ MIGUEL (acute kidney injury) ([facility])     Recent creatinine at baseline, 1 51  Avoid nephrotoxins            Other    Hyperlipidemia     Continue statin               No orders of the defined types were placed in this encounter  Chief Complaint     Chief Complaint   Patient presents with    Geriatric Evaluation     follow up for chronic conditions       History of Present Illness   80year old female resident of [address] [place] [address] facility being seen today in collaboration with nursing  Patient states she "doesn't feel well" but unable to be more specific  She tells me she "doesn't belong on this floor and she liked the short term rehab floor better"  She was recently transitioned to long term care  She has multiple complaints regarding clothing and books  There was a message to call her daughter but attempt to reach her home number and cell phone was unsuccessful         The following portions of the patient's history were reviewed and updated as appropriate: allergies, current medications, past family history, past medical history, past social history, past surgical history and problem list     Review of Systems   Review of Systems   Constitutional: Positive for fatigue and unexpected weight change  HENT: Negative  Eyes:        Left eye blindness   Respiratory: Negative  Cardiovascular: Negative  Gastrointestinal: Negative  Endocrine: Negative  Genitourinary: Negative  Musculoskeletal: Positive for arthralgias and gait problem  Skin: Negative  Allergic/Immunologic: Negative  Hematological: Negative  Psychiatric/Behavioral: Negative  Active Problem List     Patient Active Problem List   Diagnosis    Type 2 diabetes mellitus without complication, without long-term current use of insulin ([facility])    Hypertension    Meningioma ([facility])    Fatigue    Open wound of left lower leg    SBO (small bowel obstruction) ([facility])    Goals of care, counseling/discussion    Polymyalgia rheumatica ([facility])    GERD with esophagitis    Depressive disorder    Mesenteric vascular insufficiency ([facility])    Blind left eye    Spinal stenosis    Hyperlipidemia    Benign paroxysmal positional vertigo    Pernicious anemia    Medication management    Thrombocytosis ([facility])    Hypoxia    JOSÉ MIGUEL (acute kidney injury) ([facility])    Pleural effusion, bilateral    Ambulatory dysfunction    Hyperkalemia         Objective     /52   Pulse 75   Temp 98 9 °F (37 2 °C)   Resp 20   Wt 62 4 kg (137 lb 9 6 oz)   BMI 22 90 kg/m²     Physical Exam  Vitals signs reviewed  Constitutional:       General: She is not in acute distress  Appearance: She is not ill-appearing or diaphoretic  HENT:      Head: Normocephalic  Nose: Nose normal       Mouth/Throat:      Mouth: Mucous membranes are moist       Pharynx: Oropharynx is clear  Neck:      Musculoskeletal: Normal range of motion and neck supple  No muscular tenderness  Cardiovascular:      Rate and Rhythm: Normal rate and regular rhythm  Pulses: Normal pulses     Pulmonary:      Effort: Pulmonary effort is normal  No respiratory distress  Breath sounds: Normal breath sounds  No wheezing  Abdominal:      General: Abdomen is flat  Bowel sounds are normal  There is no distension  Palpations: Abdomen is soft  Musculoskeletal:         General: No swelling, tenderness, deformity or signs of injury  Lymphadenopathy:      Cervical: No cervical adenopathy  Skin:     General: Skin is warm and dry  Coloration: Skin is not jaundiced  Findings: No erythema  Neurological:      General: No focal deficit present  Mental Status: She is alert  Mental status is at baseline  Psychiatric:         Mood and Affect: Mood normal          Behavior: Behavior normal          Pertinent Laboratory/Diagnostic Studies:    4/5  Creat 1 51  K 4 8    Current Medications   Medications reviewed and updated in facility chart

## 2021-04-19 ENCOUNTER — NURSING HOME VISIT (OUTPATIENT)
Dept: GERIATRICS | Facility: OTHER | Age: 86
End: 2021-04-19
Payer: MEDICARE

## 2021-04-19 VITALS
HEART RATE: 68 BPM | RESPIRATION RATE: 20 BRPM | TEMPERATURE: 98.7 F | DIASTOLIC BLOOD PRESSURE: 58 MMHG | BODY MASS INDEX: 23.01 KG/M2 | SYSTOLIC BLOOD PRESSURE: 140 MMHG | WEIGHT: 138.3 LBS

## 2021-04-19 DIAGNOSIS — F32.A DEPRESSIVE DISORDER: Primary | ICD-10-CM

## 2021-04-19 PROCEDURE — 99309 SBSQ NF CARE MODERATE MDM 30: CPT | Performed by: PHYSICIAN ASSISTANT

## 2021-04-19 NOTE — ASSESSMENT & PLAN NOTE
Not currently on antidepressants  Conversation with patient who states she "feels sick all over" but refuses to be more specific  Denies pain  States she "wants to go to the hospital because they know what is wrong with me"  Nursing states patient has been wanting to stay in bed most of the day  She is not eating all of her meals consistently and has lost some weight  Discussion with daughter over the phone  Daughter states that patient has been complaining of "feeling sick all over" every morning for the past 5 years  Patient seems to be upset because she came here after a hospitalization thinking she was here for rehab and was recently told she would be staying long term  She was moved to a long term unit and she is telling me "I don't belong here, send me to the hospital, if you don't my family will take me home"  Daughter was updated as to patient's condition today  Will repeat labs tomorrow, including UA and urine C&S as daughter did mention that patient reported seeing "a little boy in her room"     She has been afebrile and VSS  Also discussed with  and will have MARY JANE see the patient

## 2021-04-19 NOTE — PROGRESS NOTES
[facility]  Sophie Ashby [address]  [phone] Uday Macario [address]  Code  32      NAME: Landon Capellan  AGE: 80 y o  SEX: female [id number]    DATE OF ENCOUNTER: 4/19/2021    CODE STATUS: DNR    Assessment and Plan     Problem List Items Addressed This Visit        Other    Depressive disorder - Primary     Not currently on antidepressants  Conversation with patient who states she "feels sick all over" but refuses to be more specific  Denies pain  States she "wants to go to the hospital because they know what is wrong with me"  Nursing states patient has been wanting to stay in bed most of the day  She is not eating all of her meals consistently and has lost some weight  Discussion with daughter over the phone  Daughter states that patient has been complaining of "feeling sick all over" every morning for the past 5 years  Patient seems to be upset because she came here after a hospitalization thinking she was here for rehab and was recently told she would be staying long term  She was moved to a long term unit and she is telling me "I don't belong here, send me to the hospital, if you don't my family will take me home"  Daughter was updated as to patient's condition today  Will repeat labs tomorrow, including UA and urine C&S as daughter did mention that patient reported seeing "a little boy in her room"  She has been afebrile and VSS  Also discussed with  and will have MARY JANE see the patient               No orders of the defined types were placed in this encounter  Chief Complaint   No chief complaint on file  History of Present Illness   80year old female resident of [address] [place] [address] facility being seen today in collaboration with nursing and   Patient is complaining of "feeling sick all over"  Patient will not be more specific, denies pain  VSS   Phone call to daughter who states patient has been saying this every morning for the past 5 years  Patient seems to be having a difficult time transitioning from short term rehab to long term care   are involved and [facility] is being consulted  The following portions of the patient's history were reviewed and updated as appropriate: allergies, current medications, past family history, past medical history, past social history, past surgical history and problem list     Review of Systems   Review of Systems   Constitutional: Positive for activity change, fatigue and unexpected weight change  Psychiatric/Behavioral: Positive for hallucinations  Active Problem List     Patient Active Problem List   Diagnosis    Type 2 diabetes mellitus without complication, without long-term current use of insulin ([facility])    Hypertension    Meningioma ([facility])    Fatigue    Open wound of left lower leg    SBO (small bowel obstruction) ([facility])    Goals of care, counseling/discussion    Polymyalgia rheumatica ([facility])    GERD with esophagitis    Depressive disorder    Mesenteric vascular insufficiency ([facility])    Blind left eye    Spinal stenosis    Hyperlipidemia    Benign paroxysmal positional vertigo    Pernicious anemia    Medication management    Thrombocytosis ([facility])    Hypoxia    JOSÉ MIGUEL (acute kidney injury) ([facility])    Pleural effusion, bilateral    Ambulatory dysfunction    Hyperkalemia         Objective     /58   Pulse 68   Temp 98 7 °F (37 1 °C)   Resp 20   Wt 62 7 kg (138 lb 4 8 oz)   BMI 23 01 kg/m²     Physical Exam  Vitals signs reviewed  Constitutional:       General: She is not in acute distress  Appearance: She is not diaphoretic  Cardiovascular:      Rate and Rhythm: Normal rate  Pulmonary:      Effort: Pulmonary effort is normal  No respiratory distress  Neurological:      Mental Status: She is alert  Mental status is at baseline     Psychiatric:      Comments: Flat affect         Pertinent Laboratory/Diagnostic Studies:    Labs pending    Current Medications   Medications reviewed and updated in facility chart  I have spent 45 minutes with Patient and family today in which greater than 50% of this time was spent in counseling/coordination of care regarding Patient and family education

## 2021-04-21 NOTE — PROGRESS NOTES
Cardiology Outpatient Consult Note - Porfirio Begum 80 y o  female MRN: [id number]    Encounter: [id number]      Assessment/Plan:    Patient Active Problem List    Diagnosis Date Noted    Hyperkalemia 04/04/2021     Priority: Low    Pleural effusion, bilateral 03/29/2021     Priority: Low    Ambulatory dysfunction 03/29/2021     Priority: Low    JOSÉ MIGUEL (acute kidney injury) ([facility] ) 03/26/2021     Priority: Low    Hypoxia 03/24/2021     Priority: Low    Goals of care, counseling/discussion 03/23/2021     Priority: Low    Polymyalgia rheumatica ([facility] ) 03/23/2021     Priority: Low    GERD with esophagitis 03/23/2021     Priority: Low    Depressive disorder 03/23/2021     Priority: Low    Mesenteric vascular insufficiency ([facility] ) 03/23/2021     Priority: Low    Blind left eye 03/23/2021     Priority: Low    Spinal stenosis 03/23/2021     Priority: Low    Hyperlipidemia 03/23/2021     Priority: Low    Benign paroxysmal positional vertigo 03/23/2021     Priority: Low    Pernicious anemia 03/23/2021     Priority: Low    Medication management 03/23/2021     Priority: Low    Thrombocytosis ([facility] ) 03/23/2021     Priority: Low    SBO (small bowel obstruction) ([facility] ) 03/16/2021     Priority: Low    Open wound of left lower leg 02/25/2021     Priority: Low    Fatigue 10/12/2019     Priority: Low    Meningioma ([facility] ) 10/11/2019     Priority: Low    Type 2 diabetes mellitus without complication, without long-term current use of insulin ([facility] ) 11/09/2018     Priority: Low    Hypertension 11/09/2018     Priority: Low       Hypoxia  Pleural effusion, bilateral    Studies- personally reviewed by me    TTE 11/9/2018: LVEF 65%  No regional wall motion abnormalities  Mild concentric LVH  Grade 1 diastology  Mild MR  Mild AI  PASP 40mmHg  Normal RV size and systolic function       DM type 2  Hypertension  Controlled on current regimen  Rx: amlodipine 10mg daily, coreg 25mg BID, lisinopril 10mg daily  Polymyalgia  Hyperlipidemia  On pravastatin 10mg daily  Acute kidney injury  Could be from intravascular volume depletion    Patient currently denies symptoms of shortness of breath or chest pain  She is minimally active and denies symptoms when walking to the bathroom or during her walking program  She was seen on the ED 3/24 for hypoxia and was found to have moderate bilateral pleural effusions  Given 3 days of lasix with worsening renal function  She is currently intravascularly euvolemic on exam with dependent edema  Can give lasix as needed and consider thoracentesis if her symptoms or hypoxia worsens  Oxygen supplementation as needed  Her albumin is low and likely contributing to third spacing  Would recommend nutrition consult  HPI:   80year old female with PMH as above who is referred for evaluation  She was recently seen in the ED for hypoxia  She had a CTA which as negative for pulmonary embolism but showed moderate bilateral pleural effusions  She was given oral lasix with worsening of renal function  She currently denies shortness of breath, chest pain, PND, orthopnea or lower extremity edema  She is not happy about her current living situation and misses her home  She reports occasional dizziness but can't give more details  Past Medical History:   Diagnosis Date    Basal cell carcinoma     Diabetes mellitus ([facility]     [place] (hard of hearing) 03/15/2021    Hypertension     Migraine     Pneumonia due to infectious organism 11/12/2018    Small bowel obstruction ([address] ) 03/15/2021       Review of Systems   Constitutional: Positive for fatigue  Negative for chills and fever  HENT: Negative for ear pain and sore throat  Eyes: Negative for pain and visual disturbance  Respiratory: Negative for cough and shortness of breath  Cardiovascular: Negative for chest pain, palpitations and leg swelling  Gastrointestinal: Negative for abdominal distention, abdominal pain and vomiting  Genitourinary: Negative for dysuria and hematuria  Musculoskeletal: Negative for arthralgias and back pain  Skin: Negative for color change and rash  Neurological: Positive for dizziness  Negative for seizures, syncope and light-headedness  All other systems reviewed and are negative  Allergies   Allergen Reactions    Atorvastatin Hives    Fentanyl Other (See Comments)    Hydrocodone-Acetaminophen GI Intolerance    Metronidazole Other (See Comments)    Penicillins Other (See Comments)    Sulfa Antibiotics Other (See Comments)     takes Amaryl @ home,takes Hyzaar @ home    Tramadol Other (See Comments)    Metoclopramide Rash    Oxycodone-Acetaminophen Rash           Current Outpatient Medications:     amLODIPine (NORVASC) 10 mg tablet, Take 1 tablet by mouth daily, Disp: , Rfl:     carvedilol (COREG) 25 mg tablet, Take 1 tablet by mouth 2 (two) times a day with meals, Disp: , Rfl:     docusate sodium (COLACE) 100 mg capsule, Take 1 capsule by mouth daily, Disp: , Rfl:     famotidine (PEPCID) 20 mg tablet, Take 1 tablet by mouth 2 (two) times a day, Disp: , Rfl:     glimepiride (AMARYL) 1 mg tablet, Take 1 tablet (1 mg total) by mouth daily with breakfast, Disp: 30 tablet, Rfl: 0    lisinopril (ZESTRIL) 10 mg tablet, Take 1 tablet by mouth daily, Disp: , Rfl:     meclizine (ANTIVERT) 12 5 MG tablet, Take 12 5 mg by mouth 2 (two) times a day, Disp: , Rfl: 0    pravastatin (PRAVACHOL) 10 mg tablet, Take 1 tablet by mouth daily, Disp: , Rfl:     senna-docusate sodium (SENOKOT S) 8 6-50 mg per tablet, Take 1 tablet by mouth daily at bedtime, Disp: 20 tablet, Rfl: 0    sitaGLIPtin (JANUVIA) 50 mg tablet, Take 1 tablet by mouth daily, Disp: , Rfl:     timolol (BETIMOL) 0 5 % ophthalmic solution, Administer 1 drop into the left eye daily at bedtime, Disp: , Rfl:     acetaminophen (TYLENOL) 325 mg tablet, Take 2 tablets (650 mg total) by mouth every 6 (six) hours as needed for mild pain (Patient not taking: Reported on 4/22/2021), Disp: 30 tablet, Rfl: 0    naproxen (NAPROSYN) 500 mg tablet, Take 1 tablet (500 mg total) by mouth every 12 (twelve) hours as needed for mild pain or moderate pain (Patient not taking: Reported on 4/22/2021), Disp: 15 tablet, Rfl: 0    prednisoLONE acetate (PRED FORTE) 1 % ophthalmic suspension, Administer 1 drop into the left eye daily, Disp: , Rfl:     Social History     Socioeconomic History    Marital status:       Spouse name: Not on file    Number of children: Not on file    Years of education: Not on file    Highest education level: Not on file   Occupational History    Not on file   Social Needs    Financial resource strain: Not on file    Food insecurity     Worry: Not on file     Inability: Not on file    Transportation needs     Medical: Not on file     Non-medical: Not on file   Tobacco Use    Smoking status: Never Smoker    Smokeless tobacco: Never Used   Substance and Sexual Activity    Alcohol use: Never     Frequency: Never     Binge frequency: Never    Drug use: No    Sexual activity: Not Currently   Lifestyle    Physical activity     Days per week: Not on file     Minutes per session: Not on file    Stress: Not on file   Relationships    Social connections     Talks on phone: Not on file     Gets together: Not on file     Attends Spiritism service: Not on file     Active member of club or organization: Not on file     Attends meetings of clubs or organizations: Not on file     Relationship status: Not on file    Intimate partner violence     Fear of current or ex partner: Not on file     Emotionally abused: Not on file     Physically abused: Not on file     Forced sexual activity: Not on file   Other Topics Concern    Not on file   Social History Narrative    Not on file       Family History   Problem Relation Age of Onset    Heart disease Brother     Arthritis Brother     Diabetes Son     Arthritis Son        Physical Exam:    Vitals: Blood pressure 130/50, pulse 67, height 5' 5" (1 651 m), weight 64 6 kg (142 lb 6 4 oz), SpO2 (!) 89 %  , Body mass index is 23 7 kg/m² ,   Wt Readings from Last 3 Encounters:   04/22/21 64 6 kg (142 lb 6 4 oz)   04/19/21 62 7 kg (138 lb 4 8 oz)   04/16/21 62 4 kg (137 lb 9 6 oz)       Physical Exam  Constitutional:       General: She is not in acute distress  Appearance: Normal appearance  HENT:      Head: Normocephalic and atraumatic  Mouth/Throat:      Mouth: Mucous membranes are moist    Eyes:      Extraocular Movements: Extraocular movements intact  Conjunctiva/sclera: Conjunctivae normal    Neck:      Musculoskeletal: Neck supple  Cardiovascular:      Rate and Rhythm: Normal rate and regular rhythm  Pulses: Normal pulses  Heart sounds: No murmur  No friction rub  No gallop  Comments: Nonelevated JVP  Trace edema  Pulmonary:      Effort: Pulmonary effort is normal  No respiratory distress  Breath sounds: No wheezing, rhonchi or rales  Abdominal:      General: There is no distension  Palpations: Abdomen is soft  Tenderness: There is no abdominal tenderness  There is no guarding  Musculoskeletal:         General: No deformity or signs of injury  Skin:     General: Skin is warm and dry  Capillary Refill: Capillary refill takes less than 2 seconds  Neurological:      General: No focal deficit present  Mental Status: She is alert and oriented to person, place, and time     Psychiatric:         Mood and Affect: Mood normal          Labs & Results:    Lab Results   Component Value Date    WBC 8 26 03/24/2021    HGB 11 2 (L) 03/24/2021    HCT 38 1 03/24/2021    MCV 90 03/24/2021     (H) 03/24/2021     Lab Results   Component Value Date    SODIUM 142 03/24/2021    K 4 4 03/24/2021     03/24/2021    CO2 31 03/24/2021    BUN 22 03/24/2021    CREATININE 1 51 (H) 03/24/2021    GLUC 208 (H) 03/24/2021    CALCIUM 9 0 03/24/2021 Lab Results   Component Value Date    NTBNP 1,021 (H) 03/24/2021      Lab Results   Component Value Date    CHOLESTEROL 129 12/11/2018     Lab Results   Component Value Date    HDL 47 12/11/2018     Lab Results   Component Value Date    TRIG 120 12/11/2018     No results found for: [facility]    EKG personally reviewed by Son Trujillo MD      Counseling / Coordination of Care  Time spent today 40 minutes  Greater than 50% of total time was spent with the patient and / or family counseling and / or coordination of care  We discussed diagnoses, most recent studies and any changes in treatment    Thank you for the opportunity to participate in the care of this patient      Santiago Cronin MD  ADVANCED HEART FAILURE AND MECHANICAL CIRCULATORY SUPPORT  [facility]

## 2021-04-22 ENCOUNTER — OFFICE VISIT (OUTPATIENT)
Dept: CARDIOLOGY CLINIC | Facility: CLINIC | Age: 86
End: 2021-04-22
Payer: MEDICARE

## 2021-04-22 VITALS
BODY MASS INDEX: 23.72 KG/M2 | HEIGHT: 65 IN | DIASTOLIC BLOOD PRESSURE: 50 MMHG | WEIGHT: 142.4 LBS | SYSTOLIC BLOOD PRESSURE: 130 MMHG | HEART RATE: 67 BPM | OXYGEN SATURATION: 89 %

## 2021-04-22 DIAGNOSIS — I10 HYPERTENSION, UNSPECIFIED TYPE: ICD-10-CM

## 2021-04-22 DIAGNOSIS — R79.89 ELEVATED BRAIN NATRIURETIC PEPTIDE (BNP) LEVEL: ICD-10-CM

## 2021-04-22 DIAGNOSIS — N17.9 AKI (ACUTE KIDNEY INJURY) (HCC): ICD-10-CM

## 2021-04-22 DIAGNOSIS — J90 PLEURAL EFFUSION, BILATERAL: ICD-10-CM

## 2021-04-22 DIAGNOSIS — R09.02 HYPOXIA: Primary | ICD-10-CM

## 2021-04-22 PROCEDURE — 93000 ELECTROCARDIOGRAM COMPLETE: CPT | Performed by: INTERNAL MEDICINE

## 2021-04-22 PROCEDURE — 99204 OFFICE O/P NEW MOD 45 MIN: CPT | Performed by: INTERNAL MEDICINE

## 2021-05-10 ENCOUNTER — NURSING HOME VISIT (OUTPATIENT)
Dept: GERIATRICS | Facility: OTHER | Age: 86
End: 2021-05-10
Payer: MEDICARE

## 2021-05-10 DIAGNOSIS — H81.10 BENIGN PAROXYSMAL POSITIONAL VERTIGO, UNSPECIFIED LATERALITY: ICD-10-CM

## 2021-05-10 DIAGNOSIS — J90 PLEURAL EFFUSION, BILATERAL: ICD-10-CM

## 2021-05-10 DIAGNOSIS — K59.01 SLOW TRANSIT CONSTIPATION: ICD-10-CM

## 2021-05-10 DIAGNOSIS — E11.9 TYPE 2 DIABETES MELLITUS WITHOUT COMPLICATION, WITHOUT LONG-TERM CURRENT USE OF INSULIN (HCC): Primary | Chronic | ICD-10-CM

## 2021-05-10 DIAGNOSIS — K21.00 GASTROESOPHAGEAL REFLUX DISEASE WITH ESOPHAGITIS, UNSPECIFIED WHETHER HEMORRHAGE: ICD-10-CM

## 2021-05-10 DIAGNOSIS — I10 HYPERTENSION, UNSPECIFIED TYPE: ICD-10-CM

## 2021-05-10 DIAGNOSIS — R53.81 DEBILITY: ICD-10-CM

## 2021-05-10 PROCEDURE — 99309 SBSQ NF CARE MODERATE MDM 30: CPT | Performed by: NURSE PRACTITIONER

## 2021-05-10 NOTE — PROGRESS NOTES
[address], [address] [place] [address], [address] [place] [address]  [phone]    NAME: Wilton Escalona  AGE: 80 y o  SEX: female    Progress Note    Location: [facility]  POS: 28 (LTC)    Assessment/Plan:     Type 2 diabetes mellitus without complication, without long-term current use of insulin ([facility])               Lab Results   Component Value Date     HGBA1C 6 8 (H) 12/11/2020   Goal: < 8%  Stable  FBG range (4/30/2021 to 5/12/2021) = 81 to 124  Fair meal intake: 25-50% per meal   Continue Glimepiride 1mg daily and Januvia 50mg daily  Will consider D/C Januvia on next visit if FBG remain < 150 consistently   Will continue to monitor    Pleural effusion, bilateral  As seen on imaging (CT scan 3/24/2021): Moderate, R>L  Patient denies any new or worsening breathing/ shortness of breath on this visit  Continue O2 supplement via nc  No hypoxia      Hypertension  Stable and controlled with current meds:  BP range (5/5-10/2021) = 107/64 to 138/72  HR range (5/5-10/2021) = 65 to 78/min  Continue the following meds:  * Amlodipine 10mg daily  * Carvedilol 25mg BID  * Lisinopril 10mg daily  Will reduce Amlodipine to 5mg daily   Will continue to monitor for now      GERD with esophagitis  Patient denies any symptoms  Continue Famotidine 20mg BID    Benign Paroxysmal Positional Vertigo  Continue Meclizine 12/5mg BID     Constipation  Irregular bowel movements  Continue Senna-S daily + Colace 100mg daily    Debility  Continue 24/7LTCF supportive care and management  Continue PT/OT as needed      Chief complaint / Reason for visit: Follow-up visit    History of Present Illness: This is a 80-year-old female patient admitted at [facility] for debility  Patient is seen and examined today to follow up acute and chronic medical conditions as mentioned above and type 2 diabetes, hypertension, pleural effusion bilateral, GERD, postural vertigo chronic, constipation      Patient is in bed for this visit - awake and alert, cooperative, calm, verbally engaged and not in distress  Patient is verbal with clear coherent speech - oriented to name and birthday only  Patient reported "I had better days" when asked how she is doing - patient presented with dysphoric mood - denies any pain, chest pain, new or worsening shortness of breath, GI/ related concerns, generalized malaise, upper respiratory symptoms including cough and sore throat, dizziness/vertigo, headache, sleep concerns  Patient acknowledged having a lot of appetite or motivated to do anything - prefers to stay in bed, stated, "I'm 80years old I didn't think I'd live this long"  Per nursing, patient usually sleeps pretty much late into the morning, poor appetite often misses breakfast because of still being sleepy but usually is more awake in the afternoons - no acute medical concerns for this visit  Review of Systems:  Per history of present illness, all other systems reviewed and negative  HISTORY:  Medical Hx: Reviewed, unchanged  Family Hx: Reviewed, unchanged  Soc Hx: Reviewed,  unchanged    ALLERGY: Reviewed, unchanged  Allergies   Allergen Reactions    Atorvastatin Hives    Fentanyl Other (See Comments)    Hydrocodone-Acetaminophen GI Intolerance    Metronidazole Other (See Comments)    Penicillins Other (See Comments)    Sulfa Antibiotics Other (See Comments)     takes Amaryl @ home,takes Hyzaar @ home    Tramadol Other (See Comments)    Metoclopramide Rash    Oxycodone-Acetaminophen Rash        PHYSICAL EXAM:  Vital Signs: T98 2F -P79 -R18 BP: 125/64 SpO2: 96% 2L O2 via nc  Weight: 140 0 lbs (4/25/2021)    General: NAD  Mild confusion  Head: Atraumatic  Normocephalic  Eye Exam: anicteric sclera, no discharge, PERRLA, No injection  Cataract versus scar tissue over Left eye  Oral Exam: moist mucous membranes, no buccaloropharyngeal erythema, palatine tonsils WNL    Neck Exam: no anterior cervical lymphadenopathy noted, neck supple  Cardiovascular: regular rate, regular rhythm, no murmurs, rubs, or gallops  Pulmonary: no wheeze, no rhonchi, no stridor, no rales  No chest tenderness  O2 dependent via nc  Chronically diminished breath sounds to Mid fields and B/L bases  Abdominal: soft, non-tender, nondistended, bowel sounds audible x 4 quadrants  : Non distended bladder  Extremities and skin: no edema noted, no rashes  Intact skin  Dry skin  Small wound to RLE  Neurological: alert, cooperative and responsive, Oriented x 2, moving all 4 extremities symmetrically - ambulatory dysfunction  Laboratory results / Imaging reviewed: Hard copy/ies in medical chart:    * CBC with diff (2021) = WNL except:  Hb 0 (L)  Hct: 33 6 (L)  Platelet: 578 (H)  RDW: 16 1 (H)    * CMP (2021) = WNL except:  Crea: 1 29 (H)  Alb: 2 7 (L)  GFR: 35 (L)    * TSH (2021) = 0 38 (WNL)    Current Medications:   All medications reviewed and updated in [facility]   5/10/2021

## 2021-05-14 ENCOUNTER — NURSING HOME VISIT (OUTPATIENT)
Dept: GERIATRICS | Facility: OTHER | Age: 86
End: 2021-05-14
Payer: MEDICARE

## 2021-05-14 DIAGNOSIS — H81.10 BENIGN PAROXYSMAL POSITIONAL VERTIGO, UNSPECIFIED LATERALITY: ICD-10-CM

## 2021-05-14 DIAGNOSIS — Z71.89 GOALS OF CARE, COUNSELING/DISCUSSION: ICD-10-CM

## 2021-05-14 DIAGNOSIS — R53.81 DEBILITY: Primary | ICD-10-CM

## 2021-05-14 DIAGNOSIS — J90 PLEURAL EFFUSION, BILATERAL: ICD-10-CM

## 2021-05-14 DIAGNOSIS — I10 HYPERTENSION, UNSPECIFIED TYPE: ICD-10-CM

## 2021-05-14 DIAGNOSIS — E11.9 TYPE 2 DIABETES MELLITUS WITHOUT COMPLICATION, WITHOUT LONG-TERM CURRENT USE OF INSULIN (HCC): Chronic | ICD-10-CM

## 2021-05-14 PROCEDURE — 99309 SBSQ NF CARE MODERATE MDM 30: CPT | Performed by: INTERNAL MEDICINE

## 2021-05-14 NOTE — ASSESSMENT & PLAN NOTE
· Secondary to her chronic medical conditions   · She continues to require 24/7 care/support of her ADLs   · I recommend continued care/support of ADLs as a long-term resident at the nursing facility  · Will continue to monitor for change in her condition

## 2021-05-14 NOTE — PROGRESS NOTES
[address]  Facility: [facility]  32    NAME: Roger De  AGE: 80 y o  SEX: female    DATE OF ENCOUNTER: 5/14/2021    Code status:  DNR    Assessment and Plan     1  Debility  Assessment & Plan:  · Secondary to her chronic medical conditions   · She continues to require 24/7 care/support of her ADLs   · I recommend continued care/support of ADLs as a long-term resident at the nursing facility  · Will continue to monitor for change in her condition      2  Hypertension, unspecified type  Assessment & Plan:  · Review of her BP and AP logs shows that she is doing well with amlodipine and carvedilol   · I will continue her on these medications   · Will continue with monitoring for change in her condition      3  Type 2 diabetes mellitus without complication, without long-term current use of insulin ([facility])  Assessment & Plan:    · Review of her fingerstick blood sugar log shows that she is doing well with her current medications   · I will discontinue her sitagliptin and glimepiride to avoid fasting hypoglycemia   · Will continue with fasting fingerstick blood sugar monitoring  · Will continue with periodic laboratory monitoring for change in her condition      4  Pleural effusion, bilateral  Assessment & Plan:  · Seen in consultation by the cardiology service April 22, 2021   · Recommendation for as needed diuretic therapy and/or thoracentesis, if her symptoms should warrant these interventions  · Will continue with monitoring for change in her condition      5  Benign paroxysmal positional vertigo, unspecified laterality  Assessment & Plan:  · She is doing well with her routine meclizine   · Will continue her on this medication  · Will continue to monitor for change in her condition      6   Goals of care, counseling/discussion  Assessment & Plan:  ·  Her resuscitation status is "do not resuscitate"      See my note of April 2, 2021 for further assessment and plan  All medications and routine orders were reviewed and updated as needed  Plan discussed with:  Nursing staff  Chief Complaint     She is a 79-year-old woman who is seen for a follow-up visit to update the care and treatment of her hypertension, type 2 diabetes mellitus, and debility secondary to her chronic medical conditions  History of Present Illness     She is a pleasant 79-year-old woman who is seen for a follow-up visit to update the care and treatment of her hypertension-doing well with amlodipine and carvedilol, type 2 diabetes mellitus-doing well with sitagliptin and glimepiride, and debility secondary to her chronic medical conditions   She continues to require 24/7 care/support of her ADLs  When asked, she denies feeling depressed, anxious, or sad  She reports a lack of ambition  Nursing reports that she frequently removes her oxygen therapy  She saw the cardiology service in consultation for an office visit on April 22, 2021  The following portions of the patient's history were reviewed and updated as appropriate: current medications, past family history, past medical history, past social history, past surgical history and problem list     Allergies: Allergies   Allergen Reactions    Atorvastatin Hives    Fentanyl Other (See Comments)    Hydrocodone-Acetaminophen GI Intolerance    Metronidazole Other (See Comments)    Penicillins Other (See Comments)    Sulfa Antibiotics Other (See Comments)     takes Amaryl @ home,takes Hyzaar @ home    Tramadol Other (See Comments)    Metoclopramide Rash    Oxycodone-Acetaminophen Rash       Review of Systems     Review of Systems   Respiratory: Negative for shortness of breath  Cardiovascular: Negative for chest pain  Psychiatric/Behavioral: Negative for dysphoric mood  The patient is not nervous/anxious           See HPI       Medications and orders     All medications reviewed and updated in Nursing Home EMR  Objective     Vitals:  Monthly vitals:  Weight 140 lb, pulse 74, blood pressure 128/67, pulse oximetry 94% on supplemental oxygen, fasting fingerstick blood sugar 94  Physical Exam  Vitals signs reviewed  Constitutional:       General: She is awake  She is not in acute distress  Appearance: She is well-developed  She is not ill-appearing, toxic-appearing or diaphoretic  Comments: She appears comfortable sitting on her bed, stated age, frail  Cardiovascular:      Rate and Rhythm: Normal rate and regular rhythm  Heart sounds: Normal heart sounds  No murmur  No friction rub  No gallop  Comments: There is no pretibial edema, bilaterally  Pulmonary:      Effort: Pulmonary effort is normal  No respiratory distress  Breath sounds: No stridor  No wheezing or rales  Comments: There is a slight decrease in breath sounds at her right base  Abdominal:      General: Abdomen is flat  There is no distension  Palpations: Abdomen is soft  There is no mass  Tenderness: There is no abdominal tenderness  There is no guarding or rebound  Neurological:      Mental Status: She is alert  Psychiatric:         Mood and Affect: Mood normal          Behavior: Behavior normal  Behavior is cooperative  Pertinent Laboratory/Diagnostic Studies: The following labs were reviewed please see chart or hospital paperwork for details  March 5, 2021:     TSH:  0 38    April 20, 2021:     CBC with diff:  WBC count 7 1, hemoglobin 11, hematocrit 33 6, platelet count [id number]     CMP:  Sodium 143, potassium 4 8, BUN 23, creatinine 1 29, fasting blood sugar 79, EGFR 35, LFTs within normal limits except albumin 2 7    - Her monthly order summary was reviewed and signed  Treatment plan reviewed with nursing staff      BECKY Lewis   5/19/2021 12:40 PM

## 2021-05-16 PROBLEM — K59.01 SLOW TRANSIT CONSTIPATION: Status: ACTIVE | Noted: 2021-05-16

## 2021-05-19 ENCOUNTER — NURSING HOME VISIT (OUTPATIENT)
Dept: GERIATRICS | Facility: OTHER | Age: 86
End: 2021-05-19
Payer: MEDICARE

## 2021-05-19 VITALS
BODY MASS INDEX: 22.3 KG/M2 | HEART RATE: 74 BPM | RESPIRATION RATE: 20 BRPM | SYSTOLIC BLOOD PRESSURE: 128 MMHG | WEIGHT: 134 LBS | TEMPERATURE: 99.1 F | DIASTOLIC BLOOD PRESSURE: 67 MMHG

## 2021-05-19 DIAGNOSIS — F32.A DEPRESSIVE DISORDER: Primary | ICD-10-CM

## 2021-05-19 PROCEDURE — 99310 SBSQ NF CARE HIGH MDM 45: CPT | Performed by: PHYSICIAN ASSISTANT

## 2021-05-19 NOTE — ASSESSMENT & PLAN NOTE
Long discussion with patient and her daughter who is at bedside doing compassionate visit today  Patient has not been eating and is not getting out of bed  She states she has no energy and "just wants to go home"  Patient had fallen at home which prompted a hospitalization and rehab here turned into long term care  Patient has has recent bloodwork, only abnormality is high platelet count, chronic but has been going up  Discussed options with daughter  Daughter is agreeable to trying antidepressant  Will try remeron to try to stimulate appetite as well  Also will add ritalin low dose during the day due to her not wanting to get out of bed  Did discuss this with psychiatry NP  Will place formal consult to psych as well  Daughter did ask about hospice options but at this point is not ready to consider this  Would be available to discuss this more in the future if patient does not respond to the antidepressants and continues to decline

## 2021-05-19 NOTE — PROGRESS NOTES
[facility]  [address]  [phone] [address]  Code  32      NAME: Fahad Batista  AGE: 80 y o  SEX: female [id number]    DATE OF ENCOUNTER: 5/19/2021    CODE STATUS: DNR    Assessment and Plan     Problem List Items Addressed This Visit        Other    Depressive disorder - Primary     Long discussion with patient and her daughter who is at bedside doing compassionate visit today  Patient has not been eating and is not getting out of bed  She states she has no energy and "just wants to go home"  Patient had fallen at home which prompted a hospitalization and rehab here turned into long term care  Patient has has recent bloodwork, only abnormality is high platelet count, chronic but has been going up  Discussed options with daughter  Daughter is agreeable to trying antidepressant  Will try remeron to try to stimulate appetite as well  Also will add ritalin low dose during the day due to her not wanting to get out of bed  Did discuss this with psychiatry NP  Will place formal consult to psych as well  Daughter did ask about hospice options but at this point is not ready to consider this  Would be available to discuss this more in the future if patient does not respond to the antidepressants and continues to decline  No orders of the defined types were placed in this encounter  Chief Complaint     Chief Complaint   Patient presents with    Geriatric Evaluation     not eating or getting out of bed       History of Present Illness   80year old female resident of [address] [place] [address] facility being seen today in collaboration with nursing, psychiatry and patient's daughter  Patient has not been eating well, today she refused to eat her birthday meal tray  She refuses to get out of bed most days  She lays in bed saying she "feels sick" but won't be more specific   Her daughter states she has been saying this every morning for years  She denies pain  Patient wants to either go home or to the hospital  She tells me she is a rehab patient and doesn't belong here  Daughter states her PCP suggested an antidepressant years ago and wrote her a script but when the patient's friends saw it they told her it would "make her crazy" so she never got it filled  The following portions of the patient's history were reviewed and updated as appropriate: allergies, current medications, past family history, past medical history, past social history, past surgical history and problem list     Review of Systems   Review of Systems   Unable to perform ROS: Psychiatric disorder          Active Problem List     Patient Active Problem List   Diagnosis    Type 2 diabetes mellitus without complication, without long-term current use of insulin ([facility] )    Hypertension    Meningioma ([facility] )    Fatigue    Open wound of left lower leg    SBO (small bowel obstruction) ([facility] )    Goals of care, counseling/discussion    Polymyalgia rheumatica ([facility] )    GERD with esophagitis    Depressive disorder    Mesenteric vascular insufficiency (HCC)    Blind left eye    Spinal stenosis    Hyperlipidemia    Benign paroxysmal positional vertigo    Pernicious anemia    Medication management    Thrombocytosis ([address]    Hypoxia    JOSÉ MIGUEL (acute kidney injury) ([address] )    Pleural effusion, bilateral    Ambulatory dysfunction    Hyperkalemia    Debility    Slow transit constipation         Objective     /67   Pulse 74   Temp 99 1 °F (37 3 °C)   Resp 20   Wt 60 8 kg (134 lb)   BMI 22 30 kg/m²     Physical Exam  Vitals signs reviewed  Constitutional:       General: She is not in acute distress  Appearance: She is not ill-appearing or diaphoretic  HENT:      Head: Normocephalic and atraumatic  Mouth/Throat:      Mouth: Mucous membranes are moist       Pharynx: Oropharynx is clear     Pulmonary:      Effort: Pulmonary effort is normal  No respiratory distress  Musculoskeletal:         General: No deformity or signs of injury  Skin:     General: Skin is warm and dry  Coloration: Skin is not jaundiced  Findings: No erythema  Neurological:      General: No focal deficit present  Psychiatric:      Comments: Laying in bed with eyes closed  Flat affect  Pertinent Laboratory/Diagnostic Studies:    Platelets 195    Current Medications   Medications reviewed and updated in facility chart  I have spent 45 minutes with Patient and family today in which greater than 50% of this time was spent in counseling/coordination of care regarding Patient and family education

## 2021-05-19 NOTE — ASSESSMENT & PLAN NOTE
· Seen in consultation by the cardiology service April 22, 2021   · Recommendation for as needed diuretic therapy and/or thoracentesis, if her symptoms should warrant these interventions  · Will continue with monitoring for change in her condition

## 2021-05-19 NOTE — ASSESSMENT & PLAN NOTE
· Review of her fingerstick blood sugar log shows that she is doing well with her current medications   · I will discontinue her sitagliptin and glimepiride to avoid fasting hypoglycemia   · Will continue with fasting fingerstick blood sugar monitoring  · Will continue with periodic laboratory monitoring for change in her condition

## 2021-05-19 NOTE — ASSESSMENT & PLAN NOTE
· She is doing well with her routine meclizine   · Will continue her on this medication  · Will continue to monitor for change in her condition

## 2021-05-19 NOTE — ASSESSMENT & PLAN NOTE
· Review of her BP and AP logs shows that she is doing well with amlodipine and carvedilol   · I will continue her on these medications   · Will continue with monitoring for change in her condition

## 2021-06-08 ENCOUNTER — NURSING HOME VISIT (OUTPATIENT)
Dept: GERIATRICS | Facility: OTHER | Age: 86
End: 2021-06-08
Payer: MEDICARE

## 2021-06-08 DIAGNOSIS — F32.1 CURRENT MODERATE EPISODE OF MAJOR DEPRESSIVE DISORDER WITHOUT PRIOR EPISODE (HCC): Primary | ICD-10-CM

## 2021-06-08 DIAGNOSIS — F03.90 DEMENTIA WITHOUT BEHAVIORAL DISTURBANCE, UNSPECIFIED DEMENTIA TYPE (HCC): ICD-10-CM

## 2021-06-08 PROCEDURE — 99310 SBSQ NF CARE HIGH MDM 45: CPT | Performed by: NURSE PRACTITIONER

## 2021-06-09 NOTE — PROGRESS NOTES
[address] [facility]  [address]  POS: 32: NF- Long Term, [facility] [place]     PSYCHIATRIC EVALUATION     NAME: Katerin Lindsey  AGE: 80 y o  SEX: female [id number]    DATE OF ENCOUNTER: 6/8/2021    Code status:  DNR    Assessment and Plan      Diagnosis ICD-10-CM Associated Orders   1  Current moderate episode of major depressive disorder without prior episode ([facility] [address] )  F32 1    2  Dementia without behavioral disturbance, unspecified dementia type ([facility] )  F03 90        All medications and routine orders were reviewed and updated as needed  Evaluation of Psychotropic Drugs for possible gradual dose reductions    Psychotropic medications have been reviewed  Patient continues with symptoms of depression as noted below  Any dose reductions at this time would be clinically contraindicated, as it would be likely to cause worsening of symptoms  Plan discussed with: Patient    Treatment Recommendations/Precautions:    Continue medications the same  Medication management every 2 weeks    Medications Risks/Benefits:      Risks, Benefits And Possible Side Effects Of Medications:    Risks, benefits, and possible side effects of medications explained to Arturo Yarbrough and she verbalizes understanding and agreement for treatment  Controlled Medication Discussion:     Arturo Yarbrough has been filling controlled prescriptions on time as prescribed according to [facility]    Chief Complaint     Seen for Psychiatric Consult  at Nursing Facility/Assisted Living    History of Present Illness     Arturo Yarbrough is a 79 yo yo female with previous psychiatric history of depression and mild cognitive impairment  She was admitted to [facility] initially for short term rehab after hospitalization for small bowel obstruction  Psychiatry is consulted to establish care and follow up for depression    Patient has been experiencing some increase in depression and low motivation, not wanting to get out of bed, poor appetite  She was started on Ritalin 2 5 mg BID and Remeron 7 5 mg at HS on 5/19/2021  She does report history of depression, but cannot recall previous medication trials  Today she is seen in her room, talking with her roommate and watching TV  She is fairly bright and engaged  She is generally reluctant to get up in the morning and does better in afternoons  She continues with poor appetite  She does often wonder why she is still living at 80, but denies any active suicidal thoughts  With plan to increase Remeron to 15 mg  Addendum:  6/10/2021, received call from unit charge nurse that Ritalin has not been covered by patients insurance and noted minimal if any improvements and Ritalin will be discontinued at this time  Depression  This is a chronic problem  The current episode started more than 1 year ago  The problem occurs daily  The problem has been gradually improving  The treatment provided mild relief         She  reports hypersomnia, decreased appetite, decreased energy    The following portions of the patient's history were reviewed and updated as appropriate: allergies, current medications, past family history, past medical history, past social history, past surgical history and problem list     Past Psychiatric History:     Past Inpatient Psychiatric Treatment:   No history of past inpatient psychiatric admissions  Past Outpatient Psychiatric Treatment:    No history of past outpatient psychiatric treatment  Past Suicide Attempts: no  Past Violent Behavior: no  Past Psychiatric Medication Trials: patient does not remember    Traumatic History:     Abuse: no history of physical or sexual abuse  Other Traumatic Events: none    HISTORY:  Past Medical History:   Diagnosis Date    Basal cell carcinoma     Diabetes mellitus ([address] )     [place] (hard of hearing) 03/15/2021    Hypertension     Migraine     Pneumonia due to infectious organism 11/12/2018    Small bowel obstruction ([facility] ) 03/15/2021     Family History   Problem Relation Age of Onset    Heart disease Brother     Arthritis Brother     Diabetes Son     Arthritis Son      Social History     Socioeconomic History    Marital status:      Spouse name: None    Number of children: None    Years of education: None    Highest education level: None   Occupational History    None   Social Needs    Financial resource strain: None    Food insecurity     Worry: None     Inability: None    Transportation needs     Medical: None     Non-medical: None   Tobacco Use    Smoking status: Never Smoker    Smokeless tobacco: Never Used   Substance and Sexual Activity    Alcohol use: Never     Frequency: Never     Binge frequency: Never    Drug use: No    Sexual activity: Not Currently   Lifestyle    Physical activity     Days per week: None     Minutes per session: None    Stress: None   Relationships    Social connections     Talks on phone: None     Gets together: None     Attends Worship service: None     Active member of club or organization: None     Attends meetings of clubs or organizations: None     Relationship status: None    Intimate partner violence     Fear of current or ex partner: None     Emotionally abused: None     Physically abused: None     Forced sexual activity: None   Other Topics Concern    None   Social History Narrative    None       Allergies:   Allergies   Allergen Reactions    Atorvastatin Hives    Fentanyl Other (See Comments)    Hydrocodone-Acetaminophen GI Intolerance    Metronidazole Other (See Comments)    Penicillins Other (See Comments)    Sulfa Antibiotics Other (See Comments)     takes Amaryl @ home,takes Hyzaar @ home    Tramadol Other (See Comments)    Metoclopramide Rash    Oxycodone-Acetaminophen Rash       Review of Systems     Review of Systems   Psychiatric/Behavioral: Positive for decreased concentration, depression and dysphoric mood    All other systems reviewed and are negative        Medications and orders       Current Outpatient Medications:     acetaminophen (TYLENOL) 325 mg tablet, Take 2 tablets (650 mg total) by mouth every 6 (six) hours as needed for mild pain, Disp: 30 tablet, Rfl: 0    amLODIPine (NORVASC) 10 mg tablet, Take 1 tablet by mouth daily, Disp: , Rfl:     carvedilol (COREG) 25 mg tablet, Take 1 tablet by mouth 2 (two) times a day with meals, Disp: , Rfl:     docusate sodium (COLACE) 100 mg capsule, Take 1 capsule by mouth daily, Disp: , Rfl:     famotidine (PEPCID) 20 mg tablet, Take 1 tablet by mouth 2 (two) times a day, Disp: , Rfl:     lisinopril (ZESTRIL) 10 mg tablet, Take 1 tablet by mouth daily, Disp: , Rfl:     meclizine (ANTIVERT) 12 5 MG tablet, Take 12 5 mg by mouth 2 (two) times a day, Disp: , Rfl: 0    pravastatin (PRAVACHOL) 10 mg tablet, Take 1 tablet by mouth daily, Disp: , Rfl:     prednisoLONE acetate (PRED FORTE) 1 % ophthalmic suspension, Administer 1 drop into the left eye daily, Disp: , Rfl:     senna-docusate sodium (SENOKOT S) 8 6-50 mg per tablet, Take 1 tablet by mouth daily at bedtime, Disp: 20 tablet, Rfl: 0    timolol (BETIMOL) 0 5 % ophthalmic solution, Administer 1 drop into the left eye daily at bedtime, Disp: , Rfl:       Objective     Mental Status Evaluation:    Appearance age appropriate, casually dressed   Behavior pleasant, cooperative   Speech decreased rate   Mood depressed   Affect flat   Thought Processes organized, decreased rate of thoughts   Associations intact associations   Thought Content no overt delusions   Perceptual Disturbances: occassioanl visual hallucinations or illusions   Abnormal Thoughts  Risk Potential Suicidal ideation - None  Homicidal ideation - None  Potential for aggression - No   Orientation oriented to person and situation   Memory recent memory impaired   Consciousness alert and awake   Attention Span Concentration Span attention span and concentration appear shorter than expected for age   Intellect appears to be of average intelligence   Insight limited   Judgement limited   Muscle Strength and  Gait uses wheelchair   Motor Activity no abnormal movements   Language no difficulty naming common objects, no difficulty repeating a phrase, no difficulty writing a sentence   Fund of Knowledge adequate fund of knowledge regarding past history  adequate fund of knowledge regarding vocabulary   impaired knowledge of current events   Pain none   Pain Scale 0       Physical Exam  Vitals signs and nursing note reviewed  Psychiatric:         Mood and Affect: Mood is depressed  Affect is flat  Speech: Speech is delayed  Behavior: Behavior is slowed and withdrawn  Cognition and Memory: Cognition is impaired  Pertinent Laboratory/Diagnostic Studies:   I have personally reviewed pertinent lab results  Counseling / Coordination of Care  Total floor / unit time spent today 35 minutes  Greater than 50% of total time was spent with the patient and / or family counseling and / or coordination of care       SAMIRA Rodgers 06/08/21

## 2021-06-11 ENCOUNTER — NURSING HOME VISIT (OUTPATIENT)
Dept: GERIATRICS | Age: 86
End: 2021-06-11
Payer: MEDICARE

## 2021-06-11 DIAGNOSIS — I10 HYPERTENSION, UNSPECIFIED TYPE: ICD-10-CM

## 2021-06-11 DIAGNOSIS — R26.2 AMBULATORY DYSFUNCTION: Primary | ICD-10-CM

## 2021-06-11 DIAGNOSIS — J02.9 SORE THROAT: ICD-10-CM

## 2021-06-11 DIAGNOSIS — F32.A DEPRESSIVE DISORDER: ICD-10-CM

## 2021-06-11 DIAGNOSIS — E11.9 TYPE 2 DIABETES MELLITUS WITHOUT COMPLICATION, WITHOUT LONG-TERM CURRENT USE OF INSULIN (HCC): Chronic | ICD-10-CM

## 2021-06-11 DIAGNOSIS — H10.31 ACUTE BACTERIAL CONJUNCTIVITIS OF RIGHT EYE: ICD-10-CM

## 2021-06-11 DIAGNOSIS — F03.90 DEMENTIA WITHOUT BEHAVIORAL DISTURBANCE, UNSPECIFIED DEMENTIA TYPE (HCC): ICD-10-CM

## 2021-06-11 PROCEDURE — 99309 SBSQ NF CARE MODERATE MDM 30: CPT | Performed by: NURSE PRACTITIONER

## 2021-06-14 VITALS
HEART RATE: 74 BPM | SYSTOLIC BLOOD PRESSURE: 128 MMHG | WEIGHT: 124.2 LBS | BODY MASS INDEX: 20.67 KG/M2 | TEMPERATURE: 98.7 F | DIASTOLIC BLOOD PRESSURE: 67 MMHG | RESPIRATION RATE: 20 BRPM

## 2021-06-14 PROBLEM — H10.9 CONJUNCTIVITIS: Status: ACTIVE | Noted: 2021-06-14

## 2021-06-14 PROBLEM — J02.9 SORE THROAT: Status: ACTIVE | Noted: 2021-06-14

## 2021-06-14 NOTE — ASSESSMENT & PLAN NOTE
· Blood sugar remains well controlled  · Not currently taking any medications  · Continue to monitor BS  · Periodic labs to monitor  Lab Results   Component Value Date    HGBA1C 6 8 (H) 12/11/2020

## 2021-06-14 NOTE — ASSESSMENT & PLAN NOTE
· Pleasant at time of assessment  · Continue with mirtazapine and escitalopram  · Continue to monitor mood and affect

## 2021-06-14 NOTE — ASSESSMENT & PLAN NOTE
· At baseline  · Continue with supportive care  · Reorient and redirect as needed  · Monitor for delirium

## 2021-06-14 NOTE — ASSESSMENT & PLAN NOTE
· C/o slight sore throat x 2 days  · Ordered chloraseptic spray  · Continue with encourage fluids  · Slight redness back of throat, no small white spots  · Monitor for changes

## 2021-06-14 NOTE — PROGRESS NOTES
[facility]  [address]  [phone]  Saeed Davis  LTCF Progress Note        NAME: [facility]  AGE: 80 y o  SEX: female    DATE OF ENCOUNTER: 6/11/21    Assessment and Plan     1  Ambulatory dysfunction  Assessment & Plan:  · Doing well at LTCF  · Continue with restorative care  · Assist with ADL and IADL  · Fall precautions      2  Dementia without behavioral disturbance, unspecified dementia type ([address] )  Assessment & Plan:  · At baseline  · Continue with supportive care  · Reorient and redirect as needed  · Monitor for delirium      3  Acute bacterial conjunctivitis of right eye  Assessment & Plan:  · Left eye red with slight milky discharge  · C/o itchy and burning  · Ordered tobrex eye drops  · Continue to monitor for changes      4  Depressive disorder  Assessment & Plan:  · Pleasant at time of assessment  · Continue with mirtazapine and escitalopram  · Continue to monitor mood and affect      5  Hypertension, unspecified type  Assessment & Plan:  · Controlled  · Continue with carvedilol, lisinopril, amlodipine  · Continue to monitor BP      6  Sore throat  Assessment & Plan:  · C/o slight sore throat x 2 days  · Ordered chloraseptic spray  · Continue with encourage fluids  · Slight redness back of throat, no small white spots  · Monitor for changes      7  Type 2 diabetes mellitus without complication, without long-term current use of insulin ([facility])  Assessment & Plan:  · Blood sugar remains well controlled  · Not currently taking any medications  · Continue to monitor BS  · Periodic labs to monitor  Lab Results   Component Value Date    HGBA1C 6 8 (H) 12/11/2020             No orders of the defined types were placed in this encounter        History of Present Illness     [facility] 80 y o  female seen for follow-up of care and treatment plan for ambulatory dysfunction doing well at LTCF, dementia at baseline, conjunctivities of right eye healing with tobrex, depressive disorder doing well with medications, HTN controlled with BP meds, sore throat controlled with chloriseptic spray, DM2 controlled with diet     The following portions of the patient's history were reviewed and updated as appropriate: allergies, current medications, past family history, past medical history, past social history, past surgical history and problem list     Review of Systems     Review of Systems   Constitutional: Negative for chills and fever  HENT: Positive for sore throat  Negative for ear pain  Eyes: Negative for pain and visual disturbance  Respiratory: Negative for cough and shortness of breath  Cardiovascular: Negative for chest pain and palpitations  Gastrointestinal: Negative for abdominal pain and vomiting  Genitourinary: Negative for dysuria and hematuria  Musculoskeletal: Positive for gait problem  Negative for arthralgias and back pain  Skin: Negative for color change and rash  Neurological: Positive for weakness  Negative for seizures and syncope  All other systems reviewed and are negative        Active Problem List     Patient Active Problem List   Diagnosis    Type 2 diabetes mellitus without complication, without long-term current use of insulin ([facility] )    Hypertension    Meningioma ([facility])    Fatigue    Open wound of left lower leg    SBO (small bowel obstruction) ([facility])    Goals of care, counseling/discussion    Polymyalgia rheumatica (HCC)    GERD with esophagitis    Depressive disorder    Mesenteric vascular insufficiency ([facility])    Blind left eye    Spinal stenosis    Hyperlipidemia    Benign paroxysmal positional vertigo    Pernicious anemia    Medication management    Thrombocytosis ([facility])    Hypoxia    JOSÉ MIGUEL (acute kidney injury) ([address] )    Pleural effusion, bilateral    Ambulatory dysfunction    Hyperkalemia    Debility    Slow transit constipation    Current moderate episode of major depressive disorder without prior episode ([address] )    Dementia without behavioral disturbance (HCC)    Conjunctivitis    Sore throat       Objective     /67   Pulse 74   Temp 98 7 °F (37 1 °C)   Resp 20   Wt 56 3 kg (124 lb 3 2 oz)   BMI 20 67 kg/m²     Physical Exam  Vitals reviewed  Constitutional:       Appearance: She is well-developed  HENT:      Head: Normocephalic and atraumatic  Mouth/Throat:      Pharynx: Oropharynx is clear  Posterior oropharyngeal erythema (slight) present  No pharyngeal swelling, oropharyngeal exudate or uvula swelling  Eyes:      Conjunctiva/sclera: Conjunctivae normal    Cardiovascular:      Rate and Rhythm: Normal rate and regular rhythm  Heart sounds: Normal heart sounds, S1 normal and S2 normal  No murmur heard  Pulmonary:      Effort: Pulmonary effort is normal  No respiratory distress  Breath sounds: Normal breath sounds  No wheezing  Abdominal:      General: Bowel sounds are normal  There is no distension  Palpations: Abdomen is soft  Tenderness: There is no abdominal tenderness  Musculoskeletal:         General: Normal range of motion  Cervical back: Normal range of motion  Comments: Generalized weakness   Skin:     General: Skin is warm and dry  Neurological:      Mental Status: She is alert  Psychiatric:         Attention and Perception: Attention normal          Mood and Affect: Mood normal          Speech: Speech normal          Behavior: Behavior normal          Thought Content: Thought content normal          Pertinent Laboratory/Diagnostic Studies:  [facility] Reviewed    Current Medications   Medication list reviewed and updated today  Please see paper chart for updated medication list      Naresh Epps  4:79 PM      Name: Katelyn [facility]  : 1923  MRN: [id number]  DOS: 21    Diagnoses:   Diagnosis ICD-10-CM Associated Orders   1  Ambulatory dysfunction  R26 2    2   Dementia without behavioral disturbance, unspecified dementia type ([facility] )  F03 90    3  Acute bacterial conjunctivitis of right eye  H10 31    4  Depressive disorder  F32 9    5  Hypertension, unspecified type  I10    6  Sore throat  J02 9    7   Type 2 diabetes mellitus without complication, without long-term current use of insulin (HCC)  E11 9

## 2021-06-14 NOTE — ASSESSMENT & PLAN NOTE
· Left eye red with slight milky discharge  · C/o itchy and burning  · Ordered tobrex eye drops  · Continue to monitor for changes

## 2023-06-15 NOTE — PROGRESS NOTES
[address] [address]  [address] [place] [address]  Facility: [facility]  31      HISTORY AND PHYSICAL      NAME: Ti Callaway  AGE: 80 y o  SEX: female    DATE OF ENCOUNTER: 3/22/2021    Code status:  DNR    Assessment and Plan     1  SBO (small bowel obstruction) ([address] )  Assessment & Plan:  · Status post hospitalization from March 15, 2021 through March 19, 2021   · Treated with conservative management of NPO status, intravenous fluid resuscitation status, and analgesia   · Deemed improved at the time of discharge   · Will order PT/OT evaluation and treatment to assist her in returning to her prior level of functioning   · She will follow-up with the surgery service, as planned   · Will continue to monitor for change in condition  · She will follow-up with her PCP upon discharge      2  Type 2 diabetes mellitus without complication, without long-term current use of insulin [facility])  Assessment & Plan:    · December 11, 2020:  Hemoglobin A1c:  6 8%   · For now, I will continue her on her outpatient regimen of Januvia and Amaryl  · Will continue with monitoring of her fingerstick blood sugar levels and adjust her medications, as indicated   · She will follow-up with her PCP upon discharge      3  Hypertension, unspecified type  Assessment & Plan:  · Her blood pressure has been mildly elevated with amlodipine 10 mg daily, carvedilol 25 mg twice daily, and lisinopril 10 mg daily   · For now, I will continue her on this regimen as it is her routine regimen as an outpatient  · Will continue to monitor for change in condition and need to up titrate medication   · She will follow-up with her PCP upon discharge      4  Thrombocytosis [facility])  Assessment & Plan:  · March 17, 2021:  Platelet count: [id number]   · Asymptomatic   · Will continue with clinical and periodic laboratory monitoring for change in condition      5   Benign paroxysmal positional vertigo, unspecified laterality  Assessment & Plan:  · Will continue her on her outpatient regimen of meclizine twice daily   · Will continue to monitor for change in condition  · She will follow-up with her PCP upon discharge      6  Gastroesophageal reflux disease with esophagitis, unspecified whether hemorrhage  Assessment & Plan:  · Will continue her on her outpatient regimen of famotidine twice daily   · Will continue to monitor for change in condition   · She will follow-up with her PCP upon discharge      7  Goals of care, counseling/discussion  Assessment & Plan:  · As discussed with her during my visit, she wishes for her resuscitation status to be "do not resuscitate "        All medications and routine orders were reviewed and updated as needed  Plan discussed with: Nursing staff  Chief Complaint     She is seen for a visit to perform a history and physical exam to be admitted to the rehabilitation facility  History of Present Illness     History is obtained from patient interview, review of hospital medical record, and review of last PCP note obtained from the PCPs office by myself for the visit  She is a pleasant 70-year-old woman with the comorbidities of hypertension, GERD with esophagitis, depressive disorder, spinal stenosis, type 2 diabetes mellitus without chronic insulin usage, hyperlipidemia, benign paroxysmal positional vertigo, and 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 who is seen for a visit to perform a history and physical exam to be admitted to the rehabilitation facility  She presented to the emergency room on March 15, 2021 with nausea, vomiting, and abdominal pain  Records show pain was right-sided in location  Emergency room evaluation revealed a small-bowel obstruction  She was admitted to the acute [facility] hospital from March 15, 2021 through March 19, 2021  She was treated with NPO status, intravenous fluid resuscitation, and analgesia    Per records, she gave informed refusal for the placement of an NG tube for decompression  With conservative management, her small-bowel obstruction symptoms and exam improved during her hospitalization  PT evaluation recommended a post acute care rehabilitation stay prior to returning home  She was felt medically stable to transfer to the rehabilitation facility on March 19, 2021  When asked, she endorses having no complaints or concerns for me during the visit  She participated with therapy, today  She endorses living with her daughter  Review of records shows that she ambulates with a cane and requires minimal assistance with her ADLs at baseline  HISTORY:  Past Medical History:   Diagnosis Date    Basal cell carcinoma     Diabetes mellitus ([facility] )     Jamul (hard of hearing) 03/15/2021    Hypertension     Migraine     Pneumonia due to infectious organism 11/12/2018    Small bowel obstruction ([facility] ) 03/15/2021     Past Surgical History:   Procedure Laterality Date    HIP SURGERY      ORIF Left Hip     Family History   Problem Relation Age of Onset    Heart disease Brother     Arthritis Brother     Diabetes Son     Arthritis Son      Social History     Socioeconomic History    Marital status:       Spouse name: Not on file    Number of children: Not on file    Years of education: Not on file    Highest education level: Not on file   Occupational History    Not on file   Social Needs    Financial resource strain: Not on file    Food insecurity     Worry: Not on file     Inability: Not on file    Transportation needs     Medical: Not on file     Non-medical: Not on file   Tobacco Use    Smoking status: Never Smoker    Smokeless tobacco: Never Used   Substance and Sexual Activity    Alcohol use: Never     Frequency: Never     Binge frequency: Never    Drug use: No    Sexual activity: Not Currently   Lifestyle    Physical activity     Days per week: Not on file     Minutes per session: Not on file    Stress: Not on file   Relationships    Social connections     Talks on phone: Not on file     Gets together: Not on file     Attends Hindu service: Not on file     Active member of club or organization: Not on file     Attends meetings of clubs or organizations: Not on file     Relationship status: Not on file    Intimate partner violence     Fear of current or ex partner: Not on file     Emotionally abused: Not on file     Physically abused: Not on file     Forced sexual activity: Not on file   Other Topics Concern    Not on file   Social History Narrative    Not on file       Allergies: Allergies   Allergen Reactions    Atorvastatin Hives    Fentanyl Other (See Comments)    Hydrocodone-Acetaminophen GI Intolerance    Metronidazole Other (See Comments)    Penicillins Other (See Comments)    Sulfa Antibiotics Other (See Comments)     takes Amaryl @ home,takes Hyzaar @ home    Tramadol Other (See Comments)    Metoclopramide Rash    Oxycodone-Acetaminophen Rash       Review of Systems     Review of Systems   Constitutional: Negative for fever  HENT: Positive for hearing loss  Eyes: Positive for visual disturbance (Blind in her left eye )  Respiratory: Negative for shortness of breath  Cardiovascular: Negative for chest pain  Gastrointestinal: Negative for abdominal pain, constipation, nausea and vomiting  Genitourinary: Negative for difficulty urinating and dysuria  Musculoskeletal: Negative for arthralgias  Skin: Negative for rash  Neurological: Negative for headaches  Hematological: Does not bruise/bleed easily  Psychiatric/Behavioral: Negative for dysphoric mood and sleep disturbance  Medications and orders     All medications reviewed and updated in Nursing Home EMR  Objective     Vitals:   Weight 145 lb, temperature 98 7° F, pulse 78, blood pressure 162/69, pulse oximetry 95%, fasting fingerstick blood sugar 91  Physical Exam  Vitals signs reviewed  Constitutional:       General: She is awake  She is not in acute distress  Appearance: She is well-developed  She is not ill-appearing, toxic-appearing or diaphoretic  Comments: She appears comfortable lying in bed, her stated age, and frail  HENT:      Head: Normocephalic  Nose: Nose normal    Eyes:      General: No scleral icterus  Extraocular Movements: Extraocular movements intact  Neck:      Musculoskeletal: Neck supple  Thyroid: No thyroid mass, thyromegaly or thyroid tenderness  Cardiovascular:      Rate and Rhythm: Normal rate and regular rhythm  Heart sounds: Normal heart sounds  No murmur  No friction rub  No gallop  Comments: There is no pretibial edema, bilaterally  Pulmonary:      Effort: Pulmonary effort is normal  No respiratory distress  Breath sounds: Normal breath sounds  No stridor  No wheezing, rhonchi or rales  Abdominal:      General: Abdomen is flat  There is no distension  Palpations: Abdomen is soft  There is no mass  Tenderness: There is no abdominal tenderness  There is no guarding or rebound  Musculoskeletal:         General: No deformity  Skin:     Findings: No lesion or rash  Neurological:      Mental Status: She is alert and oriented to person, place, and time  Psychiatric:         Mood and Affect: Mood normal          Behavior: Behavior is cooperative  Pertinent Laboratory/Diagnostic Studies: The following labs/studies were reviewed please see chart or hospital paperwork for details      Lab Results   Component Value Date    WBC 8 00 03/17/2021    HGB 10 1 (L) 03/17/2021    HCT 34 3 (L) 03/17/2021    MCV 89 03/17/2021     (H) 03/17/2021     Lab Results   Component Value Date    SODIUM 140 03/18/2021    K 4 2 03/18/2021     (H) 03/18/2021    CO2 28 03/18/2021    BUN 13 03/18/2021    CREATININE 0 94 03/18/2021    GLUC 64 (L) 03/18/2021    CALCIUM 8 9 03/18/2021     Lab Results   Component Value Date    ALT 11 (L) 03/15/2021    AST 13 03/15/2021 ALKPHOS 101 03/15/2021     Lab Results   Component Value Date    CKA2BTRQPHKG 0 367 10/12/2019     Lab Results   Component Value Date    HGBA1C 6 8 (H) 12/11/2020     March 15, 2021:     CT of abdomen and pelvis with contrast:     IMPRESSION:     Fluid-filled loops of small bowel measuring up to 3 cm with surrounding inflammatory changes  No definite transition point  Findings may represent early small bowel obstruction versus ileus  Recommend small bowel follow-through for further evaluation      Colonic diverticulosis with minimal inflammatory changes around the sigmoid colon which may represent acute diverticulitis  No drainable fluid collection      If not already performed recently, colonoscopy after the acute illness is recommended to rule out possibility of colon cancer mimicking diverticulitis      Markedly distended gallbladder similar to prior study  If there is concern for acute cholecystitis ultrasound should be obtained        - Her admission order summary was reviewed and signed  Treatment plan reviewed with nursing staff      BECKY Esparza   3/23/2021 10:39 AM done

## 2023-06-29 NOTE — PROGRESS NOTES
[facility]  [address]  [phone] [address]  [address]      NAME: Theresa Pepper  AGE: 80 y o  SEX: female [id number]    DATE OF ENCOUNTER: 3/26/2021    CODE STATUS: DNR    Assessment and Plan     Problem List Items Addressed This Visit        Genitourinary    JOSÉ MIGUEL (acute kidney injury) ([address] ) - Primary     Recently sent to ED for hypoxia  Had CT/PE study which was negative  sats improved  Creat was 1 5 in hospital  Admission was recommended but patient refused  Creatinine today 1 26  Continue to encourage PO fluids  Avoid nephrotoxins  Repeat blood work ordered for next week  Will follow               No orders of the defined types were placed in this encounter  Chief Complaint     Chief Complaint   Patient presents with    Geriatric Evaluation     follow up after ED visit       History of Present Illness   80year old female being seen today in collaboration with nursing for follow up after being sent to the ED for hypoxia earlier this week  She had a PE study which was negative  She was found to have an JOSÉ MIGUEL and her creatinine was 1 5  She refused admission  She continues to complain about feeling tired  She denies chest pain or shortness of breath  The following portions of the patient's history were reviewed and updated as appropriate: allergies, current medications, past family history, past medical history, past social history, past surgical history and problem list     Review of Systems   Review of Systems   Constitutional: Positive for fatigue  Negative for appetite change and fever  Respiratory: Negative for chest tightness and shortness of breath  Cardiovascular: Negative for leg swelling            Active Problem List     Patient Active Problem List   Diagnosis    Type 2 diabetes mellitus without complication, without long-term current use of insulin ([address] )    Hypertension    Meningioma ([address] )    Open wound of left lower leg    SBO ([facility] obstruction) ([facility]    Goals of care, counseling/discussion    Polymyalgia rheumatica ([facility])    GERD with esophagitis    Depressive disorder    Mesenteric vascular insufficiency ([facility])    Blind left eye    Spinal stenosis    Hyperlipidemia    Benign paroxysmal positional vertigo    Pernicious anemia    Medication management    Thrombocytosis ([facility])    Hypoxia    JOSÉ MIGUEL (acute kidney injury) ([facility])         Objective     /67   Pulse 78   Temp 98 9 °F (37 2 °C)   Resp 20   Wt 65 7 kg (144 lb 12 8 oz)   SpO2 98%   BMI 24 10 kg/m²     Physical Exam  Constitutional:       General: She is not in acute distress  Appearance: She is normal weight  She is not ill-appearing or diaphoretic  HENT:      Head: Normocephalic  Cardiovascular:      Rate and Rhythm: Normal rate and regular rhythm  Pulmonary:      Effort: Pulmonary effort is normal  No respiratory distress  Breath sounds: Normal breath sounds  No wheezing  Abdominal:      General: Abdomen is flat  Bowel sounds are normal  There is no distension  Palpations: Abdomen is soft  Musculoskeletal:         General: No swelling  Skin:     General: Skin is warm and dry  Coloration: Skin is not jaundiced  Findings: No erythema  Neurological:      General: No focal deficit present  Mental Status: She is alert and oriented to person, place, and time  Mental status is at baseline  Psychiatric:         Mood and Affect: Mood normal          Behavior: Behavior normal          Pertinent Laboratory/Diagnostic Studies:    3/26/21  Creat 1 26    Current Medications   Medications reviewed and updated in facility chart  Mustarde Flap Text: The defect edges were debeveled with a #15 scalpel blade.  Given the size, depth and location of the defect and the proximity to free margins a Mustarde flap was deemed most appropriate. Using a sterile surgical marker, an appropriate flap was drawn incorporating the defect. The area thus outlined was incised with a #15 scalpel blade. The skin margins were undermined to an appropriate distance in all directions utilizing iris scissors. Following this, the designed flap was carried into the primary defect and sutured into place.